# Patient Record
Sex: MALE | Race: BLACK OR AFRICAN AMERICAN | Employment: OTHER | URBAN - NONMETROPOLITAN AREA
[De-identification: names, ages, dates, MRNs, and addresses within clinical notes are randomized per-mention and may not be internally consistent; named-entity substitution may affect disease eponyms.]

---

## 2020-11-23 ENCOUNTER — HOSPITAL ENCOUNTER (EMERGENCY)
Age: 65
Discharge: HOME | End: 2020-11-23
Attending: FAMILY MEDICINE

## 2020-11-23 VITALS
DIASTOLIC BLOOD PRESSURE: 62 MMHG | HEART RATE: 113 BPM | BODY MASS INDEX: 14.54 KG/M2 | RESPIRATION RATE: 16 BRPM | WEIGHT: 90.5 LBS | HEIGHT: 66 IN | SYSTOLIC BLOOD PRESSURE: 122 MMHG | OXYGEN SATURATION: 99 % | TEMPERATURE: 98.6 F

## 2020-11-23 DIAGNOSIS — L89.626 PRESSURE INJURY OF DEEP TISSUE OF LEFT HEEL: ICD-10-CM

## 2020-11-23 DIAGNOSIS — L89.896 PRESSURE INJURY OF DEEP TISSUE OF DORSUM OF LEFT FOOT: Primary | ICD-10-CM

## 2020-11-23 PROCEDURE — 99283 EMERGENCY DEPT VISIT LOW MDM: CPT

## 2020-11-23 RX ORDER — OXYCODONE AND ACETAMINOPHEN 5; 325 MG/1; MG/1
1 TABLET ORAL
Qty: 20 TAB | Refills: 0 | Status: SHIPPED | OUTPATIENT
Start: 2020-11-23 | End: 2020-11-28

## 2020-11-23 RX ORDER — CARVEDILOL 6.25 MG/1
6.25 TABLET ORAL 2 TIMES DAILY WITH MEALS
COMMUNITY
End: 2020-12-04 | Stop reason: SDUPTHER

## 2020-11-23 RX ORDER — LISINOPRIL 5 MG/1
5 TABLET ORAL DAILY
COMMUNITY
End: 2020-12-04 | Stop reason: SDUPTHER

## 2020-11-23 RX ORDER — CLOPIDOGREL BISULFATE 75 MG/1
75 TABLET ORAL
COMMUNITY
End: 2020-12-04 | Stop reason: SDUPTHER

## 2020-11-23 RX ORDER — HYDRALAZINE HYDROCHLORIDE 25 MG/1
25 TABLET, FILM COATED ORAL 3 TIMES DAILY
COMMUNITY
End: 2020-12-04 | Stop reason: SDUPTHER

## 2020-11-23 RX ORDER — PHENOL/SODIUM PHENOLATE
20 AEROSOL, SPRAY (ML) MUCOUS MEMBRANE DAILY
COMMUNITY
End: 2021-02-02

## 2020-11-23 RX ORDER — MIRTAZAPINE 15 MG/1
7.5 TABLET, FILM COATED ORAL
COMMUNITY
End: 2021-01-21

## 2020-11-23 RX ORDER — ATORVASTATIN CALCIUM 40 MG/1
40 TABLET, FILM COATED ORAL DAILY
COMMUNITY
End: 2020-12-04 | Stop reason: SDUPTHER

## 2020-11-23 NOTE — ED TRIAGE NOTES
Pt was discharged from [address] [place] [address] (for rehab after a heart attack) in [place] on Saturday, when pt got home family noticed that he had a DTI/wound on his left foot/heel and left lower leg. Pt was sent home with no pain medicine and his PCP is on vacation.   Pt also has staples in the right stump

## 2020-11-23 NOTE — ED PROVIDER NOTES
EMERGENCY DEPARTMENT HISTORY AND PHYSICAL EXAM      Date: 11/23/2020  Patient Name: Marcy Villa    History of Presenting Illness     Chief Complaint   Patient presents with    Foot Pain    Staple Removal       History Provided By: Patient    HPI: Marcy Villa, 77 y.o. male with past medical history of A-fib, HTN, MI, PVD presents to the ED with complaints of pain to the left foot. Pt lives alone in [place]. He had an MI with triple bypass two months ago. 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 Pt has had very little mobility since being d/c from rehab two days ago. Pt reported having pain in the left lower leg, most prominently in the left foot, to family, who noticed significant skin breakdown in the left shin and dorsum of the left foot. He denies any other sx or acute changes. There are no other complaints, changes, or physical findings at this time. PCP: Wilian Lovett MD    Current Outpatient Medications   Medication Sig Dispense Refill    carvediloL (COREG) 6.25 mg tablet Take 6.25 mg by mouth two (2) times daily (with meals).  hydrALAZINE (APRESOLINE) 25 mg tablet Take 25 mg by mouth three (3) times daily.  lisinopriL (PRINIVIL, ZESTRIL) 5 mg tablet Take 5 mg by mouth daily.  atorvastatin (LIPITOR) 40 mg tablet Take 40 mg by mouth daily.  mirtazapine (REMERON) 15 mg tablet Take 15 mg by mouth nightly.  Omeprazole delayed release (PRILOSEC D/R) 20 mg tablet Take 20 mg by mouth daily.  clopidogreL (Plavix) 75 mg tab Take 75 mg by mouth.          Past History     Past Medical History:  Past Medical History:   Diagnosis Date    Atrial fibrillation ([address]     Hypertension     MI (myocardial infarction) ([address]     [facility]        Past Surgical History:  Past Surgical History:   Procedure Laterality Date    HX ABOVE KNEE AMPUTATION Right     HX HEART CATHETERIZATION Family History:  History reviewed. No pertinent family history. Social History:  Social History     Tobacco Use    Smoking status: Former Smoker    Smokeless tobacco: Never Used   Substance Use Topics    Alcohol use: Not Currently    Drug use: Never       Allergies:  No Known Allergies      Review of Systems   Review of Systems   Constitutional: Negative for chills, fatigue and fever. HENT: Negative for congestion, rhinorrhea and sore throat. Eyes: Negative for pain, redness and visual disturbance. Respiratory: Negative for cough, shortness of breath and wheezing. Cardiovascular: Negative for chest pain and palpitations. Gastrointestinal: Negative for abdominal pain, diarrhea, nausea and vomiting. Musculoskeletal: Negative for arthralgias and myalgias. Positive for pain of the left foot. Skin: Positive for wound (left shin, left foot). Negative for color change and rash. Neurological: Negative for dizziness, weakness, light-headedness and headaches. Psychiatric/Behavioral: Negative for confusion. Physical Exam   Physical Exam  Vitals signs and nursing note reviewed. Constitutional:       General: He is awake. He is not in acute distress. Appearance: Normal appearance. He is well-developed and normal weight. He is not toxic-appearing or diaphoretic. Interventions: Face mask in place. Comments: Thin, elderly. HENT:      Head: Normocephalic and atraumatic. Eyes:      Extraocular Movements: Extraocular movements intact. Pupils: Pupils are equal, round, and reactive to light. Neck:      Musculoskeletal: Normal range of motion and neck supple. Cardiovascular:      Rate and Rhythm: Normal rate and regular rhythm. Pulses:           Posterior tibial pulses are detected w/ Doppler on the left side. Heart sounds: Normal heart sounds. Comments: Faint pulses detected with doppler in the left ankle.   Pulmonary:      Effort: Pulmonary effort is normal.      Breath sounds: Normal breath sounds. Abdominal:      General: Abdomen is flat. Palpations: Abdomen is soft. Tenderness: There is no abdominal tenderness. Feet:      Right foot:      Amputation: Right leg is amputated above knee. Left foot:      Skin integrity: Skin breakdown present. Skin:     General: Skin is warm and dry. Comments: Pressure sore to the left shin, that is approximately 4 x 2 cm(s) in size, with large amount of exudate and pink edges. No evidence of infection. Large pressure sore to the dorsum of the left foot, that is approximately 10 x 4 cm(s) in size, with large amount of exudate and pink edges. Tendons are visible. No evidence of infection. Large painless, necrotic area to the left heel. Neurological:      Mental Status: He is alert and oriented to person, place, and time. GCS: GCS eye subscore is 4. GCS verbal subscore is 5. GCS motor subscore is 6. Psychiatric:         Mood and Affect: Mood and affect normal.         Behavior: Behavior normal. Behavior is cooperative. Thought Content: Thought content normal.         Diagnostic Study Results     Labs -   No results found for this or any previous visit (from the past 12 hour(s)). Radiologic Studies -   No orders to display     CT Results  (Last 48 hours)    None        CXR Results  (Last 48 hours)    None          Medical Decision Making and ED Course   I am the first provider for this patient. I reviewed the vital signs, available nursing notes, past medical history, past surgical history, family history and social history. Vital Signs-Reviewed the patient's vital signs. Patient Vitals for the past 12 hrs:   Temp Pulse Resp BP SpO2   11/23/20 1649 -- -- -- -- 100 %   11/23/20 1616 98.6 °F (37 °C) (!) 110 18 118/63 100 %       Records Reviewed: Nursing Notes and Old Medical Records      ED Course:   Initial assessment performed.  The patients presenting problems have been discussed, and they are in agreement with the care plan formulated and outlined with them. I have encouraged them to ask questions as they arise throughout their visit. Disposition       Discharged    DISCHARGE PLAN:  1. Current Discharge Medication List      CONTINUE these medications which have NOT CHANGED    Details   carvediloL (COREG) 6.25 mg tablet Take 6.25 mg by mouth two (2) times daily (with meals). hydrALAZINE (APRESOLINE) 25 mg tablet Take 25 mg by mouth three (3) times daily. lisinopriL (PRINIVIL, ZESTRIL) 5 mg tablet Take 5 mg by mouth daily. atorvastatin (LIPITOR) 40 mg tablet Take 40 mg by mouth daily. mirtazapine (REMERON) 15 mg tablet Take 15 mg by mouth nightly. Omeprazole delayed release (PRILOSEC D/R) 20 mg tablet Take 20 mg by mouth daily. clopidogreL (Plavix) 75 mg tab Take 75 mg by mouth. 2.   Follow-up Information     Follow up With Specialties Details Why Contact Info    Nancy Valdez DO General Surgery, General Surgery In 1 week For wound re-check [id number] [address] [place]  [phone]          3. Return to ED if worse     Diagnosis     Clinical Impression:   1. Pressure injury of deep tissue of dorsum of left foot    2. Pressure injury of deep tissue of left heel        Attestations:    By signing my name below, I, Fabio Castanon, attest that this documentation has been prepared under the direction and in presence of Dr. Bradley Smart on 11/23/20. Electronically signed: Fbaio Castanon, 11/23/20, 4:57 PM      Please note that this dictation was completed with [employer], the [employer] voice recognition software. Quite often unanticipated grammatical, syntax, homophones, and other interpretive errors are inadvertently transcribed by the computer software. Please disregard these errors. Please excuse any errors that have escaped final proofreading. Thank you.

## 2020-11-30 ENCOUNTER — OFFICE VISIT (OUTPATIENT)
Dept: SURGERY | Age: 65
End: 2020-11-30
Payer: MEDICARE

## 2020-11-30 VITALS — TEMPERATURE: 98.4 F

## 2020-11-30 DIAGNOSIS — L97.521 FOOT ULCER, LEFT, LIMITED TO BREAKDOWN OF SKIN (HCC): Primary | ICD-10-CM

## 2020-11-30 PROCEDURE — 99203 OFFICE O/P NEW LOW 30 MIN: CPT | Performed by: SURGERY

## 2020-11-30 RX ORDER — LANOLIN ALCOHOL/MO/W.PET/CERES
CREAM (GRAM) TOPICAL
COMMUNITY
End: 2020-12-04 | Stop reason: SDUPTHER

## 2020-11-30 NOTE — PROGRESS NOTES
Rolo Mccarty presents today for   Chief Complaint   Patient presents with   [facility] ED Follow-up     Wound on Left Foot       Is someone accompanying this pt? Daughter    Is the patient using any DME equipment during [address]? Wheelchair    Depression Screening:  3 most recent PHQ Screens 11/23/2020   Little interest or pleasure in doing things Not at all   Feeling down, depressed, irritable, or hopeless Not at all   Total Score PHQ 2 0       Learning Assessment:  No flowsheet data found. Abuse Screening:  No flowsheet data found. Fall Risk  Fall Risk Assessment, last 12 mths 11/30/2020   Able to walk? No       Coordination of Care:  1. Have you been to the ER, urgent care clinic since your last visit? Hospitalized since your last visit? ED Follow Up    2. Have you seen or consulted any other health care providers outside of the [address] [place] [address] since your last visit? Include any pap smears or colon screening.  NA

## 2020-11-30 NOTE — LETTER
NOTIFICATION RETURN TO WORK / SCHOOL 
 
11/30/2020 1:12 PM 
 
 
 
 
To Whom It May Concern: 
 
Shahid Chung is daughter to George Xiong who  is currently under the care of [place]. She will return to work/school on: 12/1/2020 If there are questions or concerns please have the patient contact our office. Sincerely, Jt Hobson, DO
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
## 2020-12-04 ENCOUNTER — APPOINTMENT (OUTPATIENT)
Dept: GENERAL RADIOLOGY | Age: 65
DRG: 314 | End: 2020-12-04
Attending: FAMILY MEDICINE
Payer: MEDICARE

## 2020-12-04 ENCOUNTER — APPOINTMENT (OUTPATIENT)
Dept: GENERAL RADIOLOGY | Age: 65
DRG: 314 | End: 2020-12-04
Attending: PHYSICIAN ASSISTANT
Payer: MEDICARE

## 2020-12-04 ENCOUNTER — OFFICE VISIT (OUTPATIENT)
Dept: INTERNAL MEDICINE CLINIC | Age: 65
End: 2020-12-04
Payer: OTHER GOVERNMENT

## 2020-12-04 ENCOUNTER — HOSPITAL ENCOUNTER (INPATIENT)
Age: 65
LOS: 2 days | Discharge: HOME HEALTH | DRG: 314 | End: 2020-12-08
Attending: FAMILY MEDICINE | Admitting: INTERNAL MEDICINE
Payer: MEDICARE

## 2020-12-04 DIAGNOSIS — I95.9 HYPOTENSION, UNSPECIFIED HYPOTENSION TYPE: ICD-10-CM

## 2020-12-04 DIAGNOSIS — J30.89 SEASONAL ALLERGIC RHINITIS DUE TO FUNGAL SPORES: ICD-10-CM

## 2020-12-04 DIAGNOSIS — T07.XXXA WOUNDS, MULTIPLE: ICD-10-CM

## 2020-12-04 DIAGNOSIS — I10 BENIGN ESSENTIAL HTN: ICD-10-CM

## 2020-12-04 DIAGNOSIS — L08.9 WOUND INFECTION: Primary | ICD-10-CM

## 2020-12-04 DIAGNOSIS — Z13.1 DIABETES MELLITUS SCREENING: ICD-10-CM

## 2020-12-04 DIAGNOSIS — E78.2 MIXED HYPERLIPIDEMIA: ICD-10-CM

## 2020-12-04 DIAGNOSIS — D50.9 IRON DEFICIENCY ANEMIA, UNSPECIFIED IRON DEFICIENCY ANEMIA TYPE: ICD-10-CM

## 2020-12-04 DIAGNOSIS — T14.8XXA WOUND INFECTION: Primary | ICD-10-CM

## 2020-12-04 DIAGNOSIS — I25.10 CORONARY ARTERY DISEASE INVOLVING NATIVE HEART WITHOUT ANGINA PECTORIS, UNSPECIFIED VESSEL OR LESION TYPE: Primary | ICD-10-CM

## 2020-12-04 PROBLEM — S91.309A WOUND, OPEN, FOOT: Status: ACTIVE | Noted: 2020-12-04

## 2020-12-04 PROBLEM — A41.9 SEPSIS (HCC): Status: ACTIVE | Noted: 2020-12-04

## 2020-12-04 PROBLEM — Z95.1 HX OF CABG: Status: ACTIVE | Noted: 2020-12-04

## 2020-12-04 PROBLEM — R00.0 TACHYCARDIA: Status: ACTIVE | Noted: 2020-12-04

## 2020-12-04 PROBLEM — E87.20 LACTIC ACID INCREASED: Status: ACTIVE | Noted: 2020-12-04

## 2020-12-04 PROBLEM — F32.9 DEPRESSION, MAJOR: Status: ACTIVE | Noted: 2020-12-04

## 2020-12-04 PROBLEM — I48.20 CHRONIC ATRIAL FIBRILLATION (HCC): Status: ACTIVE | Noted: 2020-12-04

## 2020-12-04 LAB
ALBUMIN SERPL-MCNC: 2.9 G/DL (ref 3.5–4.7)
ALBUMIN/GLOB SERPL: 0.5 {RATIO}
ALP SERPL-CCNC: 99 U/L (ref 38–126)
ALT SERPL-CCNC: 9 U/L (ref 3–72)
ANION GAP SERPL CALC-SCNC: 9 MMOL/L
APPEARANCE UR: CLEAR
AST SERPL W P-5'-P-CCNC: 14 U/L (ref 17–74)
BACTERIA URNS QL MICRO: NEGATIVE /HPF
BASOPHILS # BLD: 0 K/UL (ref 0–0.1)
BASOPHILS NFR BLD: 0 % (ref 0–2)
BILIRUB SERPL-MCNC: 0.5 MG/DL (ref 0.2–1)
BILIRUB UR QL: NEGATIVE
BUN SERPL-MCNC: 19 MG/DL (ref 9–21)
BUN/CREAT SERPL: 19
CA-I BLD-MCNC: 9.7 MG/DL (ref 8.5–10.5)
CHLORIDE SERPL-SCNC: 104 MMOL/L (ref 94–111)
CO2 SERPL-SCNC: 28 MMOL/L (ref 21–33)
COLOR UR: ABNORMAL
CREAT SERPL-MCNC: 1 MG/DL (ref 0.8–1.5)
EOSINOPHIL # BLD: 0.1 K/UL (ref 0–0.4)
EOSINOPHIL NFR BLD: 0 % (ref 0–5)
EPITH CASTS URNS QL MICRO: ABNORMAL /LPF (ref 0–20)
ERYTHROCYTE [DISTWIDTH] IN BLOOD BY AUTOMATED COUNT: 17.2 % (ref 11.6–14.5)
GLOBULIN SER CALC-MCNC: 5.8 G/DL
GLUCOSE SERPL-MCNC: 142 MG/DL (ref 70–110)
GLUCOSE UR STRIP.AUTO-MCNC: NEGATIVE MG/DL
GRAN CASTS URNS QL MICRO: ABNORMAL /LPF
HCT VFR BLD AUTO: 29 % (ref 36–48)
HGB BLD-MCNC: 8.6 G/DL (ref 13–16)
HGB UR QL STRIP: NEGATIVE
HYALINE CASTS URNS QL MICRO: ABNORMAL /LPF
IMM GRANULOCYTES # BLD AUTO: 0.1 K/UL
IMM GRANULOCYTES NFR BLD AUTO: 0 %
KETONES UR QL STRIP.AUTO: NEGATIVE MG/DL
LACTATE SERPL-SCNC: 2.2 MMOL/L (ref 0.5–2)
LACTATE SERPL-SCNC: 2.6 MMOL/L (ref 0.5–2)
LEUKOCYTE ESTERASE UR QL STRIP.AUTO: NEGATIVE
LYMPHOCYTES # BLD: 1.9 K/UL (ref 0.9–3.6)
LYMPHOCYTES NFR BLD: 16 % (ref 21–52)
MCH RBC QN AUTO: 28.4 PG (ref 24–34)
MCHC RBC AUTO-ENTMCNC: 29.7 G/DL (ref 31–37)
MCV RBC AUTO: 95.7 FL (ref 74–97)
MONOCYTES # BLD: 0.8 K/UL (ref 0.05–1.2)
MONOCYTES NFR BLD: 7 % (ref 3–10)
MUCOUS THREADS URNS QL MICRO: ABNORMAL /LPF
NEUTS SEG # BLD: 8.8 K/UL (ref 1.8–8)
NEUTS SEG NFR BLD: 77 % (ref 40–73)
NITRITE UR QL STRIP.AUTO: NEGATIVE
PH UR STRIP: 7 [PH] (ref 5–9)
PLATELET # BLD AUTO: 702 K/UL (ref 135–420)
PMV BLD AUTO: 8.9 FL (ref 9.2–11.8)
POTASSIUM SERPL-SCNC: 3.8 MMOL/L (ref 3.2–5.1)
PROT SERPL-MCNC: 8.7 G/DL (ref 6.1–8.4)
PROT UR STRIP-MCNC: 30 MG/DL
RBC # BLD AUTO: 3.03 M/UL (ref 4.7–5.5)
RBC #/AREA URNS HPF: ABNORMAL /HPF (ref 0–2)
SODIUM SERPL-SCNC: 141 MMOL/L (ref 135–145)
SP GR UR REFRACTOMETRY: 1.01 (ref 1–1.03)
TROPONIN I SERPL-MCNC: 0.02 NG/ML (ref 0.02–0.05)
UROBILINOGEN UR QL STRIP.AUTO: 0.2 EU/DL (ref 0.2–1)
WBC # BLD AUTO: 11.6 K/UL (ref 4.6–13.2)
WBC URNS QL MICRO: ABNORMAL /HPF (ref 0–4)

## 2020-12-04 PROCEDURE — 99218 HC RM OBSERVATION: CPT

## 2020-12-04 PROCEDURE — 74011000258 HC RX REV CODE- 258: Performed by: INTERNAL MEDICINE

## 2020-12-04 PROCEDURE — 96361 HYDRATE IV INFUSION ADD-ON: CPT

## 2020-12-04 PROCEDURE — 80053 COMPREHEN METABOLIC PANEL: CPT

## 2020-12-04 PROCEDURE — 74011250636 HC RX REV CODE- 250/636: Performed by: INTERNAL MEDICINE

## 2020-12-04 PROCEDURE — 99285 EMERGENCY DEPT VISIT HI MDM: CPT

## 2020-12-04 PROCEDURE — 81001 URINALYSIS AUTO W/SCOPE: CPT

## 2020-12-04 PROCEDURE — 99205 OFFICE O/P NEW HI 60 MIN: CPT | Performed by: INTERNAL MEDICINE

## 2020-12-04 PROCEDURE — 74011250636 HC RX REV CODE- 250/636: Performed by: PHYSICIAN ASSISTANT

## 2020-12-04 PROCEDURE — 74011000258 HC RX REV CODE- 258: Performed by: PHYSICIAN ASSISTANT

## 2020-12-04 PROCEDURE — 96365 THER/PROPH/DIAG IV INF INIT: CPT

## 2020-12-04 PROCEDURE — 85025 COMPLETE CBC W/AUTO DIFF WBC: CPT

## 2020-12-04 PROCEDURE — 84484 ASSAY OF TROPONIN QUANT: CPT

## 2020-12-04 PROCEDURE — 87040 BLOOD CULTURE FOR BACTERIA: CPT

## 2020-12-04 PROCEDURE — 83605 ASSAY OF LACTIC ACID: CPT

## 2020-12-04 PROCEDURE — 74011250636 HC RX REV CODE- 250/636: Performed by: FAMILY MEDICINE

## 2020-12-04 PROCEDURE — 71045 X-RAY EXAM CHEST 1 VIEW: CPT

## 2020-12-04 PROCEDURE — 96375 TX/PRO/DX INJ NEW DRUG ADDON: CPT

## 2020-12-04 PROCEDURE — 73620 X-RAY EXAM OF FOOT: CPT

## 2020-12-04 PROCEDURE — 93005 ELECTROCARDIOGRAM TRACING: CPT

## 2020-12-04 RX ORDER — FACIAL-BODY WIPES
10 EACH TOPICAL DAILY PRN
Status: DISCONTINUED | OUTPATIENT
Start: 2020-12-04 | End: 2020-12-08 | Stop reason: HOSPADM

## 2020-12-04 RX ORDER — CLOPIDOGREL BISULFATE 75 MG/1
75 TABLET ORAL DAILY
Status: DISCONTINUED | OUTPATIENT
Start: 2020-12-05 | End: 2020-12-08 | Stop reason: HOSPADM

## 2020-12-04 RX ORDER — LISINOPRIL 5 MG/1
5 TABLET ORAL DAILY
Qty: 90 TAB | Refills: 1 | Status: SHIPPED | OUTPATIENT
Start: 2020-12-04

## 2020-12-04 RX ORDER — CARVEDILOL 6.25 MG/1
6.25 TABLET ORAL 2 TIMES DAILY WITH MEALS
Qty: 180 TAB | Refills: 1 | Status: SHIPPED | OUTPATIENT
Start: 2020-12-04

## 2020-12-04 RX ORDER — ENOXAPARIN SODIUM 100 MG/ML
40 INJECTION SUBCUTANEOUS DAILY
Status: DISCONTINUED | OUTPATIENT
Start: 2020-12-05 | End: 2020-12-07

## 2020-12-04 RX ORDER — SODIUM CHLORIDE 9 MG/ML
75 INJECTION, SOLUTION INTRAVENOUS CONTINUOUS
Status: DISCONTINUED | OUTPATIENT
Start: 2020-12-04 | End: 2020-12-05

## 2020-12-04 RX ORDER — ASPIRIN 81 MG/1
81 TABLET ORAL DAILY
Status: DISCONTINUED | OUTPATIENT
Start: 2020-12-05 | End: 2020-12-08 | Stop reason: HOSPADM

## 2020-12-04 RX ORDER — ASPIRIN 81 MG/1
81 TABLET ORAL DAILY
Qty: 90 TAB | Refills: 1 | Status: SHIPPED | OUTPATIENT
Start: 2020-12-04 | End: 2021-02-02

## 2020-12-04 RX ORDER — HYDRALAZINE HYDROCHLORIDE 25 MG/1
25 TABLET, FILM COATED ORAL 3 TIMES DAILY
Qty: 270 TAB | Refills: 1 | Status: SHIPPED | OUTPATIENT
Start: 2020-12-04

## 2020-12-04 RX ORDER — HYDRALAZINE HYDROCHLORIDE 25 MG/1
25 TABLET, FILM COATED ORAL 3 TIMES DAILY
Status: DISCONTINUED | OUTPATIENT
Start: 2020-12-05 | End: 2020-12-04

## 2020-12-04 RX ORDER — SODIUM CHLORIDE 0.9 % (FLUSH) 0.9 %
5-40 SYRINGE (ML) INJECTION EVERY 8 HOURS
Status: DISCONTINUED | OUTPATIENT
Start: 2020-12-04 | End: 2020-12-06

## 2020-12-04 RX ORDER — LORATADINE 10 MG/1
10 TABLET ORAL
COMMUNITY
End: 2020-12-04 | Stop reason: SDUPTHER

## 2020-12-04 RX ORDER — SENNOSIDES 8.6 MG/1
1 TABLET ORAL DAILY
COMMUNITY

## 2020-12-04 RX ORDER — ASPIRIN 81 MG/1
81 TABLET ORAL DAILY
COMMUNITY
End: 2020-12-04 | Stop reason: SDUPTHER

## 2020-12-04 RX ORDER — CLOPIDOGREL BISULFATE 75 MG/1
75 TABLET ORAL DAILY
Qty: 90 TAB | Refills: 2 | Status: SHIPPED | OUTPATIENT
Start: 2020-12-04 | End: 2021-01-20

## 2020-12-04 RX ORDER — SODIUM CHLORIDE 0.9 % (FLUSH) 0.9 %
5-40 SYRINGE (ML) INJECTION AS NEEDED
Status: DISCONTINUED | OUTPATIENT
Start: 2020-12-04 | End: 2020-12-08 | Stop reason: HOSPADM

## 2020-12-04 RX ORDER — LANOLIN ALCOHOL/MO/W.PET/CERES
325 CREAM (GRAM) TOPICAL
Status: DISCONTINUED | OUTPATIENT
Start: 2020-12-05 | End: 2020-12-08 | Stop reason: HOSPADM

## 2020-12-04 RX ORDER — PROMETHAZINE HYDROCHLORIDE 25 MG/1
12.5 TABLET ORAL
Status: DISCONTINUED | OUTPATIENT
Start: 2020-12-04 | End: 2020-12-08 | Stop reason: HOSPADM

## 2020-12-04 RX ORDER — ATORVASTATIN CALCIUM 40 MG/1
40 TABLET, FILM COATED ORAL DAILY
Status: DISCONTINUED | OUTPATIENT
Start: 2020-12-05 | End: 2020-12-05

## 2020-12-04 RX ORDER — ATORVASTATIN CALCIUM 40 MG/1
40 TABLET, FILM COATED ORAL DAILY
Qty: 90 TAB | Refills: 1 | Status: SHIPPED | OUTPATIENT
Start: 2020-12-04

## 2020-12-04 RX ORDER — ACETAMINOPHEN 325 MG/1
650 TABLET ORAL
Status: DISCONTINUED | OUTPATIENT
Start: 2020-12-04 | End: 2020-12-08 | Stop reason: HOSPADM

## 2020-12-04 RX ORDER — MIRTAZAPINE 15 MG/1
7.5 TABLET, FILM COATED ORAL
Status: DISCONTINUED | OUTPATIENT
Start: 2020-12-05 | End: 2020-12-08 | Stop reason: HOSPADM

## 2020-12-04 RX ORDER — ONDANSETRON 2 MG/ML
4 INJECTION INTRAMUSCULAR; INTRAVENOUS
Status: DISCONTINUED | OUTPATIENT
Start: 2020-12-04 | End: 2020-12-08 | Stop reason: HOSPADM

## 2020-12-04 RX ORDER — LORATADINE 10 MG/1
10 TABLET ORAL DAILY
Status: DISCONTINUED | OUTPATIENT
Start: 2020-12-05 | End: 2020-12-05

## 2020-12-04 RX ORDER — CARVEDILOL 6.25 MG/1
6.25 TABLET ORAL 2 TIMES DAILY WITH MEALS
Status: DISCONTINUED | OUTPATIENT
Start: 2020-12-05 | End: 2020-12-05 | Stop reason: SDUPTHER

## 2020-12-04 RX ORDER — LANOLIN ALCOHOL/MO/W.PET/CERES
325 CREAM (GRAM) TOPICAL
Qty: 90 TAB | Refills: 1 | Status: SHIPPED | OUTPATIENT
Start: 2020-12-04

## 2020-12-04 RX ORDER — LORATADINE 10 MG/1
10 TABLET ORAL DAILY
Qty: 90 TAB | Refills: 3 | Status: SHIPPED | OUTPATIENT
Start: 2020-12-04

## 2020-12-04 RX ORDER — ACETAMINOPHEN 650 MG/1
650 SUPPOSITORY RECTAL
Status: DISCONTINUED | OUTPATIENT
Start: 2020-12-04 | End: 2020-12-08 | Stop reason: HOSPADM

## 2020-12-04 RX ADMIN — SODIUM CHLORIDE 1000 ML: 9 INJECTION, SOLUTION INTRAVENOUS at 19:05

## 2020-12-04 RX ADMIN — SODIUM CHLORIDE 75 ML/HR: 9 INJECTION, SOLUTION INTRAVENOUS at 23:28

## 2020-12-04 RX ADMIN — VANCOMYCIN HYDROCHLORIDE 1000 MG: 1 INJECTION, POWDER, FOR SOLUTION INTRAVENOUS at 21:59

## 2020-12-04 RX ADMIN — PIPERACILLIN AND TAZOBACTAM 3.38 G: 3; .375 INJECTION, POWDER, LYOPHILIZED, FOR SOLUTION INTRAVENOUS at 23:27

## 2020-12-04 RX ADMIN — CEFEPIME 2 G: 2 INJECTION, POWDER, FOR SOLUTION INTRAVENOUS at 21:22

## 2020-12-04 RX ADMIN — SODIUM CHLORIDE 1000 ML: 9 INJECTION, SOLUTION INTRAVENOUS at 17:36

## 2020-12-04 NOTE — ED PROVIDER NOTES
EMERGENCY DEPARTMENT HISTORY AND PHYSICAL EXAM      Date: 12/4/2020  Patient Name: Rasheeda Yang    History of Presenting Illness     Chief Complaint   Patient presents with    Hypotension       History Provided By: Patient's Daughter    HPI: Rasheeda Yang, 77 y.o. male with past medical history significant of A-fib, DM, HTN, HLD, MI, PVD presents to the ED with complaints of hypotension. Pt was noted to have an elevated heart rate (130) and low blood pressure (80/50) when leaving an appointment with his PCP this evening, and was advised to come here for evaluation. Pt reports having indigestion, which his daughter states has resulted in similar vitals before, but denies fever, cough, chest pain or any other sx. Pt was evaluated here several weeks ago for left foot pain secondary to ulcers, which he has seen Dr. Yonathan Velazco (general surgery) for. Pt will need a debridement of left anterior foot wound, which is to be schedule after a vascular study is completed. There are no other complaints, changes, or physical findings at this time. PCP: Nga Bowles MD    No current facility-administered medications on file prior to encounter. Current Outpatient Medications on File Prior to Encounter   Medication Sig Dispense Refill    senna (Senna) 8.6 mg tablet Take 1 Tab by mouth daily.  loratadine (CLARITIN) 10 mg tablet Take 1 Tab by mouth daily. 90 Tab 3    hydrALAZINE (APRESOLINE) 25 mg tablet Take 1 Tab by mouth three (3) times daily. 270 Tab 1    atorvastatin (LIPITOR) 40 mg tablet Take 1 Tab by mouth daily. 90 Tab 1    lisinopriL (PRINIVIL, ZESTRIL) 5 mg tablet Take 1 Tab by mouth daily. 90 Tab 1    carvediloL (COREG) 6.25 mg tablet Take 1 Tab by mouth two (2) times daily (with meals). 180 Tab 1    clopidogreL (Plavix) 75 mg tab Take 1 Tab by mouth daily. 90 Tab 2    aspirin delayed-release 81 mg tablet Take 1 Tab by mouth daily.  90 Tab 1    ferrous sulfate 325 mg (65 mg iron) tablet Take 1 Tab by mouth Daily (before breakfast). 90 Tab 1    mirtazapine (REMERON) 15 mg tablet Take 7.5 mg by mouth nightly.  Omeprazole delayed release (PRILOSEC D/R) 20 mg tablet Take 20 mg by mouth daily. Past History     Past Medical History:  Past Medical History:   Diagnosis Date    [facility]     Hypertension     [facility]     PVD [facility]peripheral vascular disease) [facility]        Past Surgical History:  Past Surgical History:   Procedure Laterality Date    HX ABOVE KNEE AMPUTATION Right     HX ABOVE KNEE AMPUTATION Right     HX HEART CATHETERIZATION         Family History:  Family History   Problem Relation Age of Onset    Cancer Mother         Ovarian, Cervical    Stroke Father     Cancer Brother        Social History:  Social History     Tobacco Use    Smoking status: Former Smoker    Smokeless tobacco: Never Used   Substance Use Topics    Alcohol use: Not Currently    Drug use: Never       Allergies:  No Known Allergies      Review of Systems   Review of Systems   Constitutional: Negative for chills, fatigue and fever. Respiratory: Negative for cough, shortness of breath and wheezing. Cardiovascular: Negative for chest pain and palpitations. Gastrointestinal: Negative for abdominal pain, diarrhea, nausea and vomiting. Musculoskeletal:        Negative for acute changes. Skin: Negative for color change and rash. Negative for acute changes. Neurological: Negative for dizziness, weakness, light-headedness and headaches. Physical Exam   Physical Exam  Vitals signs and nursing note reviewed. Constitutional:       General: He is awake. He is not in acute distress. Appearance: Normal appearance. He is well-developed and normal weight. He is not ill-appearing, toxic-appearing or diaphoretic. Interventions: Face mask in place. Comments: Thin, elderly. HENT:      Head: Normocephalic and atraumatic.    Eyes:      Extraocular Movements: Extraocular movements intact. Pupils: Pupils are equal, round, and reactive to light. Neck:      Musculoskeletal: Normal range of motion and neck supple. Cardiovascular:      Rate and Rhythm: Regular rhythm. Tachycardia present. Heart sounds: Normal heart sounds. Pulmonary:      Effort: Pulmonary effort is normal.      Breath sounds: Normal breath sounds. Abdominal:      General: Abdomen is flat. Palpations: Abdomen is soft. Tenderness: There is no abdominal tenderness. Feet:      Right foot:      Amputation: Right leg is amputated above knee. Left foot:      Skin integrity: Ulcer and skin breakdown present. Skin:     General: Skin is warm and dry. Comments: Small open wound along distal aspect of sternotomy scar. Moderate-sized pressure sore to the left shin, that is well-healing. Large pressure sore to the dorsum of the left foot with small amount of exudate. No evidence of infection. Large painless, necrotic area to the left heel. Neurological:      Mental Status: He is alert and oriented to person, place, and time. GCS: GCS eye subscore is 4. GCS verbal subscore is 5. GCS motor subscore is 6. Psychiatric:         Mood and Affect: Mood and affect normal.         Behavior: Behavior normal. Behavior is cooperative. Thought Content:  Thought content normal.         Diagnostic Study Results     Labs -     Recent Results (from the past 12 hour(s))   CBC WITH AUTOMATED DIFF    Collection Time: 12/04/20  5:30 PM   Result Value Ref Range    WBC 11.6 4.6 - 13.2 K/uL    RBC 3.03 (L) 4.70 - 5.50 M/uL    HGB 8.6 (L) 13.0 - 16.0 g/dL    HCT 29.0 (L) 36.0 - 48.0 %    MCV 95.7 74.0 - 97.0 FL    MCH 28.4 24.0 - 34.0 PG    MCHC 29.7 (L) 31.0 - 37.0 g/dL    RDW 17.2 (H) 11.6 - 14.5 %    PLATELET 909 (H) 274 - 420 K/uL    MPV 8.9 (L) 9.2 - 11.8 FL    NEUTROPHILS 77 (H) 40 - 73 %    LYMPHOCYTES 16 (L) 21 - 52 %    MONOCYTES 7 3 - 10 %    EOSINOPHILS 0 0 - 5 % BASOPHILS 0 0 - 2 %    IMMATURE GRANULOCYTES 0 %    ABS. NEUTROPHILS 8.8 (H) 1.8 - 8.0 K/UL    ABS. LYMPHOCYTES 1.9 0.9 - 3.6 K/UL    ABS. MONOCYTES 0.8 0.05 - 1.2 K/UL    ABS. EOSINOPHILS 0.1 0.0 - 0.4 K/UL    ABS. BASOPHILS 0.0 0.0 - 0.1 K/UL    ABS. IMM. GRANS. 0.1 K/UL   METABOLIC PANEL, COMPREHENSIVE    Collection Time: 12/04/20  5:30 PM   Result Value Ref Range    Sodium 141 135 - 145 mmol/L    Potassium 3.8 3.2 - 5.1 mmol/L    Chloride 104 94 - 111 mmol/L    CO2 28 21 - 33 mmol/L    Anion gap 9 mmol/L    Glucose 142 (H) 70 - 110 mg/dL    BUN 19 9 - 21 mg/dL    Creatinine 1.00 0.8 - 1.50 mg/dL    BUN/Creatinine ratio 19      GFR est AA >60 ml/min/1.73m2    GFR est non-AA >60 ml/min/1.73m2    Calcium 9.7 8.5 - 10.5 mg/dL    Bilirubin, total 0.5 0.2 - 1.0 mg/dL    AST (SGOT) 14 (L) 17 - 74 U/L    ALT (SGPT) 9 3 - 72 U/L    Alk.  phosphatase 99 38 - 126 U/L    Protein, total 8.7 (H) 6.1 - 8.4 g/dL    Albumin 2.9 (L) 3.5 - 4.7 g/dL    Globulin 5.8 g/dL    A-G Ratio 0.5     TROPONIN I    Collection Time: 12/04/20  5:30 PM   Result Value Ref Range    Troponin-I, Qt. 0.02 0.02 - 0.05 ng/mL   LACTIC ACID    Collection Time: 12/04/20  5:30 PM   Result Value Ref Range    Lactic acid 2.6 (HH) 0.5 - 2.0 mmol/L   EKG, 12 LEAD, INITIAL    Collection Time: 12/04/20  5:37 PM   Result Value Ref Range    Ventricular Rate 118 BPM    Atrial Rate 118 BPM    P-R Interval 122 ms    QRS Duration 87 ms    Q-T Interval 337 ms    QTC Calculation (Bezet) 473 ms    Calculated P Axis 84 degrees    Calculated R Axis 85 degrees    Calculated T Axis -89 degrees    Diagnosis       Sinus tachycardia  Probable left atrial enlargement  Borderline right axis deviation  Probable left ventricular hypertrophy  Abnormal T, consider ischemia, diffuse leads     LACTIC ACID    Collection Time: 12/04/20  7:00 PM   Result Value Ref Range    Lactic acid 2.2 (HH) 0.5 - 2.0 mmol/L   URINALYSIS W/ RFLX MICROSCOPIC    Collection Time: 12/04/20  8:27 PM Result Value Ref Range    Color Yellow/Straw      Appearance Clear Clear      Specific gravity 1.015 1.003 - 1.035      pH (UA) 7.0 5.0 - 9.0      Protein 30 (A) Negative mg/dL    Glucose Negative Negative mg/dL    Ketone Negative Negative mg/dL    Bilirubin Negative Negative      Blood Negative Negative      Urobilinogen 0.2 0.2 - 1.0 EU/dL    Nitrites Negative Negative      Leukocyte Esterase Negative Negative     URINE MICROSCOPIC    Collection Time: 12/04/20  8:27 PM   Result Value Ref Range    WBC 0-4 0 - 4 /hpf    RBC 0-5 0 - 2 /hpf    Epithelial cells Few 0 - 20 /lpf    Bacteria Negative (A) None /hpf    Mucus 3+ /lpf    Hyaline cast 2-5 /lpf    Granular cast 2-5 /lpf       Radiologic Studies -   XR CHEST PORT    (Results Pending)   XR FOOT LT AP/LAT    (Results Pending)     CT Results  (Last 48 hours)    None        CXR Results  (Last 48 hours)    None            Medical Decision Making   I am the first provider for this patient. I reviewed the vital signs, available nursing notes, past medical history, past surgical history, family history and social history. Vital Signs-Reviewed the patient's vital signs. Patient Vitals for the past 12 hrs:   Temp Pulse Resp BP SpO2   12/04/20 2257 99.4 °F (37.4 °C) (!) 106 24 (!) 161/86 100 %   12/04/20 2256 -- (!) 104 16 (!) 150/81 99 %   12/04/20 2010 -- -- 16 (!) 145/72 100 %   12/04/20 1830 -- 91 17 137/70 99 %   12/04/20 1745 -- (!) 122 17 (!) 145/80 98 %   12/04/20 1718 98 °F (36.7 °C) (!) 119 16 (!) 90/58 100 %     EKG interpretation (Preliminary): Performed at 5:37 PM; Read at 5:37 PM.  Rhythm: normal sinus rhythm and sinus tachycardia; Rate (approx.) 118; Axis: right axis deviation; MI interval: normal; QRS interval: normal ; ST/T wave: non-specific changes; Other findings: left atrial enlargement, left ventricular hypertrophy; AFib. On plavix. .    Records Reviewed: Nursing Notes and Old Medical Records    ED Course:   Initial assessment performed.  The patients presenting problems have been discussed, and they are in agreement with the care plan formulated and outlined with them. I have encouraged them to ask questions as they arise throughout their visit. Provider Notes (Medical Decision Making):   8:26 PM  Signed out by Dr Missy Castellanos; 76 yo bm sent from Dr Shine Resides office for hypotension and tachycardia. Notes state bp: 83/58; P: 130. Pt is asymptomatic; has feeding tube in place; alert ox 3; right aka; wounds right leg. PMHx: CABGx3 11/'20 [place] with iatrogenic sepsis of RLE requiring right AKA; left shin and left heel PVD ulcers followed by Dr Maria Eugenia Palmer; HLD; [facility] elevated lactate; will admit for observation. Procedures:  7:50 PM. HIMAP ultrasound performed. Good cardiac contractility. No pericardial effusion. No enlargement or plethora of the IVC. No fluid in Morison's pouch. No evidence of an aortic aneurism. No pulmonary findings. Consultations:     Consultations:     Disposition     Disposition: Admit        PLAN:  1. Current Discharge Medication List        2. Follow-up Information    None       Return to ED if worse     Diagnosis     Clinical Impression:   1. Wound infection    2. Hypotension, unspecified hypotension type        By signing my name below, Genna Gonzalez, attsuzette that this documentation has been prepared under the direction and in presence of Dr. Molly Dutton on 12/05/20.  Electronically signed: Eileen Gloria, 12/05/20, 5:23 PM

## 2020-12-04 NOTE — ED TRIAGE NOTES
Pt's caregiver reports they were at the MD office and leaving when they were instructed to go the ED regarding the pt having an elevated HR and low BP. Pt denies feeling bad, states he has gas but nothing else.

## 2020-12-04 NOTE — PROGRESS NOTES
ADDENDUM  -Vital signs were obtained and showed blood pressure 83/58 with a pulse of 130. Oxygen saturation that was 100% and the patient had no symptoms. I am very concerned though given that this man's blood pressure is so low and he is so tachycardic and I have now just ordered all of these antihypertensive meds. He did take most of his meds this morning but he is actually due for doses tonight. Again this is my first time seeing this gentleman and he has a very complicated history which I have no access to because I do not have his medical records. At this point when to refer him to emergency room for emergency labs and evaluation  Total time spent was greater than 60 minutes more than 50% spent in coordination of care and counselling. 25 minutes was spent discussing case with family. 10 minutes was spent in assessing patient and 30 minutes 1. Coronary artery disease involving native heart without angina pectoris, unspecified vessel or lesion type  Will emergently restart Plavix and aspirin which she has run out today. I am in the process of getting his old records from cardiovascular. Unfortunately he is new to the area  - clopidogreL (Plavix) 75 mg tab; Take 1 Tab by mouth daily. Dispense: 90 Tab; Refill: 2  - aspirin delayed-release 81 mg tablet; Take 1 Tab by mouth daily. Dispense: 90 Tab; Refill: 1    2. Mixed hyperlipidemia  Baseline lipid panel unknown continue statin which I have refilled form and will check a lipid profile  - atorvastatin (LIPITOR) 40 mg tablet; Take 1 Tab by mouth daily. Dispense: 90 Tab; Refill: 1  - LIPID PANEL; Future    3. Benign essential HTN  I am calling in emergency orders for his ACE inhibitor his Coreg and his hydralazine for blood pressure control and checking his CMP  - hydrALAZINE (APRESOLINE) 25 mg tablet; Take 1 Tab by mouth three (3) times daily. Dispense: 270 Tab; Refill: 1  - lisinopriL (PRINIVIL, ZESTRIL) 5 mg tablet; Take 1 Tab by mouth daily.   Dispense: 90 Tab; Refill: 1  - carvediloL (COREG) 6.25 mg tablet; Take 1 Tab by mouth two (2) times daily (with meals). Dispense: 180 Tab; Refill: 1  - METABOLIC PANEL, COMPREHENSIVE; Future    4. Seasonal allergic rhinitis due to fungal spores  Continue Claritin  - loratadine (CLARITIN) 10 mg tablet; Take 1 Tab by mouth daily. Dispense: 90 Tab; Refill: 3    5. Iron deficiency anemia, unspecified iron deficiency anemia type  Given the amount of fatigue is been having him definitely renewing his iron sulfate which he has not been taking. I am checking an iron profile and a CBC  - ferrous sulfate 325 mg (65 mg iron) tablet; Take 1 Tab by mouth Daily (before breakfast). Dispense: 90 Tab; Refill: 1  - IRON PROFILE  - CBC WITH AUTOMATED DIFF; Future    6. Diabetes mellitus screening  Given his lower extremity wounds I am checking a hemoglobin A1c to see what his blood sugars have been  - HEMOGLOBIN A1C WITH EAG; Future    7. Wounds, multiple  These are currently being followed by surgery      Chief Complaint   Patient presents with    Establish Care        HPI   This is a very unfortunate 64-year-old gentleman who reportedly have not had a massive heart attack in an outside facility which required an emergency coronary artery bypass. Has acquired a consequence of that bypass he lost his right leg and now has chronic ulcers on his left leg currently being followed by surgery. He also has what appears to be a residual sternotomy wound that is draining a small amount of serous fluid. He reports that he is quite fatigued as well. He denies any current chest pain palpitations or shortness of breath but is unable to walk much because he is missing a leg and is wheelchair-bound. The patient is about to run has run out of all of his medications as of today. I do not have any of his outpatient records. Per the patient's daughter who is at the bedside he has been eating well but she has been having to force some shakes on him. He has not had any nausea or vomiting and he has been moving his bowels. It is difficult to get any further review of systems from this patient because of his underlying mental state. Current Outpatient Medications on File Prior to Visit   Medication Sig Dispense Refill    senna (Senna) 8.6 mg tablet Take 1 Tab by mouth daily.  mirtazapine (REMERON) 15 mg tablet Take 7.5 mg by mouth nightly.  Omeprazole delayed release (PRILOSEC D/R) 20 mg tablet Take 20 mg by mouth daily. No current facility-administered medications on file prior to visit. ROS  - GEN: + weight loss, no fevers or chills  - HEENT: no vision changes, no tinnitus, no sore throat  - CV: no cp, palpitations or edema  - RESP: no sob, cough  - ABD: no n/v/d, no blood in stool  - : no dysuria or changes in freq. - SKIN: chronic LLE wound, sternotomy wound noted  - Neuro: no resting tremors, parasthesia in extremities, no headaches  - MS: + generalized weakness  - Psych: negative for depression or anxiety       Physical Exam  Constitutional:       Appearance:cachectic. under weight. NAD and pleasant hard of hearing  HENT:      Head: Normocephalic. Nose: Nose normal.      Mouth/Throat:      Mouth: Mucous membranes are moist. Throat not inflammed no teeth  Eyes:      Extraocular Movements: Extraocular movements intact. Conjunctiva/sclera: Conjunctivae normal. Sclera anicteric     Pupils: Pupils are equal, round, and reactive to light. Cardiovascular:      Rate and Rhythm: tachycardic 1/6 sm lsb     Pulses: Normal pulses. Pulmonary:      Effort: No respiratory distress. Breath sounds: CTAB and No stridor. No rhonchi. Abdominal:      General: There is no distension. NT, ND  Neurological:      Mental Status: patient is alert and oriented times 3.  No resting tremor, normal gait     Cranial Nerves: cranial nerves grossly intact  Muskuloskeletal     Full ROM in extremities     Normal gait  Skin    Stage 4 open wound LLE. Sternotomy wound noted draining serous drainage.  No warmth or redness       Deferred  Psychiatry     Calm, normal affect, interacting normally

## 2020-12-04 NOTE — PROGRESS NOTES
Establish care. Sees Dr Gilles Prieto for left foot wound. Left foot wound, wants nicotiene patches. Carlos Love presents today for   Chief Complaint   Patient presents with    Establish Care       Depression Screening:  3 most recent PHQ Screens 12/4/2020   Little interest or pleasure in doing things Not at all   Feeling down, depressed, irritable, or hopeless Not at all   Total Score PHQ 2 0       Learning Assessment:  No flowsheet data found. Fall Risk  Fall Risk Assessment, last 12 mths 12/4/2020   Able to walk? Yes   Fall in past 12 months? No       ADL  ADL Assessment 12/4/2020   Feeding yourself No Help Needed   Getting from bed to chair Help Needed   Getting dressed Help Needed   Bathing or showering Help Needed   Walk across the room (includes cane/walker) Help Needed   Using the telphone No Help Needed   Taking your medications No Help Needed   Preparing meals Help Needed   Managing money (expenses/bills) Help Needed   Moderately strenuous housework (laundry) Help Needed   Shopping for personal items (toiletries/medicines) Help Needed   Shopping for groceries Help Needed   Driving Help Needed   Climbing a flight of stairs Help Needed   Getting to places beyond walking distances Help Needed       Health Maintenance reviewed and discussed and ordered per Provider. Health Maintenance Due   Topic Date Due    Hepatitis C Screening  1955    Lipid Screen  08/24/1963    DTaP/Tdap/Td series (1 - Tdap) 08/24/1974    Shingrix Vaccine Age 50> (1 of 2) 08/24/2003    Colorectal Cancer Screening Combo  08/24/2003    GLAUCOMA SCREENING Q2Y  08/24/2018    Pneumococcal 65+ years (1 of 1 - PPSV23) 08/24/2018    Flu Vaccine (1) 09/01/2020    Medicare Yearly Exam  11/23/2020   . Coordination of Care:  1. Have you been to the ER, urgent care clinic since your last visit? Hospitalized since your last visit? yes    2.  Have you seen or consulted any other health care providers outside of the  [facility] [address] since your last visit? Include any pap smears or colon screening.  no

## 2020-12-05 LAB — LACTATE SERPL-SCNC: 0.8 MMOL/L (ref 0.5–2)

## 2020-12-05 PROCEDURE — 36415 COLL VENOUS BLD VENIPUNCTURE: CPT

## 2020-12-05 PROCEDURE — 99218 HC RM OBSERVATION: CPT

## 2020-12-05 PROCEDURE — 74011000258 HC RX REV CODE- 258: Performed by: PHYSICIAN ASSISTANT

## 2020-12-05 PROCEDURE — 74011250637 HC RX REV CODE- 250/637: Performed by: INTERNAL MEDICINE

## 2020-12-05 PROCEDURE — 87205 SMEAR GRAM STAIN: CPT

## 2020-12-05 PROCEDURE — 87186 SC STD MICRODIL/AGAR DIL: CPT

## 2020-12-05 PROCEDURE — 74011000250 HC RX REV CODE- 250: Performed by: PHYSICIAN ASSISTANT

## 2020-12-05 PROCEDURE — 83605 ASSAY OF LACTIC ACID: CPT

## 2020-12-05 PROCEDURE — 87077 CULTURE AEROBIC IDENTIFY: CPT

## 2020-12-05 PROCEDURE — 74011250636 HC RX REV CODE- 250/636: Performed by: PHYSICIAN ASSISTANT

## 2020-12-05 PROCEDURE — 74011250637 HC RX REV CODE- 250/637: Performed by: PHYSICIAN ASSISTANT

## 2020-12-05 PROCEDURE — 96376 TX/PRO/DX INJ SAME DRUG ADON: CPT

## 2020-12-05 RX ORDER — CARVEDILOL 6.25 MG/1
6.25 TABLET ORAL 2 TIMES DAILY WITH MEALS
Status: DISCONTINUED | OUTPATIENT
Start: 2020-12-05 | End: 2020-12-05

## 2020-12-05 RX ORDER — SODIUM CHLORIDE 450 MG/100ML
75 INJECTION, SOLUTION INTRAVENOUS CONTINUOUS
Status: DISCONTINUED | OUTPATIENT
Start: 2020-12-05 | End: 2020-12-08 | Stop reason: HOSPADM

## 2020-12-05 RX ORDER — ATORVASTATIN CALCIUM 40 MG/1
40 TABLET, FILM COATED ORAL
Status: DISCONTINUED | OUTPATIENT
Start: 2020-12-05 | End: 2020-12-08 | Stop reason: HOSPADM

## 2020-12-05 RX ORDER — BISACODYL 5 MG
10 TABLET, DELAYED RELEASE (ENTERIC COATED) ORAL
Status: COMPLETED | OUTPATIENT
Start: 2020-12-05 | End: 2020-12-05

## 2020-12-05 RX ORDER — DOCUSATE SODIUM 100 MG/1
100 CAPSULE, LIQUID FILLED ORAL 2 TIMES DAILY
Status: DISCONTINUED | OUTPATIENT
Start: 2020-12-05 | End: 2020-12-08 | Stop reason: HOSPADM

## 2020-12-05 RX ORDER — OXYCODONE AND ACETAMINOPHEN 5; 325 MG/1; MG/1
1 TABLET ORAL
Status: DISCONTINUED | OUTPATIENT
Start: 2020-12-05 | End: 2020-12-08 | Stop reason: HOSPADM

## 2020-12-05 RX ORDER — CETIRIZINE HCL 10 MG
10 TABLET ORAL DAILY
Status: DISCONTINUED | OUTPATIENT
Start: 2020-12-05 | End: 2020-12-08 | Stop reason: HOSPADM

## 2020-12-05 RX ORDER — METOPROLOL TARTRATE 5 MG/5ML
2.5 INJECTION INTRAVENOUS ONCE
Status: COMPLETED | OUTPATIENT
Start: 2020-12-05 | End: 2020-12-05

## 2020-12-05 RX ORDER — CARVEDILOL 6.25 MG/1
6.25 TABLET ORAL 2 TIMES DAILY WITH MEALS
Status: DISCONTINUED | OUTPATIENT
Start: 2020-12-05 | End: 2020-12-08 | Stop reason: HOSPADM

## 2020-12-05 RX ADMIN — MIRTAZAPINE 7.5 MG: 15 TABLET, FILM COATED ORAL at 00:47

## 2020-12-05 RX ADMIN — SODIUM CHLORIDE 75 ML/HR: 4.5 INJECTION, SOLUTION INTRAVENOUS at 18:01

## 2020-12-05 RX ADMIN — ACETAMINOPHEN 650 MG: 325 TABLET ORAL at 00:46

## 2020-12-05 RX ADMIN — ASPIRIN 81 MG: 81 TABLET, COATED ORAL at 09:23

## 2020-12-05 RX ADMIN — METOPROLOL TARTRATE 2.5 MG: 5 INJECTION INTRAVENOUS at 00:55

## 2020-12-05 RX ADMIN — Medication 10 ML: at 07:59

## 2020-12-05 RX ADMIN — CLOPIDOGREL BISULFATE 75 MG: 75 TABLET ORAL at 09:23

## 2020-12-05 RX ADMIN — CARVEDILOL 6.25 MG: 6.25 TABLET, FILM COATED ORAL at 16:37

## 2020-12-05 RX ADMIN — PIPERACILLIN AND TAZOBACTAM 3.38 G: 3; .375 INJECTION, POWDER, LYOPHILIZED, FOR SOLUTION INTRAVENOUS at 14:25

## 2020-12-05 RX ADMIN — Medication 10 ML: at 22:00

## 2020-12-05 RX ADMIN — BISACODYL 10 MG: 5 TABLET, COATED ORAL at 17:09

## 2020-12-05 RX ADMIN — CARVEDILOL 6.25 MG: 6.25 TABLET, FILM COATED ORAL at 07:59

## 2020-12-05 RX ADMIN — DOCUSATE SODIUM 100 MG: 100 CAPSULE, LIQUID FILLED ORAL at 17:08

## 2020-12-05 RX ADMIN — SODIUM CHLORIDE 75 ML/HR: 4.5 INJECTION, SOLUTION INTRAVENOUS at 01:04

## 2020-12-05 RX ADMIN — PIPERACILLIN AND TAZOBACTAM 3.38 G: 3; .375 INJECTION, POWDER, LYOPHILIZED, FOR SOLUTION INTRAVENOUS at 21:56

## 2020-12-05 RX ADMIN — ATORVASTATIN CALCIUM 40 MG: 40 TABLET, FILM COATED ORAL at 00:46

## 2020-12-05 RX ADMIN — OXYCODONE AND ACETAMINOPHEN 1 TABLET: 5; 325 TABLET ORAL at 04:33

## 2020-12-05 RX ADMIN — MIRTAZAPINE 7.5 MG: 15 TABLET, FILM COATED ORAL at 21:57

## 2020-12-05 RX ADMIN — ENOXAPARIN SODIUM 40 MG: 40 INJECTION SUBCUTANEOUS at 09:24

## 2020-12-05 RX ADMIN — FERROUS SULFATE TAB 325 MG (65 MG ELEMENTAL FE) 325 MG: 325 (65 FE) TAB at 07:59

## 2020-12-05 RX ADMIN — ATORVASTATIN CALCIUM 40 MG: 40 TABLET, FILM COATED ORAL at 21:57

## 2020-12-05 RX ADMIN — CETIRIZINE HYDROCHLORIDE 10 MG: 10 TABLET, FILM COATED ORAL at 09:23

## 2020-12-05 RX ADMIN — PIPERACILLIN AND TAZOBACTAM 3.38 G: 3; .375 INJECTION, POWDER, LYOPHILIZED, FOR SOLUTION INTRAVENOUS at 07:07

## 2020-12-05 RX ADMIN — Medication 10 ML: at 14:09

## 2020-12-05 NOTE — PROGRESS NOTES
Hospitalist Progress Note             Date of Service:  2020  NAME:  Rolo Mccarty  :  1955  MRN:  [id number]    Assessment & Plan:     Hypotension/Lactic acidosis  - both imprved with ivf, suspect dehydrationi more likely than sepsis, but not sure  - cont ivf and iv abx today  - plan on dc in am with po abx and home wound care    HLD  - cont statin    CAD, PVD  - cont bb, plavix, asa        Code status: full  DVT prophylaxis: cont lovenox      Hospital Problems  Date Reviewed: 2020          Codes Class Noted POA    Sepsis ([facility] [address] ICD-10-CM: A41.9  ICD-9-CM: 038.9, 995.91  2020 Unknown    Overview Signed 2020  9:35 PM by Toney Zhou PA-C     Unknown etiology questionable wound infection however wound does not look much infected. We will do blood culture and wound culture. Will place patient on Zosyn for now             Lactic acid increased ICD-10-CM: E87.2  ICD-9-CM: 276.2  2020 Unknown    Overview Signed 2020  9:37 PM by Toney Zhou PA-C     From sepsis. Hydration and continue to monitor lactic acid and follow blood culture             * (Principal) Hypotension ICD-10-CM: I95.9  ICD-9-CM: 458.9  2020 Unknown    Overview Signed 2020  9:38 PM by Toney Zhou PA-C     Likely from sepsis. After IV fluid blood pressure is now up to 035 systolic. Continue with moderate hydration             Wound, open, foot ICD-10-CM: S91.309A  ICD-9-CM: 892.0  2020 Unknown    Overview Addendum 2020 10:01 PM by Toney Zhou PA-C     Left foot wound does not seem to be infected however will do wound culture and continue with dressing change.   Get x-ray of left foot             Hx of CABG ICD-10-CM: Z95.1  ICD-9-CM: V45.81  2020 Unknown    Overview Signed 2020  9:39 PM by Toney Zhou PA-C     Continue with medication and follow cardiology             CAD (coronary artery disease) ICD-10-CM: I25.10  ICD-9-CM: 414.00  12/4/2020 Unknown    Overview Addendum 12/4/2020 10:02 PM by Mann Aragon PA-C     With history of MI so we will continue Plavix and aspirin             Hyperlipidemia, mixed ICD-10-CM: E78.2  ICD-9-CM: 272.2  12/4/2020 Unknown    Overview Signed 12/4/2020  9:40 PM by Mann Aragon PA-C     Continue with statin             Depression, major ICD-10-CM: F32.9  ICD-9-CM: 296.20  12/4/2020 Unknown    Overview Signed 12/4/2020  9:40 PM by Mann Aragon PA-C     Continue medication             HTN (hypertension), benign ICD-10-CM: I10  ICD-9-CM: 401.1  12/4/2020 Unknown    Overview Signed 12/4/2020  9:41 PM by Mann Aragon PA-C     Hold BP meds as BP was low             Tachycardia ICD-10-CM: R00.0  ICD-9-CM: 785.0  12/4/2020 Unknown    Overview Signed 12/4/2020  9:43 PM by Mann Aragon PA-C     Likely from sepsis and dehydration. Will monitor heart rate on telemetry and hydration             Chronic atrial fibrillation [facility]) ICD-10-CM: I48.20  ICD-9-CM: 427.31  12/4/2020 Unknown    Overview Signed 12/4/2020  9:44 PM by Mann Aragon PA-C     Now in sinus rhythm and none carvedilol                     Review of Systems:   A comprehensive review of systems was negative except for that written in the HPI. Vital Signs:    Last 24hrs VS reviewed since prior progress note. Most recent are:  Visit Vitals  BP (!) 147/101   Pulse 98   Temp 97.2 °F (36.2 °C)   Resp 15   Ht 5' 7\" (1.702 m)   Wt 40.8 kg (90 lb)   SpO2 100%   BMI 14.10 kg/m²         Intake/Output Summary (Last 24 hours) at 12/5/2020 1105  Last data filed at 12/5/2020 0443  Gross per 24 hour   Intake 1000 ml   Output 350 ml   Net 650 ml        Physical Examination:             General:          Alert, cooperative, no distress, appears stated age.      HEENT:           Atraumatic, anicteric sclerae, pink conjunctivae No oral ulcers, mucosa moist, throat clear, dentition fair  Neck:               Supple, symmetrical  Lungs:             Clear to auscultation bilaterally. No Wheezing or Rhonchi. No rales. Chest wall:      No tenderness  No Accessory muscle use. Heart:              Regular  rhythm,  No  murmur   No edema  Abdomen:        Soft, non-tender. Not distended. Bowel sounds normal  Extremities:     No cyanosis. No clubbing,                            Skin turgor normal, Capillary refill normal  Skin:                Not pale. Not Jaundiced  No rashes   Psych:             Not anxious or agitated. Neurologic:      Alert, moves all extremities, answers questions appropriately and responds to commands   L leg amputaion  R foot mulitple wounds, and ankle, none grossly infected       Data Review:    Review and/or order of clinical lab test  Review and/or order of tests in the radiology section of CPT  Review and/or order of tests in the medicine section of CPT      Labs:     Recent Labs     12/04/20  1730   WBC 11.6   HGB 8.6*   HCT 29.0*   *     Recent Labs     12/04/20  1730      K 3.8      CO2 28   BUN 19   CREA 1.00   *   CA 9.7     Recent Labs     12/04/20  1730   ALT 9   AP 99   TBILI 0.5   TP 8.7*   ALB 2.9*   GLOB 5.8     No results for input(s): INR, PTP, APTT, INREXT in the last 72 hours. No results for input(s): FE, TIBC, PSAT, FERR in the last 72 hours. No results found for: FOL, RBCF   No results for input(s): PH, PCO2, PO2 in the last 72 hours.   Recent Labs     12/04/20  1730   TROIQ 0.02     No results found for: CHOL, CHOLX, CHLST, CHOLV, HDL, HDLP, LDL, LDLC, DLDLP, TGLX, TRIGL, TRIGP, CHHD, CHHDX  No results found for: María Rounelida  Lab Results   Component Value Date/Time    Color Yellow/Straw 12/04/2020 08:27 PM    Appearance Clear 12/04/2020 08:27 PM    Specific gravity 1.015 12/04/2020 08:27 PM    pH (UA) 7.0 12/04/2020 08:27 PM    Protein 30 (A) 12/04/2020 08:27 PM Glucose Negative 12/04/2020 08:27 PM    Ketone Negative 12/04/2020 08:27 PM    Bilirubin Negative 12/04/2020 08:27 PM    Urobilinogen 0.2 12/04/2020 08:27 PM    Nitrites Negative 12/04/2020 08:27 PM    Leukocyte Esterase Negative 12/04/2020 08:27 PM    Epithelial cells Few 12/04/2020 08:27 PM    Bacteria Negative (A) 12/04/2020 08:27 PM    WBC 0-4 12/04/2020 08:27 PM    RBC 0-5 12/04/2020 08:27 PM         Medications Reviewed:     Current Facility-Administered Medications   Medication Dose Route Frequency    atorvastatin (LIPITOR) tablet 40 mg  40 mg Oral QHS    0.45% sodium chloride infusion  75 mL/hr IntraVENous CONTINUOUS    carvediloL (COREG) tablet 6.25 mg  6.25 mg Oral BID WITH MEALS    oxyCODONE-acetaminophen (PERCOCET) 5-325 mg per tablet 1 Tab  1 Tab Oral Q6H PRN    cetirizine (ZYRTEC) tablet 10 mg  10 mg Oral DAILY    sodium chloride (NS) flush 5-40 mL  5-40 mL IntraVENous Q8H    sodium chloride (NS) flush 5-40 mL  5-40 mL IntraVENous PRN    acetaminophen (TYLENOL) tablet 650 mg  650 mg Oral Q6H PRN    Or    acetaminophen (TYLENOL) suppository 650 mg  650 mg Rectal Q6H PRN    bisacodyL (DULCOLAX) suppository 10 mg  10 mg Rectal DAILY PRN    promethazine (PHENERGAN) tablet 12.5 mg  12.5 mg Oral Q6H PRN    Or    ondansetron (ZOFRAN) injection 4 mg  4 mg IntraVENous Q6H PRN    enoxaparin (LOVENOX) injection 40 mg  40 mg SubCUTAneous DAILY    piperacillin-tazobactam (ZOSYN) 3.375 g in 0.9% sodium chloride (MBP/ADV) 100 mL MBP  3.375 g IntraVENous Q8H    aspirin delayed-release tablet 81 mg  81 mg Oral DAILY    clopidogreL (PLAVIX) tablet 75 mg  75 mg Oral DAILY    ferrous sulfate tablet 325 mg  325 mg Oral ACB    mirtazapine (REMERON) tablet 7.5 mg  7.5 mg Oral QHS     ______________________________________________________________________  EXPECTED LENGTH OF STAY: - - -  ACTUAL LENGTH OF STAY:          0                 Ellard Schirmer, MD

## 2020-12-05 NOTE — PROGRESS NOTES
5cm X 2cm scabbed over wound with no drainage noted, periwound site is not red or warm to the touch.

## 2020-12-05 NOTE — PROGRESS NOTES
Provider notified because patient's Blood pressure was elevated. Provider requested a manual Blood pressure be taken. Which was 166/94 in the left arm. Provider entering orders.

## 2020-12-05 NOTE — PROGRESS NOTES
Provider ordered Lopressor 2.5 mg to be administered to patient for elevated blood pressure. Administered to patient via IV.  Patient tolerated well

## 2020-12-05 NOTE — H&P
History & Physical    Primary Care Provider: Daisy Delgadillo MD  Source of Information: Patient    History of Presenting Illness:   Simon Carreno is a 77 y.o. male with past medical history of chronic A. fib no anticoagulation hypertension CAD status post CABG MI PVD on Plavix aspirin recently was following-up with his  primary care physician today when his blood pressure was 90 systolic and patient was feeling weak . patient was sent to the ER for further evaluation of low blood pressure and weakness . upon arrival in the ER his blood pressure was 90 and his lactic acid was elevated at 2.2 his heart rate was in the 118 .because of his increased lactic acid and hypotension and increased tachycardia so he met criteria for sepsis . she was given IV fluid in the ER and started on Vanco and cefepime and referred to us for admission . he denies any chest pain or shortness of breath no fever . upon evaluation locate the patient left foot wound which does not seem to be infected however we will send a wound culture . blood culture was sent in the ER so we will continue antibiotic at this patient and observe for now . Past Medical History:   Diagnosis Date    Atrial fibrillation ([facility] [address]     Hypertension     MI (myocardial infarction) ([facility] [address]     PVD (peripheral vascular disease) ([facility] [address]       Past Surgical History:   Procedure Laterality Date    HX ABOVE KNEE AMPUTATION Right     HX ABOVE KNEE AMPUTATION Right     HX HEART CATHETERIZATION       Prior to Admission medications    Medication Sig Start Date End Date Taking? Authorizing Provider   senna (Senna) 8.6 mg tablet Take 1 Tab by mouth daily. Provider, Historical   loratadine (CLARITIN) 10 mg tablet Take 1 Tab by mouth daily. 12/4/20   Daisy Delgadillo MD   hydrALAZINE (APRESOLINE) 25 mg tablet Take 1 Tab by mouth three (3) times daily.  12/4/20   Daisy Delgadillo MD   atorvastatin (LIPITOR) 40 mg tablet Take 1 Tab by mouth daily. 12/4/20   Maritza Erickson MD   lisinopriL (PRINIVIL, ZESTRIL) 5 mg tablet Take 1 Tab by mouth daily. 12/4/20   Maritza Rand., MD   carvediloL (COREG) 6.25 mg tablet Take 1 Tab by mouth two (2) times daily (with meals). 12/4/20   Maritza Erickson MD   clopidogreL (Plavix) 75 mg tab Take 1 Tab by mouth daily. 12/4/20   Maritza Rand., MD   aspirin delayed-release 81 mg tablet Take 1 Tab by mouth daily. 12/4/20   Maritza Erickson MD   ferrous sulfate 325 mg (65 mg iron) tablet Take 1 Tab by mouth Daily (before breakfast). 12/4/20   Maritza Erickson MD   mirtazapine (REMERON) 15 mg tablet Take 7.5 mg by mouth nightly. Other, MD Maria Victoria   Omeprazole delayed release (PRILOSEC D/R) 20 mg tablet Take 20 mg by mouth daily. Other, MD Maria Victoria     No Known Allergies   Family History   Problem Relation Age of Onset    Cancer Mother         Ovarian, Cervical    Stroke Father     Cancer Brother         SOCIAL HISTORY:  Patient resides:  He quit smoking long time ago no alcohol no drugs and lives at home with family      Review of Systems:   positive for weakness positive for foot wound but denies chest pain shortness of breath nausea vomiting fever chills. All the other review of systems are normal    Objective:     Physical Exam:     Visit Vitals  BP (!) 145/72 (BP Patient Position: At rest)   Pulse 91   Temp 98 °F (36.7 °C)   Resp 16   Ht 5' 7\" (1.702 m)   Wt 40.8 kg (90 lb)   SpO2 100%   BMI 14.10 kg/m²      O2 Device: Room air    General:  Alert, awake cooperative, no distress, appears stated age. Head:  Normocephalic, without obvious abnormality, atraumatic. Eyes:  Conjunctivae/corneas clear. PERRL, EOMs intact. Nose: Nares normal. Septum midline.  Mucosa normal.   Throat: Lips, mucosa, and tongue normal. Teeth and gums normal.   Neck: Supple, symmetrical, trachea midline, no adenopathy, thyroid: no enlargement/tenderness/nodules, no carotid bruit and no JVD.   Back:   Symmetric, no curvature. ROM normal. No CVA tenderness. Lungs:   Clear to auscultation bilaterally. Chest wall:  No tenderness or deformity. Heart:  Regular rate and rhythm, S1, S2 normal, no murmur, click, rub or gallop. Abdomen:   Soft, non-tender. Bowel sounds normal. No masses,  No organomegaly. Extremities: Extremities normal, atraumatic, no cyanosis or edema. Right above-knee amputation   Pulses: 2+ and symmetric all extremities. Normal capillary refill   Skin:  Open wound dorsum of the foot and another one on the heel area right foot but no redness seem to be healing. no rashes or lesions   Neurologic: CNII-XII intact. No motor or sensory deficits. EKG: Reviewed sinus tachycardia 118      Data Review:     Recent Days:  Recent Labs     12/04/20  1730   WBC 11.6   HGB 8.6*   HCT 29.0*   *     Recent Labs     12/04/20  1730      K 3.8      CO2 28   *   BUN 19   CREA 1.00   CA 9.7   ALB 2.9*   TBILI 0.5   ALT 9     No results for input(s): PH, PCO2, PO2, HCO3, FIO2 in the last 72 hours. 24 Hour Results:  Recent Results (from the past 24 hour(s))   CBC WITH AUTOMATED DIFF    Collection Time: 12/04/20  5:30 PM   Result Value Ref Range    WBC 11.6 4.6 - 13.2 K/uL    RBC 3.03 (L) 4.70 - 5.50 M/uL    HGB 8.6 (L) 13.0 - 16.0 g/dL    HCT 29.0 (L) 36.0 - 48.0 %    MCV 95.7 74.0 - 97.0 FL    MCH 28.4 24.0 - 34.0 PG    MCHC 29.7 (L) 31.0 - 37.0 g/dL    RDW 17.2 (H) 11.6 - 14.5 %    PLATELET 924 (H) 953 - 420 K/uL    MPV 8.9 (L) 9.2 - 11.8 FL    NEUTROPHILS 77 (H) 40 - 73 %    LYMPHOCYTES 16 (L) 21 - 52 %    MONOCYTES 7 3 - 10 %    EOSINOPHILS 0 0 - 5 %    BASOPHILS 0 0 - 2 %    IMMATURE GRANULOCYTES 0 %    ABS. NEUTROPHILS 8.8 (H) 1.8 - 8.0 K/UL    ABS. LYMPHOCYTES 1.9 0.9 - 3.6 K/UL    ABS. MONOCYTES 0.8 0.05 - 1.2 K/UL    ABS. EOSINOPHILS 0.1 0.0 - 0.4 K/UL    ABS. BASOPHILS 0.0 0.0 - 0.1 K/UL    ABS. IMM.  GRANS. 0.1 K/UL   METABOLIC PANEL, COMPREHENSIVE Collection Time: 12/04/20  5:30 PM   Result Value Ref Range    Sodium 141 135 - 145 mmol/L    Potassium 3.8 3.2 - 5.1 mmol/L    Chloride 104 94 - 111 mmol/L    CO2 28 21 - 33 mmol/L    Anion gap 9 mmol/L    Glucose 142 (H) 70 - 110 mg/dL    BUN 19 9 - 21 mg/dL    Creatinine 1.00 0.8 - 1.50 mg/dL    BUN/Creatinine ratio 19      GFR est AA >60 ml/min/1.73m2    GFR est non-AA >60 ml/min/1.73m2    Calcium 9.7 8.5 - 10.5 mg/dL    Bilirubin, total 0.5 0.2 - 1.0 mg/dL    AST (SGOT) 14 (L) 17 - 74 U/L    ALT (SGPT) 9 3 - 72 U/L    Alk.  phosphatase 99 38 - 126 U/L    Protein, total 8.7 (H) 6.1 - 8.4 g/dL    Albumin 2.9 (L) 3.5 - 4.7 g/dL    Globulin 5.8 g/dL    A-G Ratio 0.5     TROPONIN I    Collection Time: 12/04/20  5:30 PM   Result Value Ref Range    Troponin-I, Qt. 0.02 0.02 - 0.05 ng/mL   LACTIC ACID    Collection Time: 12/04/20  5:30 PM   Result Value Ref Range    Lactic acid 2.6 (HH) 0.5 - 2.0 mmol/L   EKG, 12 LEAD, INITIAL    Collection Time: 12/04/20  5:37 PM   Result Value Ref Range    Ventricular Rate 118 BPM    Atrial Rate 118 BPM    P-R Interval 122 ms    QRS Duration 87 ms    Q-T Interval 337 ms    QTC Calculation (Bezet) 473 ms    Calculated P Axis 84 degrees    Calculated R Axis 85 degrees    Calculated T Axis -89 degrees    Diagnosis       Sinus tachycardia  Probable left atrial enlargement  Borderline right axis deviation  Probable left ventricular hypertrophy  Abnormal T, consider ischemia, diffuse leads     LACTIC ACID    Collection Time: 12/04/20  7:00 PM   Result Value Ref Range    Lactic acid 2.2 (HH) 0.5 - 2.0 mmol/L   URINALYSIS W/ RFLX MICROSCOPIC    Collection Time: 12/04/20  8:27 PM   Result Value Ref Range    Color Yellow/Straw      Appearance Clear Clear      Specific gravity 1.015 1.003 - 1.035      pH (UA) 7.0 5.0 - 9.0      Protein 30 (A) Negative mg/dL    Glucose Negative Negative mg/dL    Ketone Negative Negative mg/dL    Bilirubin Negative Negative      Blood Negative Negative Urobilinogen 0.2 0.2 - 1.0 EU/dL    Nitrites Negative Negative      Leukocyte Esterase Negative Negative     URINE MICROSCOPIC    Collection Time: 12/04/20  8:27 PM   Result Value Ref Range    WBC 0-4 0 - 4 /hpf    RBC 0-5 0 - 2 /hpf    Epithelial cells Few 0 - 20 /lpf    Bacteria Negative (A) None /hpf    Mucus 3+ /lpf    Hyaline cast 2-5 /lpf    Granular cast 2-5 /lpf         Imaging: CXR imaging review negative for infiltrate and official result to follow    Assessment  and Plan                 Principal Problem:    Hypotension (12/4/2020)      Overview: Likely from sepsis. After IV fluid blood pressure is now up to 953       systolic. Continue with moderate hydration    Active Problems:    Lactic acid increased (12/4/2020)      Overview: From sepsis. Hydration and continue to monitor lactic acid and follow       blood culture      Sepsis ([facility] [address] (12/4/2020)      Overview: Unknown etiology questionable wound infection however wound does not look       much infected. We will do blood culture and wound culture. Will place       patient on Zosyn for now      Tachycardia (12/4/2020)      Overview: Likely from sepsis and dehydration. Will monitor heart rate on telemetry       and hydration      Wound, open, foot (12/4/2020)      Overview: Left foot wound does not seem to be infected however will do wound culture       and continue with dressing change. Get x-ray of left foot      Hx of CABG (12/4/2020)      Overview: Continue with medication and follow cardiology      CAD (coronary artery disease) (12/4/2020)      Overview:  With history of MI so we will continue Plavix and aspirin      Hyperlipidemia, mixed (12/4/2020)      Overview: Continue with statin      Depression, major (12/4/2020)      Overview: Continue medication      HTN (hypertension), benign (12/4/2020)      Overview: Hold BP meds as BP was low      Chronic atrial fibrillation ([facility] [address] (12/4/2020)      Overview: Now in sinus rhythm and none carvedilol    Lovenox subcu for DVT prophylaxis. Clinically stable at present. Follow wound culture and blood culture and lactic acid. Discussed with ER physician. Medication review and reorder.   Time spent on this admission is about over 45 minutes             Signed By: Colton Sofia PA-C     December 4, 2020

## 2020-12-05 NOTE — PROGRESS NOTES
Received patient on floor via stretcher. Accompanied by daughter who has permission to stay by provider. Admission assessment completed.

## 2020-12-05 NOTE — ED NOTES
Bedside and Verbal shift change report given to John Sandra RN by Saige Nassar RN. Report included the following information SBAR, ED Summary, MAR and Recent Results.

## 2020-12-05 NOTE — PROGRESS NOTES
Rechecked patient's Bp and it has came gown to 146/84.  Pstient left resting with call bell in reach and bed in lowerest position

## 2020-12-05 NOTE — PROGRESS NOTES
Started patients Zosyn. Patient tolerated well. Left patient resting in bed. Light dim. Bed in lowest position. Call bell in reach.

## 2020-12-05 NOTE — ROUTINE PROCESS
TRANSFER - OUT REPORT:    Verbal report given to Radha (name) on Darell Jarvis  being transferred to [address] [place] [id number](unit) for routine progression of care       Report consisted of patients Situation, Background, Assessment and   Recommendations(SBAR). Information from the following report(s) SBAR, ED Summary and MAR was reviewed with the receiving nurse. Lines:   Peripheral IV 12/04/20 Right Antecubital (Active)   Site Assessment Clean, dry, & intact 12/04/20 1731   Phlebitis Assessment 0 12/04/20 1731   Infiltration Assessment 0 12/04/20 1731   Dressing Status Clean, dry, & intact 12/04/20 1731   Dressing Type Transparent 12/04/20 1731   Hub Color/Line Status Pink 12/04/20 1731   Alcohol Cap Used No 12/04/20 1731        Opportunity for questions and clarification was provided.       Patient transported with:   Monitor  Registered Nurse

## 2020-12-05 NOTE — PROGRESS NOTES
Patient administered pain medication for pain in left foot ulcer. Change brief. Patient left resting with bed in lowest position.  Call bell in reach

## 2020-12-05 NOTE — PROGRESS NOTES
Provider notified about patient blood pressure being elevated and provider requested manual blood pressure be taken which was 166/94 and a HR of 106. Provider entering additional orders.

## 2020-12-05 NOTE — PROGRESS NOTES
Bedside and Verbal shift change report given to Niesha Sommers (oncoming nurse) by Pacheco Brown Rn (offgoing nurse). Report included the following information SBAR, Kardex, Intake/Output and Dual Neuro Assessment.

## 2020-12-06 PROBLEM — I50.9 CHF (CONGESTIVE HEART FAILURE) (HCC): Status: ACTIVE | Noted: 2020-12-06

## 2020-12-06 PROBLEM — J44.9 COPD (CHRONIC OBSTRUCTIVE PULMONARY DISEASE) (HCC): Status: ACTIVE | Noted: 2020-12-06

## 2020-12-06 PROBLEM — N18.9 CKD (CHRONIC KIDNEY DISEASE): Status: ACTIVE | Noted: 2020-12-06

## 2020-12-06 LAB
ALBUMIN SERPL-MCNC: 2.3 G/DL (ref 3.5–4.7)
ALBUMIN/GLOB SERPL: 0.5 {RATIO}
ALP SERPL-CCNC: 76 U/L (ref 38–126)
ALT SERPL-CCNC: 9 U/L (ref 3–72)
ANION GAP SERPL CALC-SCNC: 6 MMOL/L
AST SERPL W P-5'-P-CCNC: 14 U/L (ref 17–74)
BASOPHILS # BLD: 0 K/UL (ref 0–0.1)
BASOPHILS NFR BLD: 0 % (ref 0–2)
BILIRUB SERPL-MCNC: 0.4 MG/DL (ref 0.2–1)
BUN SERPL-MCNC: 10 MG/DL (ref 9–21)
BUN/CREAT SERPL: 20
CA-I BLD-MCNC: 8.4 MG/DL (ref 8.5–10.5)
CHLORIDE SERPL-SCNC: 106 MMOL/L (ref 94–111)
CO2 SERPL-SCNC: 25 MMOL/L (ref 21–33)
CREAT SERPL-MCNC: 0.5 MG/DL (ref 0.8–1.5)
EOSINOPHIL # BLD: 0.1 K/UL (ref 0–0.4)
EOSINOPHIL NFR BLD: 1 % (ref 0–5)
ERYTHROCYTE [DISTWIDTH] IN BLOOD BY AUTOMATED COUNT: 16.8 % (ref 11.6–14.5)
GLOBULIN SER CALC-MCNC: 4.4 G/DL
GLUCOSE SERPL-MCNC: 114 MG/DL (ref 70–110)
HCT VFR BLD AUTO: 21.8 % (ref 36–48)
HGB BLD-MCNC: 6.2 G/DL (ref 13–16)
IMM GRANULOCYTES # BLD AUTO: 0 K/UL
IMM GRANULOCYTES NFR BLD AUTO: 0 %
LYMPHOCYTES # BLD: 2.6 K/UL (ref 0.9–3.6)
LYMPHOCYTES NFR BLD: 22 % (ref 21–52)
MCH RBC QN AUTO: 27.1 PG (ref 24–34)
MCHC RBC AUTO-ENTMCNC: 28.4 G/DL (ref 31–37)
MCV RBC AUTO: 95.2 FL (ref 74–97)
MONOCYTES # BLD: 1.1 K/UL (ref 0.05–1.2)
MONOCYTES NFR BLD: 9 % (ref 3–10)
NEUTS SEG # BLD: 8 K/UL (ref 1.8–8)
NEUTS SEG NFR BLD: 68 % (ref 40–73)
PLATELET # BLD AUTO: 527 K/UL (ref 135–420)
PMV BLD AUTO: 8.8 FL (ref 9.2–11.8)
POTASSIUM SERPL-SCNC: 3.6 MMOL/L (ref 3.2–5.1)
PROT SERPL-MCNC: 6.7 G/DL (ref 6.1–8.4)
RBC # BLD AUTO: 2.29 M/UL (ref 4.7–5.5)
RBC MORPH BLD: ABNORMAL
RBC MORPH BLD: ABNORMAL
SODIUM SERPL-SCNC: 137 MMOL/L (ref 135–145)
WBC # BLD AUTO: 11.8 K/UL (ref 4.6–13.2)

## 2020-12-06 PROCEDURE — 74011250636 HC RX REV CODE- 250/636: Performed by: PHYSICIAN ASSISTANT

## 2020-12-06 PROCEDURE — 99218 HC RM OBSERVATION: CPT

## 2020-12-06 PROCEDURE — 85025 COMPLETE CBC W/AUTO DIFF WBC: CPT

## 2020-12-06 PROCEDURE — 74011000258 HC RX REV CODE- 258: Performed by: PHYSICIAN ASSISTANT

## 2020-12-06 PROCEDURE — 80053 COMPREHEN METABOLIC PANEL: CPT

## 2020-12-06 PROCEDURE — 36415 COLL VENOUS BLD VENIPUNCTURE: CPT

## 2020-12-06 PROCEDURE — 74011250637 HC RX REV CODE- 250/637: Performed by: PHYSICIAN ASSISTANT

## 2020-12-06 PROCEDURE — 74011250637 HC RX REV CODE- 250/637: Performed by: INTERNAL MEDICINE

## 2020-12-06 PROCEDURE — 96376 TX/PRO/DX INJ SAME DRUG ADON: CPT

## 2020-12-06 PROCEDURE — 65270000029 HC RM PRIVATE

## 2020-12-06 RX ORDER — SODIUM CHLORIDE 0.9 % (FLUSH) 0.9 %
5-40 SYRINGE (ML) INJECTION AS NEEDED
Status: DISCONTINUED | OUTPATIENT
Start: 2020-12-06 | End: 2020-12-08 | Stop reason: HOSPADM

## 2020-12-06 RX ADMIN — PIPERACILLIN AND TAZOBACTAM 3.38 G: 3; .375 INJECTION, POWDER, LYOPHILIZED, FOR SOLUTION INTRAVENOUS at 14:09

## 2020-12-06 RX ADMIN — CARVEDILOL 6.25 MG: 6.25 TABLET, FILM COATED ORAL at 16:53

## 2020-12-06 RX ADMIN — CLOPIDOGREL BISULFATE 75 MG: 75 TABLET ORAL at 08:34

## 2020-12-06 RX ADMIN — PIPERACILLIN AND TAZOBACTAM 3.38 G: 3; .375 INJECTION, POWDER, LYOPHILIZED, FOR SOLUTION INTRAVENOUS at 23:07

## 2020-12-06 RX ADMIN — ASPIRIN 81 MG: 81 TABLET, COATED ORAL at 08:34

## 2020-12-06 RX ADMIN — ENOXAPARIN SODIUM 40 MG: 40 INJECTION SUBCUTANEOUS at 08:34

## 2020-12-06 RX ADMIN — MIRTAZAPINE 7.5 MG: 15 TABLET, FILM COATED ORAL at 23:07

## 2020-12-06 RX ADMIN — CARVEDILOL 6.25 MG: 6.25 TABLET, FILM COATED ORAL at 08:34

## 2020-12-06 RX ADMIN — CETIRIZINE HYDROCHLORIDE 10 MG: 10 TABLET, FILM COATED ORAL at 08:34

## 2020-12-06 RX ADMIN — FERROUS SULFATE TAB 325 MG (65 MG ELEMENTAL FE) 325 MG: 325 (65 FE) TAB at 08:48

## 2020-12-06 RX ADMIN — ATORVASTATIN CALCIUM 40 MG: 40 TABLET, FILM COATED ORAL at 23:07

## 2020-12-06 RX ADMIN — PIPERACILLIN AND TAZOBACTAM 3.38 G: 3; .375 INJECTION, POWDER, LYOPHILIZED, FOR SOLUTION INTRAVENOUS at 05:16

## 2020-12-06 NOTE — PROGRESS NOTES
Progress Note  Date:12/6/2020       Room:[facility]  Patient Name:Miguelito Palomino     YOB: 1955     Age:65 y.o. Subjective    Yaneth Gutiérrez is a 77 y.o. male with past medical history of chronic A. fib no anticoagulation hypertension CAD status post CABG MI PVD on Plavix aspirin recently was following-up with his  primary care physician today when his blood pressure was 90 systolic and patient was feeling weak . He continues to deny chest pain or shortness of breath. Objective           Vitals Last 24 Hours:  Patient Vitals for the past 24 hrs:   Temp Pulse Resp BP SpO2   12/06/20 0804 98.7 °F (37.1 °C) (!) 109 20 (!) 157/92 --   12/06/20 0000 99.1 °F (37.3 °C) (!) 105 20 (!) 163/91 100 %   12/05/20 1955 97.6 °F (36.4 °C) 99 18 (!) 141/81 100 %   12/05/20 1633 97.7 °F (36.5 °C) 96 16 137/80 99 %   12/05/20 1600 -- 96 -- -- --        I/O (24Hr): Intake/Output Summary (Last 24 hours) at 12/6/2020 1302  Last data filed at 12/6/2020 0522  Gross per 24 hour   Intake 2000 ml   Output 1500 ml   Net 500 ml       Physical Exam:  General Appearance:  Comfortable, well-appearing. No acute distress. HEENT: NCAT, PERRL, No deformities or discharge, Neck supple with trachea midline. Respiratory: Normal respiratory rate. Breath sounds clear to auscultation. Heart: S1 normal and S2 normal.  No tachycardia  Abdomen: Soft and flat. Bowel sounds are normal. No rebound or guarding. No organomegaly. Extremities: Normal range of motion. No acute deformities. No peripheral edema. Pulses: Distal pulses are intact. Neurological: Alert and oriented to person, place and time. Grossly intact. Skin:  Warm and dry. No acute lesions.     Medications           Current Facility-Administered Medications   Medication Dose Route Frequency    sodium chloride (NS) flush 5-40 mL  5-40 mL IntraVENous PRN    atorvastatin (LIPITOR) tablet 40 mg  40 mg Oral QHS    0.45% sodium chloride infusion  75 mL/hr IntraVENous CONTINUOUS    carvediloL (COREG) tablet 6.25 mg  6.25 mg Oral BID WITH MEALS    oxyCODONE-acetaminophen (PERCOCET) 5-325 mg per tablet 1 Tab  1 Tab Oral Q6H PRN    cetirizine (ZYRTEC) tablet 10 mg  10 mg Oral DAILY    docusate sodium (COLACE) capsule 100 mg  100 mg Oral BID    sodium chloride (NS) flush 5-40 mL  5-40 mL IntraVENous PRN    acetaminophen (TYLENOL) tablet 650 mg  650 mg Oral Q6H PRN    Or    acetaminophen (TYLENOL) suppository 650 mg  650 mg Rectal Q6H PRN    bisacodyL (DULCOLAX) suppository 10 mg  10 mg Rectal DAILY PRN    promethazine (PHENERGAN) tablet 12.5 mg  12.5 mg Oral Q6H PRN    Or    ondansetron (ZOFRAN) injection 4 mg  4 mg IntraVENous Q6H PRN    enoxaparin (LOVENOX) injection 40 mg  40 mg SubCUTAneous DAILY    piperacillin-tazobactam (ZOSYN) 3.375 g in 0.9% sodium chloride (MBP/ADV) 100 mL MBP  3.375 g IntraVENous Q8H    aspirin delayed-release tablet 81 mg  81 mg Oral DAILY    clopidogreL (PLAVIX) tablet 75 mg  75 mg Oral DAILY    ferrous sulfate tablet 325 mg  325 mg Oral ACB    mirtazapine (REMERON) tablet 7.5 mg  7.5 mg Oral QHS         Allergies         Patient has no known allergies. Labs/Imaging/Diagnostics      Labs:  No results found for this or any previous visit (from the past 24 hour(s)). Trended key labs include:  Recent Labs     12/04/20  1730   WBC 11.6   HGB 8.6*   HCT 29.0*   *     Recent Labs     12/04/20  1730      K 3.8      CO2 28   *   BUN 19   CREA 1.00   CA 9.7   ALB 2.9*   TBILI 0.5   ALT 9       Imaging Last 24 Hours:  No results found.     Assessment//Plan           Patient Active Problem List    Diagnosis Date Noted    Sepsis ([facility] [address] 12/04/2020    Lactic acid increased 12/04/2020    Hypotension 12/04/2020    Wound, open, foot 12/04/2020    Hx of CABG 12/04/2020    CAD (coronary artery disease) 12/04/2020    Hyperlipidemia, mixed 12/04/2020    Depression, major 12/04/2020    HTN (hypertension), benign 12/04/2020    Tachycardia 12/04/2020    Chronic atrial fibrillation ([facility] 12/04/2020        Hypotension  -Blood pressures have improved however patient does remain somewhat tachycardic  -Not shortness of breath  -Generalized weakness  -CBC not available for rounds in the morning will order now    Sepsis [facility])  -Patient continues on Zosyn  -Blood cultures show no growth at 24 hours  -Gram stain shows rare WBCs otherwise no organisms found final results pending  -No leukocytosis or fever    Wound, open, foot  -Continues on Zosyn  -Gram stain rare WBCs otherwise no microorganisms found  -Final result pending    CAD (coronary artery disease)  -Continue on home medication  -Stable    Hyperlipidemia, mixed  -Continue on home medication  -Stable    Depression, major  -Continue on home medication  -Stable    HTN (hypertension), benign  -Remains mildly elevated  -We will continue with home medications  -We will continue to monitor with medication adjustment as clinically indicated    Chronic atrial fibrillation (HCC)  -Mild tachycardia  -A. fib rate controlled  -On Plavix, aspirin, carvedilol  -Continue on telemetry    Anemia  -CBC shows low hemoglobin and hematocrit at 6.1 and 21.2, respectively  -We will obtain an iron panel  -We will initiate possible transfusion as clinically indicated  -We will type and screen now          Code status: Full   Prophylaxis: SCD and Lovenox    Clinical time 50 minutes with >50% of visit spent in counseling and coordination of care      Electronically signed by Gurpreet Arora, PhD, PA-C on 12/6/2020 at 1:02 PM

## 2020-12-06 NOTE — PROGRESS NOTES
Spoke with daughter on phone at this time. Daughter stating that if pt is discharged tomorrow she will be unable to pick him up until after 4:30pm but that she can be reached at anytime by cell phone.

## 2020-12-06 NOTE — PROGRESS NOTES
Talked with pt at bedside. Pt stated that he lived with \" a friend that he thinks he is kin too. \"  He is a  but does not receive services at a [facility]. He states that he has a wheelchair and sits in it most of the day as he \" can't walk. \"  Pt changed to adm status from OBS today.

## 2020-12-06 NOTE — PROGRESS NOTES
Bedside and Verbal shift change report given to Juanita Pearce Rn (oncoming nurse) by Ro Marte (offgoing nurse). Report included the following information SBAR, Kardex, Intake/Output, Recent Results and Dual Neuro Assessment.

## 2020-12-06 NOTE — PROGRESS NOTES
Comprehensive Nutrition Assessment    Type and Reason for Visit: Initial    Nutrition Recommendations/Plan: Diabetic/Cardiac diet 2G Na restriction start Glucerna BID    Nutrition Assessment:  76 yo male PMH: A-fib, DM, HTN, HLD, MI, PVD   Pt with left foot ulcer requiring debridement and will have done after vascular study. Pt is very underweight both by BMI of 14.1 and visibly. Pt reports loss of 15 lbs while in NH because he did not like the food and refused to eat. Pt also with albumin of 2.9. Pt currently eating 50% of meals and agrees to take glucerna BID     No results found for this or any previous visit (from the past 24 hour(s)). Malnutrition Assessment:  Malnutrition Status:  Severe malnutrition(BMI 14.1, very thin albumin 2.9 pt with left foot ulcer as well reports did not like food at NH so did not eat resulting in 15 lbs wt loss)    Context:  Acute illness     Findings of the 6 clinical characteristics of malnutrition:   Energy Intake:  7 - 50% or less of est energy requirements for 5 or more days  Weight Loss:  7.0 - Greater than 5% over 1 month     Body Fat Loss:  7 - Moderate body fat loss,     Muscle Mass Loss:  7 - Moderate muscle mass loss, Clavicles (pectoralis & deltoids), Temples (temporalis)  Fluid Accumulation:  No significant fluid accumulation,     Strength:  Not performed         Estimated Daily Nutrient Needs:  Energy (kcal): 8599-5892 kcal/day; Weight Used for Energy Requirements: Admission(41 kg)  Protein (g): 41-50 g/day; Weight Used for Protein Requirements: Admission(1-1.2 g/kg)  Fluid (ml/day): 0920-4919 mL/day; Method Used for Fluid Requirements: 1 ml/kcal      Nutrition Related Findings:  Pt is underweight with BMI of 14.1 pt is eating 50% of meals, currenlty with left foot ulcer requiring debridment per surgery and will be scheduled after vascular study.  albumin 2.9      Wounds:    Diabetic ulcer(Left anterior foot requires debridment)       Current Nutrition Therapies:  DIET CARDIAC Regular    Anthropometric Measures:  · Height:  5' 7\" (170.2 cm)  · Current Body Wt:  40.8 kg (90 lb)   · Admission Body Wt:  90 lb    · Usual Body Wt:        · Ideal Body Wt:  148 lbs:  60.8 %   · Adjusted Body Weight:   ; Weight Adjustment for: No adjustment   · Adjusted BMI:       · BMI Category:  Underweight (BMI less than 18.5)       Nutrition Diagnosis:   · Inadequate oral intake, Increased nutrient needs, Underweight, Altered nutrition-related lab values related to acute injury/trauma, inadequate protein-energy intake, increased demand for energy/nutrients as evidenced by intake 26-50%, wounds, BMI, weight loss greater than or equal to 10% in 6 months      Nutrition Interventions:   Food and/or Nutrient Delivery: Modify current diet, Start oral nutrition supplement  Nutrition Education and Counseling: Education initiated  Coordination of Nutrition Care: Continue to monitor while inpatient    Goals:  Pt to eat > 75% of meals and supplements, BMI goal with wt gain 18.5-24.9, BM q 1-3 days, albumin > 3.5, promote wound healing       Nutrition Monitoring and Evaluation:   Behavioral-Environmental Outcomes: Readiness for change  Food/Nutrient Intake Outcomes: Food and nutrient intake, Supplement intake  Physical Signs/Symptoms Outcomes: Biochemical data, Meal time behavior, Nutrition focused physical findings, Skin, Weight     F/U: 12/9/2020    Discharge Planning:    Continue oral nutrition supplement, Continue current diet     Electronically signed by Paulette Naylor on 12/6/2020 at 10:41 AM    Contact: RENEE [phone]

## 2020-12-06 NOTE — PROGRESS NOTES
Administered po meds. Patient tolerated them well. Patient express no other needs. Left patient resting in bed with bed in lowest position and call bell in reach.

## 2020-12-06 NOTE — PROGRESS NOTES
Received care of pt. Pt resting in bed with eyes closed. Vitals stable. Pt changed of incontinent stool. Bed in lowest position, wheels locked, call bell within reach.

## 2020-12-06 NOTE — PROGRESS NOTES
Patient assessment completed. Patient resting in bed watching tv. Bed in lowest position and call bell in reach.

## 2020-12-06 NOTE — PROGRESS NOTES
Problem: Falls - Risk of  Goal: *Absence of Falls  Description: Document Barbie Vásquez Fall Risk and appropriate interventions in the flowsheet. Outcome: Progressing Towards Goal  Note: Fall Risk Interventions:  Mobility Interventions: Utilize walker, cane, or other assistive device, PT Consult for mobility concerns    Mentation Interventions: Reorient patient, More frequent rounding    Medication Interventions: Evaluate medications/consider consulting pharmacy    Elimination Interventions: Call light in reach, Patient to call for help with toileting needs, Toileting schedule/hourly rounds, Urinal in reach              Problem: Patient Education: Go to Patient Education Activity  Goal: Patient/Family Education  Outcome: Progressing Towards Goal     Problem: Pressure Injury - Risk of  Goal: *Prevention of pressure injury  Description: Document Jhonny Scale and appropriate interventions in the flowsheet.   Outcome: Progressing Towards Goal  Note: Pressure Injury Interventions:       Moisture Interventions: Minimize layers, Moisture barrier    Activity Interventions: Increase time out of bed, PT/OT evaluation    Mobility Interventions: HOB 30 degrees or less    Nutrition Interventions: Document food/fluid/supplement intake    Friction and Shear Interventions: Apply protective barrier, creams and emollients, Lift team/patient mobility team, Lift sheet                Problem: Patient Education: Go to Patient Education Activity  Goal: Patient/Family Education  Outcome: Progressing Towards Goal

## 2020-12-07 PROBLEM — D64.9 ANEMIA: Status: ACTIVE | Noted: 2020-12-07

## 2020-12-07 LAB
ANION GAP SERPL CALC-SCNC: 8 MMOL/L
ATRIAL RATE: 118 BPM
BASOPHILS # BLD: 0 K/UL
BASOPHILS NFR BLD: 0 %
BUN SERPL-MCNC: 13 MG/DL (ref 9–21)
BUN/CREAT SERPL: 19
CA-I BLD-MCNC: 8.5 MG/DL (ref 8.5–10.5)
CALCULATED P AXIS, ECG09: 84 DEGREES
CALCULATED R AXIS, ECG10: 85 DEGREES
CALCULATED T AXIS, ECG11: -89 DEGREES
CHLORIDE SERPL-SCNC: 106 MMOL/L (ref 94–111)
CO2 SERPL-SCNC: 24 MMOL/L (ref 21–33)
CREAT SERPL-MCNC: 0.7 MG/DL (ref 0.8–1.5)
DIAGNOSIS, 93000: NORMAL
DIFFERENTIAL METHOD BLD: ABNORMAL
EOSINOPHIL # BLD: 0.1 K/UL
EOSINOPHIL NFR BLD: 1 %
ERYTHROCYTE [DISTWIDTH] IN BLOOD BY AUTOMATED COUNT: 16.9 % (ref 11.6–14.5)
GLUCOSE BLD STRIP.AUTO-MCNC: 121 MG/DL (ref 70–110)
GLUCOSE SERPL-MCNC: 104 MG/DL (ref 70–110)
HCT VFR BLD AUTO: 21.2 % (ref 36–48)
HGB BLD-MCNC: 6.1 G/DL (ref 13–16)
IMM GRANULOCYTES # BLD AUTO: 0 K/UL
IMM GRANULOCYTES NFR BLD AUTO: 0 %
IRON SATN MFR SERPL: 18 % (ref 20–50)
IRON SERPL-MCNC: 42 UG/DL (ref 35–150)
LYMPHOCYTES # BLD: 2.6 K/UL
LYMPHOCYTES NFR BLD: 23 %
MCH RBC QN AUTO: 27.7 PG (ref 24–34)
MCHC RBC AUTO-ENTMCNC: 28.8 G/DL (ref 31–37)
MCV RBC AUTO: 96.4 FL (ref 74–97)
MONOCYTES # BLD: 1.5 K/UL
MONOCYTES NFR BLD: 13 %
NEUTS BAND NFR BLD MANUAL: 1 %
NEUTS SEG # BLD: 7.2 K/UL
NEUTS SEG NFR BLD: 62 %
NRBC # BLD: 0.11 K/UL
NRBC BLD MANUAL-RTO: 1 PER 100 WBC
P-R INTERVAL, ECG05: 122 MS
PERFORMED BY, TECHID: ABNORMAL
PLATELET # BLD AUTO: 526 K/UL (ref 135–420)
PLATELET COMMENTS,PCOM: ABNORMAL
PMV BLD AUTO: 8.3 FL (ref 9.2–11.8)
POTASSIUM SERPL-SCNC: 3.4 MMOL/L (ref 3.2–5.1)
Q-T INTERVAL, ECG07: 337 MS
QRS DURATION, ECG06: 87 MS
QTC CALCULATION (BEZET), ECG08: 473 MS
RBC # BLD AUTO: 2.2 M/UL (ref 4.7–5.5)
RBC MORPH BLD: ABNORMAL
SODIUM SERPL-SCNC: 138 MMOL/L (ref 135–145)
TIBC SERPL-MCNC: 231 UG/DL (ref 250–450)
VENTRICULAR RATE, ECG03: 118 BPM
WBC # BLD AUTO: 11.5 K/UL (ref 4.6–13.2)

## 2020-12-07 PROCEDURE — 86900 BLOOD TYPING SEROLOGIC ABO: CPT

## 2020-12-07 PROCEDURE — 85025 COMPLETE CBC W/AUTO DIFF WBC: CPT

## 2020-12-07 PROCEDURE — 74011250637 HC RX REV CODE- 250/637: Performed by: PHYSICIAN ASSISTANT

## 2020-12-07 PROCEDURE — 65270000029 HC RM PRIVATE

## 2020-12-07 PROCEDURE — 74011000258 HC RX REV CODE- 258: Performed by: PHYSICIAN ASSISTANT

## 2020-12-07 PROCEDURE — 82272 OCCULT BLD FECES 1-3 TESTS: CPT

## 2020-12-07 PROCEDURE — 82962 GLUCOSE BLOOD TEST: CPT

## 2020-12-07 PROCEDURE — 83540 ASSAY OF IRON: CPT

## 2020-12-07 PROCEDURE — 36415 COLL VENOUS BLD VENIPUNCTURE: CPT

## 2020-12-07 PROCEDURE — 74011250637 HC RX REV CODE- 250/637: Performed by: INTERNAL MEDICINE

## 2020-12-07 PROCEDURE — 80048 BASIC METABOLIC PNL TOTAL CA: CPT

## 2020-12-07 PROCEDURE — 74011250636 HC RX REV CODE- 250/636: Performed by: PHYSICIAN ASSISTANT

## 2020-12-07 RX ORDER — SODIUM CHLORIDE 9 MG/ML
250 INJECTION, SOLUTION INTRAVENOUS AS NEEDED
Status: DISCONTINUED | OUTPATIENT
Start: 2020-12-07 | End: 2020-12-08 | Stop reason: HOSPADM

## 2020-12-07 RX ADMIN — ACETAMINOPHEN 650 MG: 325 TABLET ORAL at 01:42

## 2020-12-07 RX ADMIN — DOCUSATE SODIUM 100 MG: 100 CAPSULE, LIQUID FILLED ORAL at 17:06

## 2020-12-07 RX ADMIN — ENOXAPARIN SODIUM 40 MG: 40 INJECTION SUBCUTANEOUS at 09:18

## 2020-12-07 RX ADMIN — SODIUM CHLORIDE 75 ML/HR: 4.5 INJECTION, SOLUTION INTRAVENOUS at 23:58

## 2020-12-07 RX ADMIN — PIPERACILLIN AND TAZOBACTAM 3.38 G: 3; .375 INJECTION, POWDER, LYOPHILIZED, FOR SOLUTION INTRAVENOUS at 05:38

## 2020-12-07 RX ADMIN — CARVEDILOL 6.25 MG: 6.25 TABLET, FILM COATED ORAL at 16:39

## 2020-12-07 RX ADMIN — MIRTAZAPINE 7.5 MG: 15 TABLET, FILM COATED ORAL at 21:34

## 2020-12-07 RX ADMIN — ACETAMINOPHEN 650 MG: 325 TABLET ORAL at 21:37

## 2020-12-07 RX ADMIN — PIPERACILLIN AND TAZOBACTAM 3.38 G: 3; .375 INJECTION, POWDER, LYOPHILIZED, FOR SOLUTION INTRAVENOUS at 14:51

## 2020-12-07 RX ADMIN — CARVEDILOL 6.25 MG: 6.25 TABLET, FILM COATED ORAL at 08:04

## 2020-12-07 RX ADMIN — ATORVASTATIN CALCIUM 40 MG: 40 TABLET, FILM COATED ORAL at 21:34

## 2020-12-07 RX ADMIN — CLOPIDOGREL BISULFATE 75 MG: 75 TABLET ORAL at 09:19

## 2020-12-07 RX ADMIN — SODIUM CHLORIDE 75 ML/HR: 4.5 INJECTION, SOLUTION INTRAVENOUS at 09:18

## 2020-12-07 RX ADMIN — PIPERACILLIN AND TAZOBACTAM 3.38 G: 3; .375 INJECTION, POWDER, LYOPHILIZED, FOR SOLUTION INTRAVENOUS at 21:39

## 2020-12-07 RX ADMIN — DOCUSATE SODIUM 100 MG: 100 CAPSULE, LIQUID FILLED ORAL at 09:19

## 2020-12-07 RX ADMIN — FERROUS SULFATE TAB 325 MG (65 MG ELEMENTAL FE) 325 MG: 325 (65 FE) TAB at 08:04

## 2020-12-07 RX ADMIN — CETIRIZINE HYDROCHLORIDE 10 MG: 10 TABLET, FILM COATED ORAL at 09:19

## 2020-12-07 RX ADMIN — ASPIRIN 81 MG: 81 TABLET, COATED ORAL at 09:19

## 2020-12-07 NOTE — PROGRESS NOTES
Administered Po meds and started Zosyn. Patient tolerated well. Left patient watching tv. Bed in lowest position and call bell in reach.

## 2020-12-07 NOTE — ASSESSMENT & PLAN NOTE
-Patient continues on Zosyn  -Blood cultures show no growth at 24 hours  -Gram stain shows rare WBCs otherwise no organisms found final results pending  -No leukocytosis or fever

## 2020-12-07 NOTE — PROGRESS NOTES
P.T. screen performed, spoke with nursing, they state he normally uses a w/c at home. No current P.T. needs noted at this time.   Shauna Hendricks, PT, DPT

## 2020-12-07 NOTE — PROGRESS NOTES
Problem: Falls - Risk of  Goal: *Absence of Falls  Description: Document Renee Dover Fall Risk and appropriate interventions in the flowsheet. Outcome: Progressing Towards Goal  Note: Fall Risk Interventions:  Mobility Interventions: Utilize walker, cane, or other assistive device    Mentation Interventions: Door open when patient unattended, Room close to nurse's station, More frequent rounding    Medication Interventions: Bed/chair exit alarm    Elimination Interventions: Call light in reach, Urinal in reach, Patient to call for help with toileting needs              Problem: Patient Education: Go to Patient Education Activity  Goal: Patient/Family Education  Outcome: Progressing Towards Goal     Problem: Pressure Injury - Risk of  Goal: *Prevention of pressure injury  Description: Document Jhonny Scale and appropriate interventions in the flowsheet.   Outcome: Progressing Towards Goal  Note: Pressure Injury Interventions:       Moisture Interventions: Absorbent underpads    Activity Interventions: Increase time out of bed    Mobility Interventions: HOB 30 degrees or less    Nutrition Interventions: Document food/fluid/supplement intake    Friction and Shear Interventions: Apply protective barrier, creams and emollients, Lift team/patient mobility team, Lift sheet                Problem: Patient Education: Go to Patient Education Activity  Goal: Patient/Family Education  Outcome: Progressing Towards Goal     Problem: Nutrition Deficit  Goal: *Optimize nutritional status  Outcome: Progressing Towards Goal

## 2020-12-07 NOTE — ASSESSMENT & PLAN NOTE
-Blood pressures have improved however patient does remain somewhat tachycardic  -Not shortness of breath  -Generalized weakness  -CBC not available for rounds in the morning will order now

## 2020-12-07 NOTE — PROGRESS NOTES
Physician Progress Note      PATIENT:               Soha Tavares  CSN #:                  [id number]  :                       1955  ADMIT DATE:       2020 5:13 PM  [address] [place] DATE:  RESPONDING  PROVIDER #:        Alphonsa Hermes MD Terre Severance MD          QUERY TEXT:    Dear hospitalist,    Patient admitted with sepsis and open wound to foot. Noted to also have diabetic ulcers per nursing; however, ED note states pressure ulcers. If possible, please document in progress notes and discharge summary the type of ulcers and the site of the ulcers: The medical record reflects the following:    Risk Factors: diabetes, PVD    Clinical Indicators:  - per nursing: diabetic ulcer to L heel, diabetic ulcer to L foot, pretibial scab  - per ED note: Moderate-sized pressure sore to the left shin, that is well-healing ; Large pressure sore to the dorsum of the left foot with small amount of exudate. No evidence of infection ; Large painless, necrotic area to the left heel. Treatment: receiving wound care per nursing    Please call me for any questions,  Thank you,  Krunal Nguyen RN, [facility]  [phone]  Options provided:  -- Decubitus Pressure Ulcer to (site/location) present on admission, please specify sites. -- Diabetic Ulcer to (site/location) present on admission, please specify sites. -- Other - I will add my own diagnosis  -- Disagree - Not applicable / Not valid  -- Disagree - Clinically unable to determine / Unknown  -- Refer to Clinical Documentation Reviewer    PROVIDER RESPONSE TEXT:    This patient has a decubitus pressure ulcer to (site/location) that was present on admission Left Heel, Left pretibial area    Query created by: Nakita Carver on 2020 1:55 PM      QUERY TEXT:    Dear hospitalist,    Pt admitted with sepsis. Noted documentation of severe malnutrition on dietitian note dated . Please clarify if you concur.   If possible, please document in progress notes and discharge summary:    The medical record reflects the following:    Risk Factors: 77years of age, DM    Clinical Indicators: per dietitian note:  * Malnutrition Assessment:  Malnutrition Status:  Severe malnutrition(BMI 14.1, very thin albumin 2.9 pt with left foot ulcer as well reports did not like food at NH so did not eat resulting in 15 lbs wt loss)  Context:  Acute illness  Findings of the 6 clinical characteristics of malnutrition:  Energy Intake:  7 - 50% or less of est energy requirements for 5 or more days  Weight Loss:  7.0 - Greater than 5% over 1 month  Body Fat Loss:  7 - Moderate body fat loss,  Muscle Mass Loss:  7 - Moderate muscle mass loss, Clavicles (pectoralis & deltoids), Temples (temporalis)  Fluid Accumulation:  No significant fluid accumulation,   Strength:  Not performed  * Nutrition Diagnosis: Inadequate oral intake, Increased nutrient needs, Underweight, Altered nutrition-related lab values related to acute injury/trauma, inadequate protein-energy intake, increased demand for energy/nutrients as evidenced by intake 26-50%, wounds, BMI, weight loss greater than or equal to 10% in 6 months    Treatment: dietitian following:  Nutrition Recommendations/Plan: Diabetic/Cardiac diet 2G Na restriction start Glucerna BID    Please call me for any questions,  Thank you,  Tree Lewis RN, [facility]  [phone]  Options provided:  -- Severe malnutrition confirmed present on admission  -- Severe malnutrition ruled out (please include a corresponding diagnosis for patient?s clinical picture and treatment), please include a corresponding diagnosis for patient?s clinical picture and treatment. -- Other - I will add my own diagnosis  -- Disagree - Not applicable / Not valid  -- Disagree - Clinically unable to determine / Unknown  -- Refer to Clinical Documentation Reviewer    PROVIDER RESPONSE TEXT:    The diagnosis of severe malnutrition is confirmed as present on admission.     Query created by: Travis Head on 12/7/2020 2:01 PM      Electronically signed by:  Shirley Medrano MD [employer] MD 12/7/2020 4:32 PM

## 2020-12-07 NOTE — PROGRESS NOTES
JULITA Ibarra aware of 6.1 Hgb level, states he would like to wait and see what the iron panel shows before proceeding with orders.

## 2020-12-07 NOTE — ASSESSMENT & PLAN NOTE
-Remains mildly elevated  -We will continue with home medications  -We will continue to monitor with medication adjustment as clinically indicated

## 2020-12-07 NOTE — ASSESSMENT & PLAN NOTE
-CBC shows low hemoglobin and hematocrit at 6.1 and 21.2, respectively  -We will obtain an iron panel  -We will initiate possible transfusion as clinically indicated  -We will type and screen now

## 2020-12-07 NOTE — PROGRESS NOTES
Went to restart patient Iv. Patient complained of headache. Administered Tylenol. Left patient going back to sleep.   Bed in lowest position and call bell in reach

## 2020-12-07 NOTE — PROGRESS NOTES
Bedside and Verbal shift change report given to Francie Ghosh Rn (oncoming nurse) Lincoln Meyer (offgoing nurse). Report included the following information SBAR, Kardex, Intake/Output, Recent Results and Dual Neuro Assessment.

## 2020-12-07 NOTE — PROGRESS NOTES
Hgb noted to be 6.2 as of 1300 on 12/6, on 12/4 hgb was 8.6, JULITA Ibarra notified, repeat labs ordered.

## 2020-12-07 NOTE — PROGRESS NOTES
Patient assessment completed. Warmed up patients dinner. Left patient eating and watching tv. Bed in lowest position and call bell in reach.

## 2020-12-07 NOTE — PROGRESS NOTES
conducted an initial consultation and Spiritual Assessment for Mahendra Amezcua, who is a 77 y.o.,male. Patients Primary Language is: [place]. According to the patients EMR Spiritism Affiliation is: Geovani Angel. The reason the Patient came to the hospital is:   Patient Active Problem List    Diagnosis Date Noted    Anemia 12/07/2020    COPD (chronic obstructive pulmonary disease) ([facility] [address] 12/06/2020    CHF (congestive heart failure) ([facility] [address] 12/06/2020    CKD (chronic kidney disease) 12/06/2020    Sepsis ([facility] [address] 12/04/2020    Lactic acid increased 12/04/2020    Hypotension 12/04/2020    Wound, open, foot 12/04/2020    Hx of CABG 12/04/2020    CAD (coronary artery disease) 12/04/2020    Hyperlipidemia, mixed 12/04/2020    Depression, major 12/04/2020    HTN (hypertension), benign 12/04/2020    Tachycardia 12/04/2020    Chronic atrial fibrillation ([facility] 12/04/2020        The  provided the following Interventions:  Initiated a relationship of care and support. Explored issues of vannessa, spirituality and/or Baptism needs while hospitalized. Listened empathically. Provided chaplaincy education. Provided information about Spiritual Care Services. Offered prayer and assurance of continued prayers on patient's behalf. Chart reviewed. The following outcomes were achieved:  Patient shared some information about their medical narrative and spiritual journey/beliefs. Patient processed feeling about current hospitalization. Patient expressed gratitude for the 's visit. Assessment:  Patient did not indicate any spiritual or Baptism issues which require Spiritual Care Services interventions at this time. Patient does not have any Baptism/cultural needs that will affect patients preferences in health care. Plan:  Chaplains will continue to follow and will provide pastoral care on an as needed or requested basis.    recommends bedside caregivers page  on duty if patient shows signs of acute spiritual or emotional distress.     [address] [facility]   Staff [address] [facility]   [phone]

## 2020-12-07 NOTE — PROGRESS NOTES
Progress Note  Date:12/7/2020       Room:[facility]  Patient Name:Miguelito Palomino     YOB: 1955     Age:65 y.o. Jocelyne Nair is a 77 y.o. male with past medical history of chronic A. fib no anticoagulation hypertension CAD status post CABG MI PVD on Plavix aspirin recently was following-up with his  primary care physician today when his blood pressure was 90 systolic and patient was feeling weak . He continues to deny chest pain or shortness of breath. With further discussion he does endorse appeared of time of increased fatigue and symptoms of anemia/low hemoglobin hematocrit. Radha plan of care to initiate transfusion of 2 units of PRBCs and recheck of his hemoglobin hematocrit. Objective           Vitals Last 24 Hours:  Patient Vitals for the past 24 hrs:   Temp Pulse Resp BP SpO2   12/07/20 0400 (!) 96.1 °F (35.6 °C) (!) 131 19 (!) 170/92 100 %   12/07/20 0019 (!) 96.6 °F (35.9 °C) (!) 111 17 (!) 165/90 100 %   12/06/20 2000 98.2 °F (36.8 °C) (!) 116 20 (!) 149/83 100 %   12/06/20 0804 98.7 °F (37.1 °C) (!) 109 20 (!) 157/92 --        I/O (24Hr): Intake/Output Summary (Last 24 hours) at 12/7/2020 0558  Last data filed at 12/6/2020 2307  Gross per 24 hour   Intake 4176.75 ml   Output 300 ml   Net 3876.75 ml       Physical Exam:  General Appearance:  Comfortable, well-appearing. No acute distress. HEENT: NCAT, PERRL, No deformities or discharge, Neck supple with trachea midline. Respiratory: Normal respiratory rate. Breath sounds clear to auscultation. Heart: S1 normal and S2 normal.  No tachycardia  Abdomen: Soft and flat. Bowel sounds are normal. No rebound or guarding. No organomegaly. Extremities: Normal range of motion. No acute deformities. No peripheral edema. Pulses: Distal pulses are intact. Neurological: Alert and oriented to person, place and time. Grossly intact. Skin:  Warm and dry. No acute lesions.     Medications           Current Facility-Administered Medications   Medication Dose Route Frequency    sodium chloride (NS) flush 5-40 mL  5-40 mL IntraVENous PRN    atorvastatin (LIPITOR) tablet 40 mg  40 mg Oral QHS    0.45% sodium chloride infusion  75 mL/hr IntraVENous CONTINUOUS    carvediloL (COREG) tablet 6.25 mg  6.25 mg Oral BID WITH MEALS    oxyCODONE-acetaminophen (PERCOCET) 5-325 mg per tablet 1 Tab  1 Tab Oral Q6H PRN    cetirizine (ZYRTEC) tablet 10 mg  10 mg Oral DAILY    docusate sodium (COLACE) capsule 100 mg  100 mg Oral BID    sodium chloride (NS) flush 5-40 mL  5-40 mL IntraVENous PRN    acetaminophen (TYLENOL) tablet 650 mg  650 mg Oral Q6H PRN    Or    acetaminophen (TYLENOL) suppository 650 mg  650 mg Rectal Q6H PRN    bisacodyL (DULCOLAX) suppository 10 mg  10 mg Rectal DAILY PRN    promethazine (PHENERGAN) tablet 12.5 mg  12.5 mg Oral Q6H PRN    Or    ondansetron (ZOFRAN) injection 4 mg  4 mg IntraVENous Q6H PRN    enoxaparin (LOVENOX) injection 40 mg  40 mg SubCUTAneous DAILY    piperacillin-tazobactam (ZOSYN) 3.375 g in 0.9% sodium chloride (MBP/ADV) 100 mL MBP  3.375 g IntraVENous Q8H    aspirin delayed-release tablet 81 mg  81 mg Oral DAILY    clopidogreL (PLAVIX) tablet 75 mg  75 mg Oral DAILY    ferrous sulfate tablet 325 mg  325 mg Oral ACB    mirtazapine (REMERON) tablet 7.5 mg  7.5 mg Oral QHS         Allergies         Patient has no known allergies.        Labs/Imaging/Diagnostics      Labs:  Recent Results (from the past 24 hour(s))   CBC WITH AUTOMATED DIFF    Collection Time: 12/06/20  1:10 PM   Result Value Ref Range    WBC 11.8 4.6 - 13.2 K/uL    RBC 2.29 (L) 4.70 - 5.50 M/uL    HGB 6.2 (L) 13.0 - 16.0 g/dL    HCT 21.8 (L) 36.0 - 48.0 %    MCV 95.2 74.0 - 97.0 FL    MCH 27.1 24.0 - 34.0 PG    MCHC 28.4 (L) 31.0 - 37.0 g/dL    RDW 16.8 (H) 11.6 - 14.5 %    PLATELET 439 (H) 819 - 420 K/uL    MPV 8.8 (L) 9.2 - 11.8 FL    NEUTROPHILS 68 40 - 73 %    LYMPHOCYTES 22 21 - 52 %    MONOCYTES 9 3 - 10 %    EOSINOPHILS 1 0 - 5 %    BASOPHILS 0 0 - 2 %    IMMATURE GRANULOCYTES 0 %    ABS. NEUTROPHILS 8.0 1.8 - 8.0 K/UL    ABS. LYMPHOCYTES 2.6 0.9 - 3.6 K/UL    ABS. MONOCYTES 1.1 0.05 - 1.2 K/UL    ABS. EOSINOPHILS 0.1 0.0 - 0.4 K/UL    ABS. BASOPHILS 0.0 0.0 - 0.1 K/UL    ABS. IMM. GRANS. 0.0 K/UL    RBC COMMENTS Microcytosis  2+        RBC COMMENTS Hypochromia  1+       METABOLIC PANEL, COMPREHENSIVE    Collection Time: 12/06/20  1:10 PM   Result Value Ref Range    Sodium 137 135 - 145 mmol/L    Potassium 3.6 3.2 - 5.1 mmol/L    Chloride 106 94 - 111 mmol/L    CO2 25 21 - 33 mmol/L    Anion gap 6 mmol/L    Glucose 114 (H) 70 - 110 mg/dL    BUN 10 9 - 21 mg/dL    Creatinine 0.50 (L) 0.8 - 1.50 mg/dL    BUN/Creatinine ratio 20      GFR est AA >60 ml/min/1.73m2    GFR est non-AA >60 ml/min/1.73m2    Calcium 8.4 (L) 8.5 - 10.5 mg/dL    Bilirubin, total 0.4 0.2 - 1.0 mg/dL    AST (SGOT) 14 (L) 17 - 74 U/L    ALT (SGPT) 9 3 - 72 U/L    Alk.  phosphatase 76 38 - 126 U/L    Protein, total 6.7 6.1 - 8.4 g/dL    Albumin 2.3 (L) 3.5 - 4.7 g/dL    Globulin 4.4 g/dL    A-G Ratio 0.5     METABOLIC PANEL, BASIC    Collection Time: 12/07/20  1:10 AM   Result Value Ref Range    Sodium 138 135 - 145 mmol/L    Potassium 3.4 3.2 - 5.1 mmol/L    Chloride 106 94 - 111 mmol/L    CO2 24 21 - 33 mmol/L    Anion gap 8 mmol/L    Glucose 104 70 - 110 mg/dL    BUN 13 9 - 21 mg/dL    Creatinine 0.70 (L) 0.8 - 1.50 mg/dL    BUN/Creatinine ratio 19      GFR est AA >60 ml/min/1.73m2    GFR est non-AA >60 ml/min/1.73m2    Calcium 8.5 8.5 - 10.5 mg/dL   CBC WITH AUTOMATED DIFF    Collection Time: 12/07/20  1:10 AM   Result Value Ref Range    WBC 11.5 4.6 - 13.2 K/uL    RBC 2.20 (L) 4.70 - 5.50 M/uL    HGB 6.1 (L) 13.0 - 16.0 g/dL    HCT 21.2 (L) 36.0 - 48.0 %    MCV 96.4 74.0 - 97.0 FL    MCH 27.7 24.0 - 34.0 PG    MCHC 28.8 (L) 31.0 - 37.0 g/dL    RDW 16.9 (H) 11.6 - 14.5 %    PLATELET 669 (H) 952 - 420 K/uL    MPV 8.3 (L) 9.2 - 11.8 FL NEUTROPHILS 62 %    BAND NEUTROPHILS 1 %    LYMPHOCYTES 23 %    MONOCYTES 13 %    EOSINOPHILS 1 %    BASOPHILS 0 %    NRBC 1.0  WBC    IMMATURE GRANULOCYTES 0 %    ABS. NEUTROPHILS 7.2 K/UL    ABS. LYMPHOCYTES 2.6 K/UL    ABS. MONOCYTES 1.5 K/UL    ABS. EOSINOPHILS 0.1 K/UL    ABS. BASOPHILS 0.0 K/UL    ABSOLUTE NRBC 0.11 K/uL    ABS. IMM. GRANS. 0.0 K/UL    DF Manual      PLATELET COMMENTS PLATELETS APPEAR INCREASED     RBC COMMENTS Anisocytosis  1+        RBC COMMENTS Hypochromia  2+        RBC COMMENTS Stomatocytes  1+            Trended key labs include:  Recent Labs     12/07/20  0110 12/06/20  1310 12/04/20  1730   WBC 11.5 11.8 11.6   HGB 6.1* 6.2* 8.6*   HCT 21.2* 21.8* 29.0*   * 527* 702*     Recent Labs     12/07/20  0110 12/06/20  1310 12/04/20  1730    137 141   K 3.4 3.6 3.8    106 104   CO2 24 25 28    114* 142*   BUN 13 10 19   CREA 0.70* 0.50* 1.00   CA 8.5 8.4* 9.7   ALB  --  2.3* 2.9*   TBILI  --  0.4 0.5   ALT  --  9 9       Imaging Last 24 Hours:  No results found.     Assessment//Plan           Patient Active Problem List    Diagnosis Date Noted    Anemia 12/07/2020    COPD (chronic obstructive pulmonary disease) ([facility] 12/06/2020    CHF (congestive heart failure) ([facility] 12/06/2020    CKD (chronic kidney disease) 12/06/2020    Sepsis ([facility] 12/04/2020    Lactic acid increased 12/04/2020    Hypotension 12/04/2020    Wound, open, foot 12/04/2020    Hx of CABG 12/04/2020    CAD (coronary artery disease) 12/04/2020    Hyperlipidemia, mixed 12/04/2020    Depression, major 12/04/2020    HTN (hypertension), benign 12/04/2020    Tachycardia 12/04/2020    Chronic atrial fibrillation ([facility] 12/04/2020        Hypotension  -Blood pressure has been stable  -Not shortness of breath  -Generalized weakness likely secondary to anemia  -Hemoglobin 6.1 will transfuse 2 units    Sepsis (HCC)  -Discontinue Zosyn  -Blood cultures show no growth at 3 days  -No leukocytosis or fever    Wound, open, foot  -Continue with wound care bedside  -Gram stain rare WBCs otherwise no microorganisms found      CAD (coronary artery disease)  -Continue on home medication  -Stable    Hyperlipidemia, mixed  -Continue on home medication  -Stable    Depression, major  -Continue on home medication  -Stable    HTN (hypertension), benign  -Remains mildly elevated  -We will continue with home medications  -We will continue to monitor with medication adjustment as clinically indicated    Chronic atrial fibrillation (HCC)  -Mild tachycardia  -A. fib rate controlled  -On Plavix, aspirin, carvedilol  -Continue on telemetry    Anemia  -CBC shows low hemoglobin and hematocrit at 6.1 and 21.2, respectively  -Initiate transfusion of 2 units PRBCs        Code status: Full   Prophylaxis: SCD and Lovenox    Clinical time 35 minutes with >50% of visit spent in counseling and coordination of care      Electronically signed by Rigoberto Yu, PhD, PA-C on 12/7/2020 at 1:02 PM

## 2020-12-08 VITALS
WEIGHT: 93.5 LBS | HEIGHT: 67 IN | HEART RATE: 110 BPM | RESPIRATION RATE: 16 BRPM | BODY MASS INDEX: 14.67 KG/M2 | DIASTOLIC BLOOD PRESSURE: 92 MMHG | OXYGEN SATURATION: 100 % | TEMPERATURE: 98.3 F | SYSTOLIC BLOOD PRESSURE: 157 MMHG

## 2020-12-08 LAB
ANION GAP SERPL CALC-SCNC: 9 MMOL/L
BASOPHILS # BLD: 0 K/UL (ref 0–0.1)
BASOPHILS NFR BLD: 0 % (ref 0–2)
BUN SERPL-MCNC: 7 MG/DL (ref 9–21)
BUN/CREAT SERPL: 14
CA-I BLD-MCNC: 8.9 MG/DL (ref 8.5–10.5)
CHLORIDE SERPL-SCNC: 106 MMOL/L (ref 94–111)
CO2 SERPL-SCNC: 25 MMOL/L (ref 21–33)
COLLECT DATE STL: NORMAL
CREAT SERPL-MCNC: 0.5 MG/DL (ref 0.8–1.5)
EOSINOPHIL # BLD: 0.1 K/UL (ref 0–0.4)
EOSINOPHIL NFR BLD: 1 % (ref 0–5)
ERYTHROCYTE [DISTWIDTH] IN BLOOD BY AUTOMATED COUNT: 17.8 % (ref 11.6–14.5)
GLUCOSE SERPL-MCNC: 106 MG/DL (ref 70–110)
HCT VFR BLD AUTO: 27.8 % (ref 36–48)
HEMOCCULT SP1 STL QL: POSITIVE
HGB BLD-MCNC: 8.4 G/DL (ref 13–16)
IMM GRANULOCYTES # BLD AUTO: 0.1 K/UL
IMM GRANULOCYTES NFR BLD AUTO: 0 %
LYMPHOCYTES # BLD: 2.3 K/UL (ref 0.9–3.6)
LYMPHOCYTES NFR BLD: 19 % (ref 21–52)
MCH RBC QN AUTO: 27.8 PG (ref 24–34)
MCHC RBC AUTO-ENTMCNC: 30.2 G/DL (ref 31–37)
MCV RBC AUTO: 92.1 FL (ref 74–97)
MONOCYTES # BLD: 1.1 K/UL (ref 0.05–1.2)
MONOCYTES NFR BLD: 9 % (ref 3–10)
NEUTS SEG # BLD: 8.7 K/UL (ref 1.8–8)
NEUTS SEG NFR BLD: 71 % (ref 40–73)
PLATELET # BLD AUTO: 480 K/UL (ref 135–420)
PMV BLD AUTO: 8.8 FL (ref 9.2–11.8)
POTASSIUM SERPL-SCNC: 3.2 MMOL/L (ref 3.2–5.1)
RBC # BLD AUTO: 3.02 M/UL (ref 4.7–5.5)
SODIUM SERPL-SCNC: 140 MMOL/L (ref 135–145)
WBC # BLD AUTO: 12.2 K/UL (ref 4.6–13.2)

## 2020-12-08 PROCEDURE — 30233N1 TRANSFUSION OF NONAUTOLOGOUS RED BLOOD CELLS INTO PERIPHERAL VEIN, PERCUTANEOUS APPROACH: ICD-10-PCS | Performed by: INTERNAL MEDICINE

## 2020-12-08 PROCEDURE — 36430 TRANSFUSION BLD/BLD COMPNT: CPT

## 2020-12-08 PROCEDURE — 97161 PT EVAL LOW COMPLEX 20 MIN: CPT

## 2020-12-08 PROCEDURE — 74011250636 HC RX REV CODE- 250/636: Performed by: PHYSICIAN ASSISTANT

## 2020-12-08 PROCEDURE — 36415 COLL VENOUS BLD VENIPUNCTURE: CPT

## 2020-12-08 PROCEDURE — 74011250637 HC RX REV CODE- 250/637: Performed by: INTERNAL MEDICINE

## 2020-12-08 PROCEDURE — 74011000258 HC RX REV CODE- 258: Performed by: PHYSICIAN ASSISTANT

## 2020-12-08 PROCEDURE — 74011250637 HC RX REV CODE- 250/637: Performed by: PHYSICIAN ASSISTANT

## 2020-12-08 PROCEDURE — 80048 BASIC METABOLIC PNL TOTAL CA: CPT

## 2020-12-08 PROCEDURE — 85025 COMPLETE CBC W/AUTO DIFF WBC: CPT

## 2020-12-08 PROCEDURE — P9016 RBC LEUKOCYTES REDUCED: HCPCS

## 2020-12-08 RX ORDER — FUROSEMIDE 10 MG/ML
40 INJECTION INTRAMUSCULAR; INTRAVENOUS ONCE
Status: COMPLETED | OUTPATIENT
Start: 2020-12-08 | End: 2020-12-08

## 2020-12-08 RX ADMIN — PIPERACILLIN AND TAZOBACTAM 3.38 G: 3; .375 INJECTION, POWDER, LYOPHILIZED, FOR SOLUTION INTRAVENOUS at 06:05

## 2020-12-08 RX ADMIN — ASPIRIN 81 MG: 81 TABLET, COATED ORAL at 09:29

## 2020-12-08 RX ADMIN — CETIRIZINE HYDROCHLORIDE 10 MG: 10 TABLET, FILM COATED ORAL at 09:30

## 2020-12-08 RX ADMIN — CARVEDILOL 6.25 MG: 6.25 TABLET, FILM COATED ORAL at 09:29

## 2020-12-08 RX ADMIN — FERROUS SULFATE TAB 325 MG (65 MG ELEMENTAL FE) 325 MG: 325 (65 FE) TAB at 06:30

## 2020-12-08 RX ADMIN — CARVEDILOL 6.25 MG: 6.25 TABLET, FILM COATED ORAL at 16:54

## 2020-12-08 RX ADMIN — CLOPIDOGREL BISULFATE 75 MG: 75 TABLET ORAL at 09:29

## 2020-12-08 RX ADMIN — FUROSEMIDE 40 MG: 10 INJECTION, SOLUTION INTRAMUSCULAR; INTRAVENOUS at 06:04

## 2020-12-08 RX ADMIN — SODIUM CHLORIDE 250 ML: 9 INJECTION, SOLUTION INTRAVENOUS at 00:28

## 2020-12-08 NOTE — PROGRESS NOTES
Pt. Called and stated he had an episode of loose stool and needed help to urinate. Assisted with urinal, cleaned pt. Of small black loose stool. New brief and bed pad provided. VS taken and WNL. Pt. Denies any pain at this itme. IVF runnign without issue. Assessment completed.  CBWR

## 2020-12-08 NOTE — PROGRESS NOTES
Cris completed, pt. Resting in bed, denies any pain, discomfort, or needs at this time. Will continue to monitor.  CBWR

## 2020-12-08 NOTE — PROGRESS NOTES
Blood transfusion consent signed by pt. Pt. Had a medium loose black stool. Assisted with lizy care and clean brief.

## 2020-12-08 NOTE — PROGRESS NOTES
Wound care completed to left foot per orders. Pt. Tolerated well. Resting quietly in bed with eyes closed. Will continue to monitor.

## 2020-12-08 NOTE — DISCHARGE SUMMARY
HOSPITALIST DISCHARGE NOTE  Rudi Brooks MD, [facility]  [address]       PATIENT ID: Jenna Barahona  MRN: [id number]   YOB: 1955    DATE OF ADMISSION: 12/4/2020  5:13 PM    DATE OF DISCHARGE: 12/08/20    PRIMARY CARE PROVIDER: Guero Davis MD     ATTENDING PHYSICIAN: Rudi Brooks MD  DISCHARGING PROVIDER: Rudi Brooks MD        CONSULTATIONS: None    PROCEDURES/SURGERIES: * No surgery found *    ADMITTING DIAGNOSES & HOSPITAL COURSE:     77 y.o. male with past medical history of chronic A. fib no anticoagulation hypertension CAD status post CABG MI PVD on Plavix aspirin recently was following-up with his  primary care physician today when his blood pressure was 90 systolic and patient was feeling weak . patient was sent to the ER for further evaluation of low blood pressure and weakness . upon arrival in the ER his blood pressure was 90 and his lactic acid was elevated at 2.2 his heart rate was in the 118 .because of his increased lactic acid and hypotension and increased tachycardia so he met criteria for sepsis . she was given IV fluid in the ER and started on Vanco and cefepime and referred to us for admission . he denies any chest pain or shortness of breath no fever . upon evaluation locate the patient left foot wound which does not seem to be infected however we will send a wound culture . blood culture was sent in the ER so we will continue antibiotic at this patient and observe for now . DISCHARGE DIAGNOSES / PLAN:      Hypotension  Blood pressure has been stable  Not shortness of breath  Resolved Generalized weakness likely secondary to anemia  Hemoglobin 6.1 with transfused 1 unit of PRBC with hemoglobin up to 8.4 noted. Acute on chronic anemia  No previous baseline known however hemoglobin down to 6.1. Required only 1 unit of PRBC for hemoglobin to come up to 8.4.   Guaiac positive stool with patient currently on aspirin and Plavix. Continue outpatient iron supplementation. GI bleeding  Likely Chronic and slow. No report at home per patient or daughter regarding melena, hematochezia or BRBPR at home. Patient is just recently transplanted from [place] to live with his daughter. Patient will require further GI work-up and outpatient GI consultation for colonoscopy. Per telephone consultation with cardiology, will continue aspirin Plavix with urgent GI follow-up.     Sepsis - ruled out  Discontinued Zosyn day 2  Blood cultures show no growth at 3 days  No leukocytosis or fever     Wound, open, left foot  Continue with wound care bedside  Pseudomonas noted however patient without any fevers, significant leukocytosis or signs or sequelae of sepsis or SIRS. Follow-up with general surgery, Dr. Mable Kaufman. D/c home on Levaquin 500mg PO Daily 10 Days    CAD (coronary artery disease)  Status post CABG x3 vessels in September 2020 in [place]. Patient is continued on aspirin, Plavix. Slow GI bleed noted with anemia requiring PRBC transfusion. Patient requires outpatient cardiology follow-up with appointment with Kayl[facility] Pascal cardiology. Also requires urgent gastroenterology follow-up.     Right BKA  Patient is wheelchair-bound. Hyperlipidemia, mixed  Continue on home medication  Stable     Depression, major  Continue on home medication  Stable     HTN (hypertension)  -Remains mildly elevated  Restart home hydralazine, lisinopril, Coreg.     Chronic atrial fibrillation (HCC)  A. fib rate controlled  On Plavix, aspirin, carvedilol  Continue on telemetry    Spoke to daughter/NEYDAA extensively, who is a employee of the hospital.  Explained urgent need for gastroenterology follow-up for colonoscopy given guaiac positive stools and significant anemia which was resolved with just 1 unit of PRBC.   Patient did not have any chest pain or shortness of breath associated other than Mild weakness. Also needs establishing with a cardiologist.  Made appointment with Anders Arredondo for cardiology. PENDING TEST RESULTS:   At the time of discharge the following test results are still pending: None    FOLLOW UP APPOINTMENTS:    Follow-up Information     Follow up With Specialties Details Why Levi Eason., MD Internal Medicine   [address] [address]  [phone]             DIET: Cardiac Diet    ACTIVITY: No lifting, Driving, or Strenuous exercise for 2-3 weeks    WOUND CARE: [address]:  Current Discharge Medication List      CONTINUE these medications which have NOT CHANGED    Details   senna (Senna) 8.6 mg tablet Take 1 Tab by mouth daily. loratadine (CLARITIN) 10 mg tablet Take 1 Tab by mouth daily. Qty: 90 Tab, Refills: 3    Associated Diagnoses: Seasonal allergic rhinitis due to fungal spores      hydrALAZINE (APRESOLINE) 25 mg tablet Take 1 Tab by mouth three (3) times daily. Qty: 270 Tab, Refills: 1    Associated Diagnoses: Benign essential HTN      atorvastatin (LIPITOR) 40 mg tablet Take 1 Tab by mouth daily. Qty: 90 Tab, Refills: 1    Associated Diagnoses: Mixed hyperlipidemia      lisinopriL (PRINIVIL, ZESTRIL) 5 mg tablet Take 1 Tab by mouth daily. Qty: 90 Tab, Refills: 1    Associated Diagnoses: Benign essential HTN      carvediloL (COREG) 6.25 mg tablet Take 1 Tab by mouth two (2) times daily (with meals). Qty: 180 Tab, Refills: 1    Associated Diagnoses: Benign essential HTN      clopidogreL (Plavix) 75 mg tab Take 1 Tab by mouth daily. Qty: 80 Tab, Refills: 2    Associated Diagnoses: Coronary artery disease involving native heart without angina pectoris, unspecified vessel or lesion type      aspirin delayed-release 81 mg tablet Take 1 Tab by mouth daily.   Qty: 80 Tab, Refills: 1    Associated Diagnoses: Coronary artery disease involving native heart without angina pectoris, unspecified vessel or lesion type      ferrous sulfate 325 mg (65 mg iron) tablet Take 1 Tab by mouth Daily (before breakfast). Qty: 90 Tab, Refills: 1    Associated Diagnoses: Iron deficiency anemia, unspecified iron deficiency anemia type      mirtazapine (REMERON) 15 mg tablet Take 7.5 mg by mouth nightly. Omeprazole delayed release (PRILOSEC D/R) 20 mg tablet Take 20 mg by mouth daily. Recent Days:  Recent Labs     12/08/20  0710 12/07/20  0110 12/06/20  1310   WBC 12.2 11.5 11.8   HGB 8.4* 6.1* 6.2*   HCT 27.8* 21.2* 21.8*   * 526* 527*     Recent Labs     12/08/20  0710 12/07/20  0110 12/06/20  1310    138 137   K 3.2 3.4 3.6    106 106   CO2 25 24 25    104 114*   BUN 7* 13 10   CREA 0.50* 0.70* 0.50*   CA 8.9 8.5 8.4*   ALB  --   --  2.3*   TBILI  --   --  0.4   ALT  --   --  9     No results for input(s): PH, PCO2, PO2, HCO3, FIO2 in the last 72 hours.     24 Hour Results:  Recent Results (from the past 24 hour(s))   GLUCOSE, POC    Collection Time: 12/07/20  4:18 PM   Result Value Ref Range    Glucose (POC) 121 (H) 70 - 110 mg/dL    Performed by Marily Briceno    OCCULT BLOOD, STOOL    Collection Time: 12/07/20 10:40 PM   Result Value Ref Range    Occult Blood,day 1 Positive      Day 1 date: 253,087     METABOLIC PANEL, BASIC    Collection Time: 12/08/20  7:10 AM   Result Value Ref Range    Sodium 140 135 - 145 mmol/L    Potassium 3.2 3.2 - 5.1 mmol/L    Chloride 106 94 - 111 mmol/L    CO2 25 21 - 33 mmol/L    Anion gap 9 mmol/L    Glucose 106 70 - 110 mg/dL    BUN 7 (L) 9 - 21 mg/dL    Creatinine 0.50 (L) 0.8 - 1.50 mg/dL    BUN/Creatinine ratio 14      GFR est AA >60 ml/min/1.73m2    GFR est non-AA >60 ml/min/1.73m2    Calcium 8.9 8.5 - 10.5 mg/dL   CBC WITH AUTOMATED DIFF    Collection Time: 12/08/20  7:10 AM   Result Value Ref Range    WBC 12.2 4.6 - 13.2 K/uL    RBC 3.02 (L) 4.70 - 5.50 M/uL    HGB 8.4 (L) 13.0 - 16.0 g/dL    HCT 27.8 (L) 36.0 - 48.0 %    MCV 92.1 74.0 - 97.0 FL    MCH 27.8 24.0 - 34.0 PG    MCHC 30.2 (L) 31.0 - 37.0 g/dL    RDW 17.8 (H) 11.6 - 14.5 %    PLATELET 498 (H) 122 - 420 K/uL    MPV 8.8 (L) 9.2 - 11.8 FL    NEUTROPHILS 71 40 - 73 %    LYMPHOCYTES 19 (L) 21 - 52 %    MONOCYTES 9 3 - 10 %    EOSINOPHILS 1 0 - 5 %    BASOPHILS 0 0 - 2 %    IMMATURE GRANULOCYTES 0 %    ABS. NEUTROPHILS 8.7 (H) 1.8 - 8.0 K/UL    ABS. LYMPHOCYTES 2.3 0.9 - 3.6 K/UL    ABS. MONOCYTES 1.1 0.05 - 1.2 K/UL    ABS. EOSINOPHILS 0.1 0.0 - 0.4 K/UL    ABS. BASOPHILS 0.0 0.0 - 0.1 K/UL    ABS. IMM. GRANS. 0.1 K/UL       NOTIFY YOUR PHYSICIAN FOR ANY OF THE FOLLOWING:   Fever over 101 degrees for 24 hours. Chest pain, shortness of breath, fever, chills, nausea, vomiting, diarrhea, change in mentation, falling, weakness, bleeding. Severe pain or pain not relieved by medications. Or, any other signs or symptoms that you may have questions about. DISPOSITION:  x Home With:   OT  PT x HH  RN       Long term SNF/Inpatient Rehab    Independent/assisted living    Hospice    Other:       PATIENT CONDITION AT DISCHARGE:     Functional status    Poor     Deconditioned    x Independent      Cognition   x  Lucid     Forgetful     Dementia      Catheters/lines (plus indication)    Doherty     PICC     PEG    x None      Code status   x  Full code     DNR      PHYSICAL EXAMINATION AT DISCHARGE:  General:          Alert, cooperative, no distress, appears stated age. Neck:               Supple, symmetrical  Lungs:             Clear to auscultation bilaterally. No Wheezing or Rhonchi. No rales. Chest wall:      No tenderness  No Accessory muscle use. Heart:              Regular  rhythm,  No  murmur   No edema  Abdomen:        Soft, non-tender. Not distended. Bowel sounds normal  Extremities:     No cyanosis. No clubbing,                            Skin turgor normal, Capillary refill normal.  Left foot with wound that is currently bandaged. Skin:                Not pale.   Not Jaundiced  No rashes Psych:             Not anxious or agitated. Neurologic:      Alert, moves all extremities, answers questions appropriately and responds to commands       CHRONIC MEDICAL DIAGNOSES:  Problem List as of 12/8/2020 Date Reviewed: 12/4/2020          Codes Class Noted - Resolved    Anemia ICD-10-CM: D64.9  ICD-9-CM: 285.9  12/7/2020 - Present        COPD (chronic obstructive pulmonary disease) ([facility] ICD-10-CM: J44.9  ICD-9-CM: 215  12/6/2020 - Present        CHF (congestive heart failure) ([facility] ICD-10-CM: I50.9  ICD-9-CM: 428.0  12/6/2020 - Present        CKD (chronic kidney disease) ICD-10-CM: N18.9  ICD-9-CM: 585.9  12/6/2020 - Present        Sepsis ([facility] ICD-10-CM: A41.9  ICD-9-CM: 038.9, 995.91  12/4/2020 - Present    Overview Signed 12/4/2020  9:35 PM by Jasvir Willis PA-C     Unknown etiology questionable wound infection however wound does not look much infected. We will do blood culture and wound culture. Will place patient on Zosyn for now             Lactic acid increased ICD-10-CM: E87.2  ICD-9-CM: 276.2  12/4/2020 - Present    Overview Signed 12/4/2020  9:37 PM by Jasvir Willis PA-C     From sepsis. Hydration and continue to monitor lactic acid and follow blood culture             * (Principal) Hypotension ICD-10-CM: I95.9  ICD-9-CM: 458.9  12/4/2020 - Present    Overview Signed 12/4/2020  9:38 PM by Jasvir Willis PA-C     Likely from sepsis. After IV fluid blood pressure is now up to 373 systolic. Continue with moderate hydration             Wound, open, foot ICD-10-CM: S91.309A  ICD-9-CM: 892.0  12/4/2020 - Present    Overview Addendum 12/4/2020 10:01 PM by Jasvir Willis PA-C     Left foot wound does not seem to be infected however will do wound culture and continue with dressing change.   Get x-ray of left foot             Hx of CABG ICD-10-CM: Z95.1  ICD-9-CM: V45.81  12/4/2020 - Present    Overview Signed 12/4/2020  9:39 PM by Jasvir Willis PA-C     Continue with medication and follow cardiology             CAD (coronary artery disease) ICD-10-CM: I25.10  ICD-9-CM: 414.00  12/4/2020 - Present    Overview Addendum 12/4/2020 10:02 PM by Evelia Gipson PA-C     With history of MI so we will continue Plavix and aspirin             Hyperlipidemia, mixed ICD-10-CM: E78.2  ICD-9-CM: 272.2  12/4/2020 - Present    Overview Signed 12/4/2020  9:40 PM by Evelia Gipson PA-C     Continue with statin             Depression, major ICD-10-CM: F32.9  ICD-9-CM: 296.20  12/4/2020 - Present    Overview Signed 12/4/2020  9:40 PM by Evelia Gipson PA-C     Continue medication             HTN (hypertension), benign ICD-10-CM: I10  ICD-9-CM: 401.1  12/4/2020 - Present    Overview Signed 12/4/2020  9:41 PM by Evelia Gipson PA-C     Hold BP meds as BP was low             Tachycardia ICD-10-CM: R00.0  ICD-9-CM: 785.0  12/4/2020 - Present    Overview Signed 12/4/2020  9:43 PM by Evelia Gipson PA-C     Likely from sepsis and dehydration.   Will monitor heart rate on telemetry and hydration             Chronic atrial fibrillation [facility]) ICD-10-CM: I48.20  ICD-9-CM: 427.31  12/4/2020 - Present    Overview Signed 12/4/2020  9:44 PM by Evelia Gipson PA-C     Now in sinus rhythm and none carvedilol                   Greater than 35 minutes were spent with the patient on counseling and coordination of care    Signed:   Ani Vallejo MD  12/8/2020  2:33 PM

## 2020-12-08 NOTE — PROGRESS NOTES
Patient discharged home with daughter. Discharge instructions reviewed with daughter. Iv and tele removed.

## 2020-12-08 NOTE — PROGRESS NOTES
Pt. Resting quietly in bed, states he feels fine just feeling \"weak\". VS WNL. Will continue to monitor.

## 2020-12-08 NOTE — PROGRESS NOTES
HS medication given, pt. Tolerated well. PRN tylenol given for pt. C/o headache 3/10. Pt. Aware he will be receiving blood, pt. Is A&O x4 and will be able to sign his own consent. Zosyn hung per orders. Fresh ice water given. No further needs voiced at this time.  CBWR

## 2020-12-08 NOTE — PROGRESS NOTES
Assisted pt. With urinal and cleaned of small watery black stool. NOW dose lasix and scheduled dose of zosyn and PO iron given per orders, pt. Tolerated well. Assisted pt. With urinal again before leaving room. No further needs voiced at this itme.  CBWR

## 2020-12-08 NOTE — PROGRESS NOTES
Patient will be going back Home today with [facility]. His daughter New Cristina [phone] will be transporting him home when she gets off from work. No concerns voiced.

## 2020-12-08 NOTE — PROGRESS NOTES
Transfusion complete. Spoke with JULITA Bowling about pt.  Being hypertensive, hold off on 2nd unit PRBC for now, he stated he will enter order for 40 mg lasix IV

## 2020-12-09 LAB
ABO + RH BLD: NORMAL
BACTERIA SPEC CULT: NORMAL
BACTERIA SPEC CULT: NORMAL
BLD PROD TYP BPU: NORMAL
BLD PROD TYP BPU: NORMAL
BLOOD GROUP ANTIBODIES SERPL: NEGATIVE
BPU ID: NORMAL
BPU ID: NORMAL
CROSSMATCH RESULT,%XM: NORMAL
CROSSMATCH RESULT,%XM: NORMAL
GRAM STN SPEC: NORMAL
GRAM STN SPEC: NORMAL
SPECIAL REQUESTS,SREQ: NORMAL
SPECIMEN EXP DATE BLD: NORMAL
STATUS OF UNIT,%ST: NORMAL
STATUS OF UNIT,%ST: NORMAL
TRANSFUSION STATUS PATIENT QL: NORMAL
TRANSFUSION STATUS PATIENT QL: NORMAL
UNIT DIVISION, %UDIV: 0
UNIT DIVISION, %UDIV: 0

## 2020-12-09 NOTE — PROGRESS NOTES
Problem: Mobility Impaired (Adult and Pediatric)  Goal: *Acute Goals and Plan of Care (Insert Text)  Description: Physical Therapy Goals  Initiated 12/8/2020   Pt is mod (I) with all bed mobility and reports spending most of his time in a wheelchair. Due to his functional status he is not a good candidate for skilled therapy. PLOF: Pt is non-ambulatory and primary uses a wheelchair for mobility around the home. Outcome: Resolved/Met   PHYSICAL THERAPY EVALUATION AND DISCHARGE    Patient: Marcy Villa (36 y.o. male)  Date: 12/9/2020  Primary Diagnosis: Sepsis ([facility] [address] [A41.9]  CHF (congestive heart failure) ([facility]) [I50.9]  COPD (chronic obstructive pulmonary disease) ([facility]) [J44.9]  CKD (chronic kidney disease) [N18.9]  CHF (congestive heart failure) ([facility]) [I50.9]  COPD (chronic obstructive pulmonary disease) ([facility]) [J44.9]  CKD (chronic kidney disease) [N18.9]        Precautions: R Transfemoral Amputation      ASSESSMENT :  Pt is alert and follows simple commands. He performed all bed mobility with mod (I) and does not require support for static sitting on the EOB. He exhibited weakness in all extremities during the MMT and has slightly reduced bilateral shoulder AROM. Pt is non-ambulatory at this time and requires assistance for brief changing. Pt will not benefit from skilled therapy due to his functional independence with bed mobility and inability to ambulate. PLAN :  Recommendations and Planned Interventions:   No formal PT needs identified at this time. Discharge Recommendations: Home Health  Further Equipment Recommendations for Discharge: None     SUBJECTIVE:   Patient stated Vear Grand I can move in the bed.     OBJECTIVE DATA SUMMARY:     Past Medical History:   Diagnosis Date    Atrial fibrillation ([facility] [address]     Hypertension     MI (myocardial infarction) ([facility] [address] [facility]     PVD (peripheral vascular disease) ([facility] [address]      Past Surgical History:   Procedure Laterality Date    HX ABOVE KNEE AMPUTATION Right  HX ABOVE KNEE AMPUTATION Right     HX HEART CATHETERIZATION       Barriers to Learning/Limitations: None  Compensate with: N/A  Home Situation:   Home Situation  Home Environment: Trailer/mobile home  # Steps to Enter: 4  One/Two Story Residence: One story  Living Alone: No  Support Systems: Child(pascual)  Patient Expects to be Discharged to[de-identified] Private residence  Current DME Used/Available at Home: Wheelchair  Strength: Generally decreased, fucntional  RUE: 3+/5  LUE: 3+/5  RLE (residual limb): 3+/5  LLE: 4-/5  Tone & Sensation:    Intact   Range Of Motion: Generally decreased, functional   RUE: Reduced shoulder AROM  LUE: Reduced shoulder AROM  RLE (residual limb): Slightly reduced hip extension  LLE: WFL      Today's TX:   Pt performed supine to sit with mod (I) but did not attempt to stand. Pain:  No pain reported   Activity Tolerance:   Pt tolerated bed mobility w/o complaints. After treatment:   []         Patient left in no apparent distress sitting up in chair  [x]         Patient left in no apparent distress in bed  [x]         Call bell left within reach  []         Nursing notified  []         Caregiver present  []         Bed alarm activated  []         SCDs applied    COMMUNICATION/EDUCATION:   []         Role of Physical Therapy in the acute care setting. []         Fall prevention education was provided and the patient/caregiver indicated understanding. []         Patient/family have participated as able in goal setting and plan of care. [x]         Patient/family agree to work toward stated goals and plan of care. []         Patient understands intent and goals of therapy, but is neutral about his/her participation. []         Patient is unable to participate in goal setting/plan of care: ongoing with therapy staff.  []         Other:     Thank you for this referral.  Kendell Herrera, SPT  Chey Hooper, PT, DPT

## 2020-12-10 LAB
BACTERIA SPEC CULT: NORMAL
SPECIAL REQUESTS,SREQ: NORMAL

## 2020-12-10 NOTE — PROGRESS NOTES
Physician Progress Note      PATIENT:               Pedro Luis Naqvi  CSN #:                  [id number]  :                       1955  ADMIT DATE:       2020 5:13 PM  [address] [place] DATE:        2020 6:04 PM  RESPONDING  PROVIDER #:        Otis Garzon MD          QUERY TEXT:    Dear hospitalist,    Previous query was  noted to have answer of Left Heel, Left pretibial area decubitus ulcer. If possible, please document in progress notes and discharge summary the stage of the left heel decubitus ulcer: The medical record reflects the following:    Risk Factors: diabetes, PVD    Clinical Indicators:  - per nursing: diabetic ulcer to L heel with black eschar  - per ED note: Moderate-sized pressure sore to the left shin, that is well-healing ; Large pressure sore to the dorsum of the left foot with small amount of exudate. No evidence of infection ; Large painless, necrotic area to the left heel. Treatment: receiving wound care per nursing    Please call me for any questions,  Thank you,  Hayden Briones RN, [facility]  [phone]  Options provided:  -- Stage 2 Decubitus Pressure Ulcer of left heel  -- Stage 3 Decubitus Pressure Ulcer of left heel  -- Stage 4 Decubitus Pressure Ulcer of left heel  -- Deep tissue injury pressure ulcer of left heel  -- Unstageable Decubitus Pressure Ulcer left heel  -- Other - I will add my own diagnosis  -- Disagree - Not applicable / Not valid  -- Disagree - Clinically unable to determine / Unknown  -- Refer to Clinical Documentation Reviewer    PROVIDER RESPONSE TEXT:    This patient has a Stage 2 decubitus pressure ulcer of the left heel. Query created by: Cassia Cortes on 2020 8:33 AM      QUERY TEXT:    Dear hospitalist,    Previous query was  noted to have answer of Left Heel, Left pretibial area decubitus ulcer. Noted to have a anterior left foot diabetic ulcer per nursing.  If possible, please document in progress notes and discharge summary the stage of the left foot ulcer: The medical record reflects the following:    Risk Factors: diabetes, PVD    Clinical Indicators:  - per nursing: diabetic ulcer to L heel with black eschar ; diabetic ulcer to L foot open with muscle and some tendons showing ; pretibial scab  - per ED note: Moderate-sized pressure sore to the left shin, that is well-healing ; Large pressure sore to the dorsum of the left foot with small amount of exudate. No evidence of infection ; Large painless, necrotic area to the left heel. Treatment: receiving wound care per nursing    Please call me for any questions,  Thank you,  Darryl Meyer RN, [facility]  [phone]  Options provided:  -- Stage 4 Decubitus Pressure Ulcer of anterior left foot  -- Unstageable Decubitus Pressure Ulcer anterior left foot  -- Diabetic Ulcer to anterior left foot present on admission  -- Other - I will add my own diagnosis  -- Disagree - Not applicable / Not valid  -- Disagree - Clinically unable to determine / Unknown  -- Refer to Clinical Documentation Reviewer    PROVIDER RESPONSE TEXT:    This patient has a Unstageable Decubitus Pressure ulcer of the anterior left foot . Query created by: Silverio Stephen on 12/8/2020 8:39 AM      QUERY TEXT:    Dear hospitalist,    Previous query was noted to have answer of Left Heel, Left pretibial area decubitus ulcer. If possible, please document in progress notes and discharge summary the stage of the left pretibial decubitus ulcer: The medical record reflects the following:    Risk Factors: diabetes, PVD    Clinical Indicators:  - per nursing: diabetic ulcer to L pretibial scabbed over  - per ED note: Moderate-sized pressure sore to the left shin, that is well-healing ; Large pressure sore to the dorsum of the left foot with small amount of exudate. No evidence of infection ; Large painless, necrotic area to the left heel.     Treatment: receiving wound care per nursing    Please call me for any questions,  Thank you,  Darryl Meyer RN, [facility]  [phone]  Options provided:  -- Stage 1 Decubitus Pressure Ulcer of left pretibial  -- Stage 2 Decubitus Pressure Ulcer of left heel  -- Stage 3 Decubitus Pressure Ulcer of left heel  -- Stage 4 Decubitus Pressure Ulcer of left heel  -- Deep tissue injury pressure ulcer of left heel  -- Unstageable Decubitus Pressure Ulcer left heel  -- Other - I will add my own diagnosis  -- Disagree - Not applicable / Not valid  -- Disagree - Clinically unable to determine / Unknown  -- Refer to Clinical Documentation Reviewer    PROVIDER RESPONSE TEXT:    This patient has a Stage 2 decubitus pressure ulcer of the left heel.     Query created by: Ailyn Martinez on 12/8/2020 8:44 AM      Electronically signed by:  Monica Valdovinos MD [employer] MD 12/10/2020 4:40 PM

## 2021-01-20 ENCOUNTER — HOSPITAL ENCOUNTER (EMERGENCY)
Age: 66
Discharge: ANOTHER HOSPITAL | End: 2021-01-21
Attending: INTERNAL MEDICINE
Payer: OTHER GOVERNMENT

## 2021-01-20 ENCOUNTER — APPOINTMENT (OUTPATIENT)
Dept: GENERAL RADIOLOGY | Age: 66
End: 2021-01-20
Attending: INTERNAL MEDICINE
Payer: OTHER GOVERNMENT

## 2021-01-20 DIAGNOSIS — K92.1 MELANOTIC STOOLS: Primary | ICD-10-CM

## 2021-01-20 DIAGNOSIS — K62.5 RECTAL BLEEDING: ICD-10-CM

## 2021-01-20 DIAGNOSIS — D64.9 ANEMIA, UNSPECIFIED TYPE: ICD-10-CM

## 2021-01-20 LAB
ANION GAP SERPL CALC-SCNC: 7 MMOL/L
BASOPHILS # BLD: 0 K/UL (ref 0–0.1)
BASOPHILS NFR BLD: 0 % (ref 0–2)
BUN SERPL-MCNC: 10 MG/DL (ref 9–21)
BUN/CREAT SERPL: 20
CA-I BLD-MCNC: 9.1 MG/DL (ref 8.5–10.5)
CHLORIDE SERPL-SCNC: 104 MMOL/L (ref 94–111)
CO2 SERPL-SCNC: 28 MMOL/L (ref 21–33)
COLLECT DATE STL: NORMAL
CREAT SERPL-MCNC: 0.5 MG/DL (ref 0.8–1.5)
DIFFERENTIAL METHOD BLD: ABNORMAL
EOSINOPHIL # BLD: 0.2 K/UL (ref 0–0.4)
EOSINOPHIL NFR BLD: 2 % (ref 0–5)
ERYTHROCYTE [DISTWIDTH] IN BLOOD BY AUTOMATED COUNT: 20.2 % (ref 11.6–14.5)
GLUCOSE SERPL-MCNC: 94 MG/DL (ref 70–110)
HCT VFR BLD AUTO: 23.5 % (ref 36–48)
HEMOCCULT SP1 STL QL: POSITIVE
HGB BLD-MCNC: 6.7 G/DL (ref 13–16)
IMM GRANULOCYTES # BLD AUTO: 0 K/UL
IMM GRANULOCYTES NFR BLD AUTO: 0 %
INR PPP: 1.2 (ref 0.8–1.2)
LACTATE SERPL-SCNC: 1 MMOL/L (ref 0.5–2)
LYMPHOCYTES # BLD: 2 K/UL (ref 0.9–3.6)
LYMPHOCYTES NFR BLD: 22 % (ref 21–52)
MAGNESIUM SERPL-MCNC: 1.9 MG/DL (ref 1.7–2.8)
MCH RBC QN AUTO: 28.8 PG (ref 24–34)
MCHC RBC AUTO-ENTMCNC: 28.5 G/DL (ref 31–37)
MCV RBC AUTO: 100.9 FL (ref 74–97)
MONOCYTES # BLD: 1.2 K/UL (ref 0.05–1.2)
MONOCYTES NFR BLD: 13 % (ref 3–10)
NEUTS SEG # BLD: 5.7 K/UL (ref 1.8–8)
NEUTS SEG NFR BLD: 63 % (ref 40–73)
PLATELET # BLD AUTO: 612 K/UL (ref 135–420)
PMV BLD AUTO: 9.2 FL (ref 9.2–11.8)
POTASSIUM SERPL-SCNC: 3.9 MMOL/L (ref 3.2–5.1)
PROTHROMBIN TIME: 14.4 SEC (ref 11.5–15.2)
RBC # BLD AUTO: 2.33 M/UL (ref 4.7–5.5)
RBC MORPH BLD: ABNORMAL
SODIUM SERPL-SCNC: 139 MMOL/L (ref 135–145)
WBC # BLD AUTO: 9.1 K/UL (ref 4.6–13.2)

## 2021-01-20 PROCEDURE — C9113 INJ PANTOPRAZOLE SODIUM, VIA: HCPCS | Performed by: INTERNAL MEDICINE

## 2021-01-20 PROCEDURE — 85610 PROTHROMBIN TIME: CPT

## 2021-01-20 PROCEDURE — 86920 COMPATIBILITY TEST SPIN: CPT

## 2021-01-20 PROCEDURE — 80048 BASIC METABOLIC PNL TOTAL CA: CPT

## 2021-01-20 PROCEDURE — 36415 COLL VENOUS BLD VENIPUNCTURE: CPT

## 2021-01-20 PROCEDURE — 96374 THER/PROPH/DIAG INJ IV PUSH: CPT

## 2021-01-20 PROCEDURE — 83735 ASSAY OF MAGNESIUM: CPT

## 2021-01-20 PROCEDURE — 83605 ASSAY OF LACTIC ACID: CPT

## 2021-01-20 PROCEDURE — 86901 BLOOD TYPING SEROLOGIC RH(D): CPT

## 2021-01-20 PROCEDURE — 74011250636 HC RX REV CODE- 250/636: Performed by: INTERNAL MEDICINE

## 2021-01-20 PROCEDURE — 73630 X-RAY EXAM OF FOOT: CPT

## 2021-01-20 PROCEDURE — 82272 OCCULT BLD FECES 1-3 TESTS: CPT

## 2021-01-20 PROCEDURE — 74011250637 HC RX REV CODE- 250/637: Performed by: INTERNAL MEDICINE

## 2021-01-20 PROCEDURE — 85651 RBC SED RATE NONAUTOMATED: CPT

## 2021-01-20 PROCEDURE — 99285 EMERGENCY DEPT VISIT HI MDM: CPT

## 2021-01-20 PROCEDURE — 85025 COMPLETE CBC W/AUTO DIFF WBC: CPT

## 2021-01-20 RX ORDER — SODIUM CHLORIDE 9 MG/ML
250 INJECTION, SOLUTION INTRAVENOUS AS NEEDED
Status: DISCONTINUED | OUTPATIENT
Start: 2021-01-20 | End: 2021-01-21 | Stop reason: HOSPADM

## 2021-01-20 RX ORDER — POLYETHYLENE GLYCOL 3350 17 G/17G
17 POWDER, FOR SOLUTION ORAL
COMMUNITY

## 2021-01-20 RX ORDER — HYDROCODONE BITARTRATE AND ACETAMINOPHEN 5; 325 MG/1; MG/1
2 TABLET ORAL
Status: COMPLETED | OUTPATIENT
Start: 2021-01-20 | End: 2021-01-20

## 2021-01-20 RX ORDER — OXYCODONE AND ACETAMINOPHEN 5; 325 MG/1; MG/1
1 TABLET ORAL
Status: ON HOLD | COMMUNITY
End: 2021-02-02 | Stop reason: SDUPTHER

## 2021-01-20 RX ADMIN — PANTOPRAZOLE SODIUM 40 MG: 40 INJECTION, POWDER, FOR SOLUTION INTRAVENOUS at 21:57

## 2021-01-20 RX ADMIN — HYDROCODONE BITARTRATE AND ACETAMINOPHEN 2 TABLET: 5; 325 TABLET ORAL at 21:58

## 2021-01-21 ENCOUNTER — HOSPITAL ENCOUNTER (INPATIENT)
Age: 66
LOS: 12 days | Discharge: NURSING, REHAB OR LONG-TERM CARE | DRG: 474 | End: 2021-02-02
Attending: EMERGENCY MEDICINE | Admitting: INTERNAL MEDICINE
Payer: MEDICARE

## 2021-01-21 ENCOUNTER — ANESTHESIA EVENT (OUTPATIENT)
Dept: ENDOSCOPY | Age: 66
DRG: 474 | End: 2021-01-21
Payer: MEDICARE

## 2021-01-21 ENCOUNTER — APPOINTMENT (OUTPATIENT)
Dept: GENERAL RADIOLOGY | Age: 66
DRG: 474 | End: 2021-01-21
Attending: HOSPITALIST
Payer: MEDICARE

## 2021-01-21 ENCOUNTER — ANESTHESIA (OUTPATIENT)
Dept: ENDOSCOPY | Age: 66
DRG: 474 | End: 2021-01-21
Payer: MEDICARE

## 2021-01-21 ENCOUNTER — APPOINTMENT (OUTPATIENT)
Dept: GENERAL RADIOLOGY | Age: 66
DRG: 474 | End: 2021-01-21
Attending: INTERNAL MEDICINE
Payer: MEDICARE

## 2021-01-21 VITALS
HEART RATE: 110 BPM | TEMPERATURE: 98.9 F | HEIGHT: 67 IN | WEIGHT: 86.3 LBS | SYSTOLIC BLOOD PRESSURE: 171 MMHG | DIASTOLIC BLOOD PRESSURE: 97 MMHG | RESPIRATION RATE: 17 BRPM | BODY MASS INDEX: 13.54 KG/M2 | OXYGEN SATURATION: 100 %

## 2021-01-21 DIAGNOSIS — R19.5 HEME POSITIVE STOOL: ICD-10-CM

## 2021-01-21 DIAGNOSIS — Z89.612 S/P AKA (ABOVE KNEE AMPUTATION), LEFT (HCC): ICD-10-CM

## 2021-01-21 DIAGNOSIS — D50.8 OTHER IRON DEFICIENCY ANEMIA: Primary | ICD-10-CM

## 2021-01-21 PROBLEM — K92.2 ACUTE UPPER GI BLEED: Status: ACTIVE | Noted: 2021-01-21

## 2021-01-21 LAB
ALBUMIN SERPL-MCNC: 2.3 G/DL (ref 3.5–5)
ALBUMIN/GLOB SERPL: 0.5 {RATIO} (ref 1.1–2.2)
ALP SERPL-CCNC: 121 U/L (ref 45–117)
ALT SERPL-CCNC: 8 U/L (ref 12–78)
ANION GAP SERPL CALC-SCNC: 3 MMOL/L (ref 5–15)
AST SERPL-CCNC: 17 U/L (ref 15–37)
BASOPHILS # BLD: 0 K/UL (ref 0–0.1)
BASOPHILS NFR BLD: 0 % (ref 0–1)
BILIRUB SERPL-MCNC: 0.5 MG/DL (ref 0.2–1)
BUN SERPL-MCNC: 9 MG/DL (ref 6–20)
BUN/CREAT SERPL: 19 (ref 12–20)
CALCIUM SERPL-MCNC: 9.1 MG/DL (ref 8.5–10.1)
CHLORIDE SERPL-SCNC: 107 MMOL/L (ref 97–108)
CO2 SERPL-SCNC: 30 MMOL/L (ref 21–32)
COVID-19 RAPID TEST, COVR: NOT DETECTED
CREAT SERPL-MCNC: 0.48 MG/DL (ref 0.7–1.3)
CRP SERPL-MCNC: 3.42 MG/DL (ref 0–0.6)
DIFFERENTIAL METHOD BLD: ABNORMAL
EOSINOPHIL # BLD: 0.2 K/UL (ref 0–0.4)
EOSINOPHIL NFR BLD: 2 % (ref 0–7)
ERYTHROCYTE [DISTWIDTH] IN BLOOD BY AUTOMATED COUNT: 21.9 % (ref 11.5–14.5)
ERYTHROCYTE [SEDIMENTATION RATE] IN BLOOD: 100 MM/HR
FERRITIN SERPL-MCNC: 275 NG/ML (ref 26–388)
FOLATE SERPL-MCNC: 10.9 NG/ML (ref 5–21)
GLOBULIN SER CALC-MCNC: 4.9 G/DL (ref 2–4)
GLUCOSE SERPL-MCNC: 90 MG/DL (ref 65–100)
HCT VFR BLD AUTO: 27.1 % (ref 36.6–50.3)
HGB BLD-MCNC: 8.4 G/DL (ref 12.1–17)
IMM GRANULOCYTES # BLD AUTO: 0.1 K/UL (ref 0–0.04)
IMM GRANULOCYTES NFR BLD AUTO: 1 % (ref 0–0.5)
IRON SATN MFR SERPL: 19 % (ref 20–50)
IRON SERPL-MCNC: 42 UG/DL (ref 35–150)
LIPASE SERPL-CCNC: 87 U/L (ref 73–393)
LYMPHOCYTES # BLD: 1.5 K/UL (ref 0.8–3.5)
LYMPHOCYTES NFR BLD: 18 % (ref 12–49)
MAGNESIUM SERPL-MCNC: 1.8 MG/DL (ref 1.6–2.4)
MCH RBC QN AUTO: 28.3 PG (ref 26–34)
MCHC RBC AUTO-ENTMCNC: 31 G/DL (ref 30–36.5)
MCV RBC AUTO: 91.2 FL (ref 80–99)
MONOCYTES # BLD: 1.1 K/UL (ref 0–1)
MONOCYTES NFR BLD: 13 % (ref 5–13)
NEUTS SEG # BLD: 5.6 K/UL (ref 1.8–8)
NEUTS SEG NFR BLD: 66 % (ref 32–75)
NRBC # BLD: 0 K/UL (ref 0–0.01)
NRBC BLD-RTO: 0 PER 100 WBC
PHOSPHATE SERPL-MCNC: 2.8 MG/DL (ref 2.6–4.7)
PLATELET # BLD AUTO: 480 K/UL (ref 150–400)
PMV BLD AUTO: 9 FL (ref 8.9–12.9)
POTASSIUM SERPL-SCNC: 3.2 MMOL/L (ref 3.5–5.1)
PROT SERPL-MCNC: 7.2 G/DL (ref 6.4–8.2)
RBC # BLD AUTO: 2.97 M/UL (ref 4.1–5.7)
RBC MORPH BLD: ABNORMAL
SARS-COV-2, COV2: NORMAL
SODIUM SERPL-SCNC: 140 MMOL/L (ref 136–145)
SOURCE, COVRS: NORMAL
TIBC SERPL-MCNC: 220 UG/DL (ref 250–450)
TROPONIN I SERPL-MCNC: 0.11 NG/ML
TSH SERPL DL<=0.05 MIU/L-ACNC: 0.92 UIU/ML (ref 0.36–3.74)
VIT B12 SERPL-MCNC: 629 PG/ML (ref 193–986)
WBC # BLD AUTO: 8.5 K/UL (ref 4.1–11.1)

## 2021-01-21 PROCEDURE — 76040000019: Performed by: SPECIALIST

## 2021-01-21 PROCEDURE — 87635 SARS-COV-2 COVID-19 AMP PRB: CPT

## 2021-01-21 PROCEDURE — 82746 ASSAY OF FOLIC ACID SERUM: CPT

## 2021-01-21 PROCEDURE — 36415 COLL VENOUS BLD VENIPUNCTURE: CPT

## 2021-01-21 PROCEDURE — C9113 INJ PANTOPRAZOLE SODIUM, VIA: HCPCS | Performed by: INTERNAL MEDICINE

## 2021-01-21 PROCEDURE — 74011250636 HC RX REV CODE- 250/636: Performed by: INTERNAL MEDICINE

## 2021-01-21 PROCEDURE — 0DJ08ZZ INSPECTION OF UPPER INTESTINAL TRACT, VIA NATURAL OR ARTIFICIAL OPENING ENDOSCOPIC: ICD-10-PCS | Performed by: SPECIALIST

## 2021-01-21 PROCEDURE — 76060000031 HC ANESTHESIA FIRST 0.5 HR: Performed by: SPECIALIST

## 2021-01-21 PROCEDURE — 36430 TRANSFUSION BLD/BLD COMPNT: CPT

## 2021-01-21 PROCEDURE — 84100 ASSAY OF PHOSPHORUS: CPT

## 2021-01-21 PROCEDURE — P9016 RBC LEUKOCYTES REDUCED: HCPCS

## 2021-01-21 PROCEDURE — 71045 X-RAY EXAM CHEST 1 VIEW: CPT

## 2021-01-21 PROCEDURE — 80053 COMPREHEN METABOLIC PANEL: CPT

## 2021-01-21 PROCEDURE — 83540 ASSAY OF IRON: CPT

## 2021-01-21 PROCEDURE — 83735 ASSAY OF MAGNESIUM: CPT

## 2021-01-21 PROCEDURE — 82607 VITAMIN B-12: CPT

## 2021-01-21 PROCEDURE — 2709999900 HC NON-CHARGEABLE SUPPLY: Performed by: SPECIALIST

## 2021-01-21 PROCEDURE — 84484 ASSAY OF TROPONIN QUANT: CPT

## 2021-01-21 PROCEDURE — 74011000250 HC RX REV CODE- 250: Performed by: INTERNAL MEDICINE

## 2021-01-21 PROCEDURE — 99285 EMERGENCY DEPT VISIT HI MDM: CPT

## 2021-01-21 PROCEDURE — 74011000250 HC RX REV CODE- 250: Performed by: NURSE ANESTHETIST, CERTIFIED REGISTERED

## 2021-01-21 PROCEDURE — 83690 ASSAY OF LIPASE: CPT

## 2021-01-21 PROCEDURE — 82728 ASSAY OF FERRITIN: CPT

## 2021-01-21 PROCEDURE — 86140 C-REACTIVE PROTEIN: CPT

## 2021-01-21 PROCEDURE — 74011250636 HC RX REV CODE- 250/636: Performed by: NURSE ANESTHETIST, CERTIFIED REGISTERED

## 2021-01-21 PROCEDURE — 85025 COMPLETE CBC W/AUTO DIFF WBC: CPT

## 2021-01-21 PROCEDURE — 74011250637 HC RX REV CODE- 250/637: Performed by: INTERNAL MEDICINE

## 2021-01-21 PROCEDURE — 65660000000 HC RM CCU STEPDOWN

## 2021-01-21 PROCEDURE — 74011000258 HC RX REV CODE- 258: Performed by: INTERNAL MEDICINE

## 2021-01-21 PROCEDURE — 84443 ASSAY THYROID STIM HORMONE: CPT

## 2021-01-21 RX ORDER — CARVEDILOL 6.25 MG/1
6.25 TABLET ORAL 2 TIMES DAILY WITH MEALS
Status: DISCONTINUED | OUTPATIENT
Start: 2021-01-21 | End: 2021-02-02 | Stop reason: HOSPADM

## 2021-01-21 RX ORDER — LANOLIN ALCOHOL/MO/W.PET/CERES
325 CREAM (GRAM) TOPICAL
Status: DISCONTINUED | OUTPATIENT
Start: 2021-01-21 | End: 2021-02-02 | Stop reason: HOSPADM

## 2021-01-21 RX ORDER — LIDOCAINE HYDROCHLORIDE 20 MG/ML
INJECTION, SOLUTION EPIDURAL; INFILTRATION; INTRACAUDAL; PERINEURAL AS NEEDED
Status: DISCONTINUED | OUTPATIENT
Start: 2021-01-21 | End: 2021-01-21 | Stop reason: HOSPADM

## 2021-01-21 RX ORDER — ATORVASTATIN CALCIUM 40 MG/1
40 TABLET, FILM COATED ORAL DAILY
Status: DISCONTINUED | OUTPATIENT
Start: 2021-01-21 | End: 2021-02-02 | Stop reason: HOSPADM

## 2021-01-21 RX ORDER — MIRTAZAPINE 15 MG/1
7.5 TABLET, FILM COATED ORAL
Status: DISCONTINUED | OUTPATIENT
Start: 2021-01-21 | End: 2021-01-21

## 2021-01-21 RX ORDER — ACETAMINOPHEN 650 MG/1
650 SUPPOSITORY RECTAL
Status: DISCONTINUED | OUTPATIENT
Start: 2021-01-21 | End: 2021-02-02 | Stop reason: HOSPADM

## 2021-01-21 RX ORDER — ONDANSETRON 2 MG/ML
4 INJECTION INTRAMUSCULAR; INTRAVENOUS
Status: DISCONTINUED | OUTPATIENT
Start: 2021-01-21 | End: 2021-02-02 | Stop reason: HOSPADM

## 2021-01-21 RX ORDER — SODIUM CHLORIDE 0.9 % (FLUSH) 0.9 %
5-40 SYRINGE (ML) INJECTION AS NEEDED
Status: DISCONTINUED | OUTPATIENT
Start: 2021-01-21 | End: 2021-02-02 | Stop reason: HOSPADM

## 2021-01-21 RX ORDER — OXYCODONE AND ACETAMINOPHEN 5; 325 MG/1; MG/1
1 TABLET ORAL
Status: DISCONTINUED | OUTPATIENT
Start: 2021-01-21 | End: 2021-02-02 | Stop reason: HOSPADM

## 2021-01-21 RX ORDER — HYDRALAZINE HYDROCHLORIDE 25 MG/1
25 TABLET, FILM COATED ORAL 3 TIMES DAILY
Status: DISCONTINUED | OUTPATIENT
Start: 2021-01-21 | End: 2021-02-02 | Stop reason: HOSPADM

## 2021-01-21 RX ORDER — SODIUM CHLORIDE 0.9 % (FLUSH) 0.9 %
5-40 SYRINGE (ML) INJECTION EVERY 8 HOURS
Status: DISCONTINUED | OUTPATIENT
Start: 2021-01-21 | End: 2021-02-02 | Stop reason: HOSPADM

## 2021-01-21 RX ORDER — LISINOPRIL 10 MG/1
5 TABLET ORAL DAILY
Status: DISCONTINUED | OUTPATIENT
Start: 2021-01-21 | End: 2021-01-22

## 2021-01-21 RX ORDER — PROPOFOL 10 MG/ML
INJECTION, EMULSION INTRAVENOUS AS NEEDED
Status: DISCONTINUED | OUTPATIENT
Start: 2021-01-21 | End: 2021-01-21 | Stop reason: HOSPADM

## 2021-01-21 RX ORDER — POLYETHYLENE GLYCOL 3350 17 G/17G
17 POWDER, FOR SOLUTION ORAL DAILY
Status: DISCONTINUED | OUTPATIENT
Start: 2021-01-21 | End: 2021-02-02 | Stop reason: HOSPADM

## 2021-01-21 RX ORDER — SODIUM CHLORIDE 9 MG/ML
INJECTION, SOLUTION INTRAVENOUS
Status: DISCONTINUED | OUTPATIENT
Start: 2021-01-21 | End: 2021-01-21 | Stop reason: HOSPADM

## 2021-01-21 RX ORDER — ACETAMINOPHEN 325 MG/1
650 TABLET ORAL
Status: DISCONTINUED | OUTPATIENT
Start: 2021-01-21 | End: 2021-02-02 | Stop reason: HOSPADM

## 2021-01-21 RX ORDER — CLOPIDOGREL BISULFATE 75 MG/1
75 TABLET ORAL DAILY
COMMUNITY
End: 2021-02-02

## 2021-01-21 RX ORDER — PROMETHAZINE HYDROCHLORIDE 25 MG/1
12.5 TABLET ORAL
Status: DISCONTINUED | OUTPATIENT
Start: 2021-01-21 | End: 2021-02-02 | Stop reason: HOSPADM

## 2021-01-21 RX ORDER — THERA TABS 400 MCG
1 TAB ORAL DAILY
COMMUNITY
End: 2021-02-02

## 2021-01-21 RX ORDER — LORATADINE 10 MG/1
10 TABLET ORAL DAILY
Status: DISCONTINUED | OUTPATIENT
Start: 2021-01-21 | End: 2021-02-02 | Stop reason: HOSPADM

## 2021-01-21 RX ORDER — SENNOSIDES 8.6 MG/1
1 TABLET ORAL DAILY
Status: DISCONTINUED | OUTPATIENT
Start: 2021-01-21 | End: 2021-02-02 | Stop reason: HOSPADM

## 2021-01-21 RX ADMIN — SODIUM CHLORIDE: 900 INJECTION, SOLUTION INTRAVENOUS at 17:06

## 2021-01-21 RX ADMIN — HYDRALAZINE HYDROCHLORIDE 25 MG: 50 TABLET, FILM COATED ORAL at 09:24

## 2021-01-21 RX ADMIN — Medication 10 ML: at 21:14

## 2021-01-21 RX ADMIN — LISINOPRIL 5 MG: 10 TABLET ORAL at 19:41

## 2021-01-21 RX ADMIN — VANCOMYCIN HYDROCHLORIDE 500 MG: 500 INJECTION, POWDER, LYOPHILIZED, FOR SOLUTION INTRAVENOUS at 22:25

## 2021-01-21 RX ADMIN — FERROUS SULFATE TAB 325 MG (65 MG ELEMENTAL FE) 325 MG: 325 (65 FE) TAB at 09:24

## 2021-01-21 RX ADMIN — PROPOFOL 10 MG: 10 INJECTION, EMULSION INTRAVENOUS at 17:20

## 2021-01-21 RX ADMIN — Medication 10 ML: at 13:22

## 2021-01-21 RX ADMIN — Medication 10 ML: at 21:11

## 2021-01-21 RX ADMIN — POLYETHYLENE GLYCOL 3350 17 G: 17 POWDER, FOR SOLUTION ORAL at 09:29

## 2021-01-21 RX ADMIN — CEFEPIME 2 G: 2 INJECTION, POWDER, FOR SOLUTION INTRAVENOUS at 09:27

## 2021-01-21 RX ADMIN — DOXYCYCLINE 100 MG: 100 INJECTION, POWDER, LYOPHILIZED, FOR SOLUTION INTRAVENOUS at 21:09

## 2021-01-21 RX ADMIN — SODIUM CHLORIDE 40 MG: 9 INJECTION INTRAMUSCULAR; INTRAVENOUS; SUBCUTANEOUS at 09:26

## 2021-01-21 RX ADMIN — Medication 1 CAPSULE: at 09:24

## 2021-01-21 RX ADMIN — PROPOFOL 20 MG: 10 INJECTION, EMULSION INTRAVENOUS at 17:18

## 2021-01-21 RX ADMIN — VANCOMYCIN HYDROCHLORIDE 1000 MG: 1 INJECTION, POWDER, LYOPHILIZED, FOR SOLUTION INTRAVENOUS at 09:28

## 2021-01-21 RX ADMIN — HYDRALAZINE HYDROCHLORIDE 25 MG: 50 TABLET, FILM COATED ORAL at 19:41

## 2021-01-21 RX ADMIN — PROPOFOL 50 MG: 10 INJECTION, EMULSION INTRAVENOUS at 17:14

## 2021-01-21 RX ADMIN — LIDOCAINE HYDROCHLORIDE 40 MG: 20 INJECTION, SOLUTION EPIDURAL; INFILTRATION; INTRACAUDAL; PERINEURAL at 17:14

## 2021-01-21 RX ADMIN — Medication 8.6 MG: at 09:29

## 2021-01-21 RX ADMIN — PROPOFOL 20 MG: 10 INJECTION, EMULSION INTRAVENOUS at 17:16

## 2021-01-21 RX ADMIN — SODIUM CHLORIDE 40 MG: 9 INJECTION INTRAMUSCULAR; INTRAVENOUS; SUBCUTANEOUS at 21:09

## 2021-01-21 RX ADMIN — CARVEDILOL 6.25 MG: 6.25 TABLET, FILM COATED ORAL at 19:40

## 2021-01-21 NOTE — PROGRESS NOTES
INTERIM UPDATE - 0512 EST on 1/20/2021    Spoke with [facility] ER Physician regarding need for Transfer for Patient, as Karen Mom ostensibly lacks Gastroenterological coverage and Patient has Symptomatic Anemia with GI Bleed, likely Upper GIB given mildly melenic stool and Hgb 6.7 (previously Hemoglobin 8-9 two months ago). [facility] ER Physician states that Patient has a possibly infected chronic left foot ulcer that may need [facility]bridement [facility] stating that Nursing Staff would likely be able to manage 'slough' on this wound without the physical presence of a Wound Care service). [facility] ER Physician states that Patient would not be admitted for this problem at this time and is currently on long-term IV antibiotics for treatment of this issue. 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 Ultimately, Chart Review and discussion with other Hospitalist Staff regarding these findings and recent related medical issues cited above has eroded confidence that [facility] is the appropriate facility for this Patient---even though GI Bleed is the primary, active diagnosis. Plan:  [employer] and declined Transfer of this Patient in light of these findings and lack of ancillary services that would otherwise facilitate care for this Patient at [facility].

## 2021-01-21 NOTE — PROCEDURES
[address]  Joan Mendez, [address]                 NAME:  Luis Found   :   1955   MRN:   [id number]     Date/Time:  2021 5:51 PM    Esophagogastroduodenoscopy (EGD) Procedure Note    :  Jp Bolivar MD    Staff: Endoscopy Technician-1: Jill Ledesma  Endoscopy RN-1: Tamera Barrera RN     Referring Provider:  Erika Ragsdale MD    Anethesia/Sedation:  MAC anesthesia Propofol    Preoperative diagnosis: Anemia    Postoperative diagnosis: 1. Non Bleeding Duodenal AVMs    Procedure Details     After infom consent was obtained for the procedure, with all risks and benefits of procedure explained the patient was taken to the endoscopy suite and placed in the left lateral decubitus position. Following sequential administration of sedation as per above, the [id number] gastroscope was inserted into the mouth and advanced under direct vision to second portion of the duodenum. A careful inspection was made as the gastroscope was withdrawn, including a retroflexed view of the proximal stomach; findings and interventions are described below. Findings:  Esophagus:normal  Stomach:PEG tube seen in body, no other lesions seen  Duodenum/jejunum:Two non bleeding AVMs noted in descending duodenum. Therapies:  none    Specimens: none           EBL: None    Complications:   None; patient tolerated the procedure well. Impression:    See Postoperative diagnosis above    Recommendations:  -Acid suppression with a proton pump inhibitor. , -We will consider colon as inpatient/outpatient once he has been off Plavix for 5-7 days        Jp Bolivar MD

## 2021-01-21 NOTE — WOUND CARE
WOCN Note:     New consult for foot. Chart shows:  Admitted for low Hgb, GIB  History of PAD, right AKA, CABG, CVA, peg  WBC = 8.5  Admitted from nursing home and reports he has not been followed by a podiatrist.  Radha Wilkerson has been consulted on this admission - I called Dr. Ashley Bruno office and made them aware of consult. X-ray of left foot on 1/20/21: \"concerning for osteomyelitis\"    Assessment:   Appropriately conversational and tolerates wound care well. Seen in ER. Cleaned him of urinary incontinence and applied dry brief. On stretcher. Buttocks and sacral skin intact and without erythema. Heel offloaded with pillow. 1. POA, left dorsal foot, stage 4 pressure injury = 10.3 x 4.8 x 0.3 cm  Base is 60% granular red and 40% devitalized tan/yellow with palpable bone. Some black dots of black sponge noted that I could not easily remove  2. POA, left heel stage 4 pressure injury = 6 x 8 x 0.3 cm 75% granular red with 25% scattered yellow. Both ulcers without odor or exudate. Periwound intact & without erythema. Cleaned with wound cleanser, applied wet Opticel AG and dry cover dressing. Incisional scars down left leg consistent with vascular surgery - he could not give further information. Left lateral foot with 3 depressions of scar tissue that has a braun crust.     Left lower anterior leg with dry burgundy crust. No exudate or redness & left open to air. Wound Recommendations:    Podiatry to see. Left heel and foot: maintain current dressing until seen by podiatry up to 2 days or change as needed using Opticel AG and dry cover. PI Prevention:  Turn/reposition approximately every 2 hours  Offload heels with pillow at all times in bed. Sacral Foam dressing: lift to assess regularly; change as needed. Discontinue if incontinent. Discussed with RN, Perla Workman.     Transition of Care: Plan to follow weekly and as needed while admitted to hospital.      ANAI Singletary, RN, [facility]  Certified Wound, Ostomy, Continence Nurse  office [phone]  pager [phone] or call  to page

## 2021-01-21 NOTE — ED NOTES
Pavithra Thomas (daughter and medical POA) [phone] has been updated will call her when pt gets a room upstairs

## 2021-01-21 NOTE — ANESTHESIA PREPROCEDURE EVALUATION
Relevant Problems   No relevant active problems       Anesthetic History   No history of anesthetic complications            Review of Systems / Medical History  Patient summary reviewed, nursing notes reviewed and pertinent labs reviewed    Pulmonary  Within defined limits  COPD               Neuro/Psych   Within defined limits      Psychiatric history     Cardiovascular  Within defined limits  Hypertension      CHF  Dysrhythmias : atrial fibrillation  Past MI, CAD, PAD and CABG    Exercise tolerance: <4 METS     GI/Hepatic/Renal  Within defined limits       Renal disease: CRI       Endo/Other  Within defined limits      Anemia     Other Findings              Physical Exam    Airway  Mallampati: II  TM Distance: > 6 cm  Neck ROM: normal range of motion   Mouth opening: Normal     Cardiovascular  Regular rate and rhythm,  S1 and S2 normal,  no murmur, click, rub, or gallop             Dental  No notable dental hx       Pulmonary  Breath sounds clear to auscultation               Abdominal  GI exam deferred       Other Findings            Anesthetic Plan    ASA: 3  Anesthesia type: MAC          Induction: Intravenous  Anesthetic plan and risks discussed with: Patient

## 2021-01-21 NOTE — ED NOTES
Pt transported to endoscopy now; Spoke with pharmacy and she will call daughter to clarify home medications. Gave report to [facility] in endoscopy.

## 2021-01-21 NOTE — PROGRESS NOTES
Pharmacist Note - Vancomycin Dosing    Consult provided for this 77 y.o. male for indication of SSTI. Antibiotic regimen(s): vancomycin, cefepime, doxycycline  Patient on vancomycin PTA? NO     Recent Labs     21  0706 21  1832   WBC 8.5 9.1   CREA  --  0.50*   BUN  --  10     Frequency of BMP: daily x3  Height: 170 cm  Weight: 39.1 kg  Est CrCl: 56 ml/min; Temp (24hrs), Av.3 °F (36.8 °C), Min:97.6 °F (36.4 °C), Max:98.9 °F (37.2 °C)    Cultures:  none    Goal trough = 10 - 15 mcg/mL    Therapy will be initiated with a loading dose of 1000 mg IV x 1 to be followed by a maintenance dose of 500 mg IV every 12 hours. Pharmacy to follow patient daily and order levels / make dose adjustments as appropriate.

## 2021-01-21 NOTE — ED NOTES
Wound care, Magdiel Atkinson RN here to assess pt foot.  She confirms that she will contact podiatry after she completes her assessment

## 2021-01-21 NOTE — H&P
[address]  HISTORY AND PHYSICAL    Name:  Meghan Loya  MR#:  [id number]  :  1955  ACCOUNT #:  [de-identified]  ADMIT DATE:  2021      The patient was seen, evaluated and admitted by me on 2021. PRIMARY CARE PHYSICIAN:  Lala England MD    SOURCE OF INFORMATION:  The patient, review of ED and old medical records. CHIEF COMPLAINT:  Low hemoglobin. HISTORY OF PRESENT ILLNESS:  This is a 47-year-old man with past medical history significant for hypertension; dyslipidemia; peripheral artery disease, status post right above-knee amputation; coronary artery disease, status post CABG; chronic atrial fibrillation; status post CVA; and status post PEG tube placement, who was in his usual state of health until the day of his presentation at the emergency room when it was reported that the patient's hemoglobin was low at the nursing home where the patient presently resides. Hemoglobin was checked at the facility because of his chronic left foot ulcer. The patient was found to have hemoglobin of 6.2. He was sent to [facility] for further evaluation. According to report, the patient used to have wound VAC in the left foot. The wound VAC was removed because of excessive bleeding. When the patient arrived at the emergency room at [facility], the low hemoglobin was confirmed. Blood transfusion was started. The patient was transferred to [facility] for further evaluation. Not only was the low hemoglobin confirmed at the emergency room at [facility], the patient reported that he was passing dark stool and the stool occult blood was positive, and no GI service [facility].  Blood transfusion was started when the patient arrived at [facility] emergency room. The patient has completed 1 unit of packed red blood cells. He was referred to the hospitalist service for evaluation for admission.   He was recently admitted to [facility].  The patient was admitted and treated for sepsis coming from the left leg wound. Following the treatment, he was discharged to SNF where the patient presently resides. The patient is not a good historian but denies chest pain, fever, rigors, chills. PAST MEDICAL HISTORY:  1. Hypertension. 2.  Dyslipidemia. 3.  Peripheral artery disease, status post right above-knee amputation. 4.  Coronary artery disease, status post CABG. 5.  Chronic atrial fibrillation. 6.  Status post PEG tube placement. ALLERGIES:  NO KNOWN DRUG ALLERGIES. MEDICATIONS:  1. Percocet 5/325 one tablet every 4 hours as needed for pain. 2.  MiraLax 17 g daily. 3.  Senna 8.6 mg 1 tablet daily. 4.  Claritin 10 mg daily. 5.  Hydralazine 25 mg 1 tablet 3 times daily. 6.  Lipitor 40 mg daily. 7.  Lisinopril 5 mg daily. 8.  Coreg 6.25 mg twice daily. 9.  Aspirin 81 mg daily. 10.  Ferrous sulfate 325 mg 1 tablet daily. 11.  Remeron 15 mg half tablet daily at bedtime. 12.  Prilosec 20 mg daily. FAMILY HISTORY:  This was reviewed. His mother had ovarian cancer. His father had stroke. PAST SURGICAL HISTORY:  This is significant for right above-knee amputation. SOCIAL HISTORY:  The patient is a former smoker. No history of alcohol abuse. REVIEW OF SYSTEMS:  HEAD, EYES, EARS, NOSE, AND THROAT:  No headache, no dizziness, no blurring of vision, no photophobia. RESPIRATORY SYSTEM:  No cough, no shortness of breath, no hemoptysis. CARDIOVASCULAR SYSTEM:  No chest pain, no orthopnea, no palpitations. GASTROINTESTINAL SYSTEM:  No nausea or vomiting. No diarrhea, no constipation. GENITOURINARY SYSTEM:  No dysuria, no urgency, no frequency. All other systems are reviewed and they are negative. PHYSICAL EXAMINATION:  GENERAL APPEARANCE:  The patient appeared ill, in moderate distress.   VITAL SIGNS:  On arrival at the emergency room; temperature 97.6, pulse 107, respiratory rate 18, blood pressure 149/87, oxygen saturation 99%. HEENT:  Head:  Normocephalic, atraumatic. Eyes:  Normal eye movement. No redness, no drainage, no discharge. Ears:  Normal external ears with no evidence of drainage. Nose:  No deformity, no drainage. Mouth and Throat:  No visible oral lesion. Dry oral mucosa. NECK:  Neck is supple. No JVD, no thyromegaly. CHEST:  Clear breath sounds. No wheezing, no crackles. HEART:  Normal S1 and S2, regular. No clinically appreciable murmur. ABDOMEN:  Soft, nontender. Normal bowel sounds. CNS:  Alert and oriented x3. Left lower extremity weakness noted. EXTREMITIES:  Right above-knee amputation noted. Reduced pulse in the left lower extremity. MUSCULOSKELETAL SYSTEM:  Right above-knee amputation noted. SKIN:  Stage III ulcer of the left foot with intact dressing noted. PSYCHIATRY:  Normal mood and affect. LYMPHATIC SYSTEM:  No cervical lymphadenopathy. DIAGNOSTIC DATA:  None. LABORATORY DATA:  Lactic acid level 1.0. Coagulation profile:  PT 14.4. Magnesium 1.9. Chemistry:  Sodium 139, potassium 3.9, chloride 104, CO2 of 28, glucose 94, BUN 10, creatinine 0.50, calcium 9.1. Hematology:  WBC 9.1, hemoglobin 6.7, hematocrit 23.5, platelets 863. Stool occult blood positive. ASSESSMENT:  1. Acute upper gastrointestinal bleed. 2.  Acute blood loss anemia. 3.  Hypertension. 4.  Dyslipidemia. 5.  Infected left foot ulcer stage III. 6.  Peripheral artery disease, status post right above-knee amputation. 7.  Coronary artery disease, status post coronary artery bypass grafting. 8.  Chronic atrial fibrillation. 9.  Status post percutaneous endoscopic gastrostomy tube placement. PLAN:  1. Acute upper GI bleed: We will admit the patient for further evaluation and treatment. We will start the patient on Protonix. Gastroenterology consult will be requested to assist in further evaluation and treatment.   We will hold antiplatelet medication until when the patient is cleared by the gastroenterologist.  2.  Acute blood loss anemia: This is most likely as a result of acute upper GI bleed. The patient already received 1 unit of packed red blood cell. We will repeat hemoglobin and hematocrit and transfuse if indicated. We will also carry out anemia workup. 3.  Hypertension: We will resume preadmission medication and monitor the patient's blood pressure closely. 4.  Dyslipidemia: We will continue with preadmission medication. 5.  Infected left foot ulcer stage III:  The patient was admitted to [facility] for this and the patient is presently receiving intravenous antibiotics through the PICC line. It was reported that the patient's wound culture grew Pseudomonas. We will start the patient on vancomycin and Zosyn. Podiatry consult will be requested to assist in further evaluation and treatment. Wound Care consult will also be requested. 6.  Peripheral artery disease, status post right above-knee amputation:  Antiplatelet therapy medication is on hold until when the patient is cleared by the gastroenterologist.  7.  Coronary artery disease, status post CABG:  We will check cardiac markers to rule out acute myocardial infarction. We will continue with cardiac medication except for antiplatelet medication because of the GI bleed. 8.  Chronic atrial fibrillation: We will obtain an EKG. The patient is not on any anticoagulation at present. During the last hospitalization to [facility], the patient was found to have positive stool guaiac, because of that anticoagulation was held and the patient was advised to see gastroenterology as an outpatient when he was discharged from [facility].  9.  Status post PEG tube placement:  The patient has no difficulties in swallowing. The PEG tube was placed so that the patient can receive supplemental feeding. OTHER ISSUES:  Code status: The patient is a full code.   The patient is not a candidate for SCD because of his severe peripheral vascular disease. FUNCTIONAL STATUS PRIOR TO ADMISSION:  The patient is wheelchair-bound and the patient is coming from nursing home. COVID PRECAUTION:  The patient was wearing a face mask. I was wearing a face mask and gloves for this patient's encounter. Since the patient is coming from nursing home, the patient will be screened for COVID-19 virus infection. Patricia Osuna MD      [id number]  D:  01/21/2021 6:50  T:  01/21/2021 8:31  JOB #:  [id number]  CC:   Rolando Crystal MD

## 2021-01-21 NOTE — ED NOTES
This nurse was called to lab to  blood, delay with issuing of blood due to computer issues in blood bank, blood finally released as emergent. Charge nurse and RN Supervisor aware.

## 2021-01-21 NOTE — PROGRESS NOTES
Admission Medication Reconciliation:      Spoke with daughter Mariposa Hernandez by telephone @ [phone], unable to speak with patient face to face at this time due to general isolation precautions in the ED related to COVID-19 pandemic. Patient is a reliable historian. RX query is available at this time, as are documents from [facility]. Note that the facility documents appear incomplete (missing some medication information). Interview included questions regarding use of:   PTA medications including prescription/OTC, vitamins, supplements, inhaled, topical, injectable, otic and ophthalmic medications    Attempting to contact  Dr. Arthur Miller / Dr. Osmel Obrien via [facility] text to update regarding completion changes to PTA med list.    Notes:   Mirtazapine: daughter states this was stopped by Dr. Ifrah Naidu, also not noted as recent refill on RX Query, also not on med list which was viewable in chart    Recommendations:   Mirtazapine: discontinue    Medication changes (since last review): Added:   MVT    Revised:   Miralax to every other day    Deleted:   Mirtazapine      Thank you for allowing me to participate in the care of your patient. Darell Wallis PharmD, RN # [phone]       [facility] pharmacy benefit data reflects medications filled and processed through the patient's insurance, however   this data does NOT capture whether the medication was picked up or is currently being taken by the patient. Allergies:  Patient has no known allergies. Significant PMH/Disease States:   Past Medical History:   Diagnosis Date    [facility]rial fibrillation ([facility] [address]     Hypertension     MI (myocardial infarction) ([facility] [address]     PVD (peripheral vascular disease) ([facility] [address]      Chief Complaint for this Admission:    Chief Complaint   Patient presents with    Transfer Of Care     Prior to Admission Medications:   Prior to Admission Medications   Prescriptions Last Dose Informant Taking?    Omeprazole delayed release (PRILOSEC D/R) 20 mg tablet   No   Sig: Take 20 mg by mouth daily. aspirin delayed-release 81 mg tablet Unknown at Unknown time  No   Sig: Take 1 Tab by mouth daily. atorvastatin (LIPITOR) 40 mg tablet   No   Sig: Take 1 Tab by mouth daily. carvediloL (COREG) 6.25 mg tablet   No   Sig: Take 1 Tab by mouth two (2) times daily (with meals). clopidogreL (Plavix) 75 mg tab Unknown at Unknown time  No   Sig: Take 75 mg by mouth. ferrous sulfate 325 mg (65 mg iron) tablet   No   Sig: Take 1 Tab by mouth Daily (before breakfast). hydrALAZINE (APRESOLINE) 25 mg tablet   No   Sig: Take 1 Tab by mouth three (3) times daily. lisinopriL (PRINIVIL, ZESTRIL) 5 mg tablet   No   Sig: Take 1 Tab by mouth daily. loratadine (CLARITIN) 10 mg tablet   No   Sig: Take 1 Tab by mouth daily. oxyCODONE-acetaminophen (Percocet) 5-325 mg per tablet   No   Sig: Take 1 Tab by mouth every four (4) hours as needed for Pain.   polyethylene glycol (Miralax) 17 gram packet   No   Sig: Take 17 g by mouth every fourty-eight (48) hours. senna (Senna) 8.6 mg tablet   No   Sig: Take 1 Tab by mouth daily. therapeutic multivitamin (THERAGRAN) tablet   Yes   Sig: Take 1 Tab by mouth daily. Facility-Administered Medications: None     Please contact the main inpatient pharmacy with any questions or concerns at [phone] and we will direct you to the clinical pharmacist covering this patient's care while in-house.    VIV Marti

## 2021-01-21 NOTE — ED PROVIDER NOTES
EMERGENCY DEPARTMENT HISTORY AND PHYSICAL EXAM      Date: 1/20/2021  Patient Name: Chris Richard    History of Presenting Illness     Chief Complaint   Patient presents with    Anemia       History Provided By: Patient, EMS and Caregiver    HPI: Chris Richard, 77 y.o. male presents to the ED with complaints of anemia. Patient is coming from [facility], and was sent over after the patient's hemoglobin levels were checked due to a chronic wound on the top of his left foot. Patient reports that he previously had a wound-vac for the wound, and it had caused him to bleed heavily. The hemoglobin levels that were reported to the nurse prior to arrival was 6.2. Wound on the patient's foot is a stage 3 ulcer with debris in the wound and is about 8.5cm by 5cm. Patient also has a PEG tube in place, but is reported to be able to take some pills and some water by mouth. Denies any blood in the stool or blood in the vomit. There are no other complaints, changes, or physical findings at this time. PCP: Ciaran Schneider., MD    Current Facility-Administered Medications   Medication Dose Route Frequency Provider Last Rate Last Admin    0.9% sodium chloride infusion 250 mL  250 mL IntraVENous PRN Brian Honeycutt MD         Current Outpatient Medications   Medication Sig Dispense Refill    oxyCODONE-acetaminophen (Percocet) 5-325 mg per tablet Take 1 Tab by mouth every four (4) hours as needed for Pain.  polyethylene glycol (Miralax) 17 gram packet Take 17 g by mouth daily.  senna (Senna) 8.6 mg tablet Take 1 Tab by mouth daily.  loratadine (CLARITIN) 10 mg tablet Take 1 Tab by mouth daily. 90 Tab 3    hydrALAZINE (APRESOLINE) 25 mg tablet Take 1 Tab by mouth three (3) times daily. 270 Tab 1    atorvastatin (LIPITOR) 40 mg tablet Take 1 Tab by mouth daily. 90 Tab 1    lisinopriL (PRINIVIL, ZESTRIL) 5 mg tablet Take 1 Tab by mouth daily.  90 Tab 1    carvediloL (COREG) 6.25 mg tablet Take 1 Tab by mouth two (2) times daily (with meals). 180 Tab 1    aspirin delayed-release 81 mg tablet Take 1 Tab by mouth daily. 90 Tab 1    ferrous sulfate 325 mg (65 mg iron) tablet Take 1 Tab by mouth Daily (before breakfast). 90 Tab 1    mirtazapine (REMERON) 15 mg tablet Take 7.5 mg by mouth nightly.  Omeprazole delayed release (PRILOSEC D/R) 20 mg tablet Take 20 mg by mouth daily. Past History     Past Medical History:  Past Medical History:   Diagnosis Date    [facility]rial fibrillation ([facility]     Hypertension     [facility]     [facility]VD [facility]peripheral vascular disease[facility]        Past Surgical History:  Past Surgical History:   Procedure Laterality Date    HX ABOVE KNEE AMPUTATION Right     HX ABOVE KNEE AMPUTATION Right     HX HEART CATHETERIZATION         Family History:  Family History   Problem Relation Age of Onset    Cancer Mother         Ovarian, Cervical    Stroke Father     Cancer Brother        Social History:  Social History     Tobacco Use    Smoking status: Former Smoker    Smokeless tobacco: Never Used   Substance Use Topics    Alcohol use: Not Currently    Drug use: Never       Allergies:  No Known Allergies      Review of Systems   Review of Systems   Constitutional: Negative for chills, diaphoresis and fever. HENT: Negative for congestion, ear pain, rhinorrhea, sore throat and trouble swallowing. Eyes: Negative for photophobia, pain, redness and visual disturbance. Respiratory: Negative for cough, chest tightness, shortness of breath and wheezing. Cardiovascular: Negative for chest pain, palpitations and leg swelling. Gastrointestinal: Negative for abdominal pain, anal bleeding, blood in stool, diarrhea, nausea and vomiting. Endocrine: Negative for polydipsia, polyphagia and polyuria. Genitourinary: Negative for dysuria, frequency and urgency. Musculoskeletal: Negative for back pain, joint swelling and neck pain. Skin: Positive for wound. Negative for color change, pallor and rash. Stage 3 ulcer wound on the dorsal aspect of the left foot; 8.5 cm by 5 cm with debris present in the wound. Allergic/Immunologic: Negative for environmental allergies, food allergies and immunocompromised state. Neurological: Positive for weakness. Negative for seizures, syncope and headaches. Hematological: Negative for adenopathy. Bruises/bleeds easily. Low Hemoglobin levels   Psychiatric/Behavioral: Negative for behavioral problems and confusion. The patient is not nervous/anxious. All other systems reviewed and are negative. Physical Exam   Physical Exam  Vitals signs and nursing note reviewed. Constitutional:       General: He is not in acute distress. Appearance: Normal appearance. He is not diaphoretic. HENT:      Head: Normocephalic and atraumatic. Right Ear: Tympanic membrane, ear canal and external ear normal.      Left Ear: Tympanic membrane, ear canal and external ear normal.      Nose: Nose normal.      Mouth/Throat:      Mouth: Mucous membranes are moist.      Pharynx: Oropharynx is clear. Eyes:      Extraocular Movements: Extraocular movements intact. Conjunctiva/sclera: Conjunctivae normal.      Pupils: Pupils are equal, round, and reactive to light. Neck:      Musculoskeletal: Normal range of motion and neck supple. No neck rigidity or muscular tenderness. Cardiovascular:      Rate and Rhythm: Regular rhythm. Tachycardia present. Pulses: Normal pulses. Heart sounds: Normal heart sounds. Pulmonary:      Effort: Pulmonary effort is normal. No respiratory distress. Breath sounds: Normal breath sounds. Chest:      Chest wall: No tenderness. Abdominal:      General: Bowel sounds are normal.      Palpations: Abdomen is soft. There is no mass. Tenderness: There is no abdominal tenderness. Musculoskeletal: Normal range of motion. General: No swelling or tenderness. Feet:    Feet:      Right foot:      Amputation: Right leg is amputated below knee. Left foot:      Skin integrity: Ulcer present. Skin:     General: Skin is warm. Coloration: Skin is not pale. Findings: No erythema. Neurological:      Mental Status: He is alert and oriented to person, place, and time. Sensory: No sensory deficit. Motor: No weakness. Psychiatric:         Mood and Affect: Mood normal.         Behavior: Behavior normal.         Thought Content: Thought content normal.         Judgment: Judgment normal.         Diagnostic Study Results     Labs -     Recent Results (from the past 12 hour(s))   CBC WITH AUTOMATED DIFF    Collection Time: 01/20/21  6:32 PM   Result Value Ref Range    WBC 9.1 4.6 - 13.2 K/uL    RBC 2.33 (L) 4.70 - 5.50 M/uL    HGB 6.7 (L) 13.0 - 16.0 g/dL    HCT 23.5 (L) 36.0 - 48.0 %    .9 (H) 74.0 - 97.0 FL    MCH 28.8 24.0 - 34.0 PG    MCHC 28.5 (L) 31.0 - 37.0 g/dL    RDW 20.2 (H) 11.6 - 14.5 %    PLATELET 635 (H) 278 - 420 K/uL    MPV 9.2 9.2 - 11.8 FL    NEUTROPHILS 63 40 - 73 %    LYMPHOCYTES 22 21 - 52 %    MONOCYTES 13 (H) 3 - 10 %    EOSINOPHILS 2 0 - 5 %    BASOPHILS 0 0 - 2 %    IMMATURE GRANULOCYTES 0 %    ABS. NEUTROPHILS 5.7 1.8 - 8.0 K/UL    ABS. LYMPHOCYTES 2.0 0.9 - 3.6 K/UL    ABS. MONOCYTES 1.2 0.05 - 1.2 K/UL    ABS. EOSINOPHILS 0.2 0.0 - 0.4 K/UL    ABS. BASOPHILS 0.0 0.0 - 0.1 K/UL    ABS. IMM.  GRANS. 0.0 K/UL    DF Manual      RBC COMMENTS Hypochromia  2+        RBC COMMENTS Anisocytosis  2+        RBC COMMENTS Schistocytes  1+       PROTHROMBIN TIME + INR    Collection Time: 01/20/21  6:32 PM   Result Value Ref Range    Prothrombin time 14.4 11.5 - 15.2 sec    INR 1.2 0.8 - 1.2     METABOLIC PANEL, BASIC    Collection Time: 01/20/21  6:32 PM   Result Value Ref Range    Sodium 139 135 - 145 mmol/L    Potassium 3.9 3.2 - 5.1 mmol/L    Chloride 104 94 - 111 mmol/L    CO2 28 21 - 33 mmol/L    Anion gap 7 mmol/L    Glucose 94 70 - 110 mg/dL    BUN 10 9 - 21 mg/dL    Creatinine 0.50 (L) 0.8 - 1.50 mg/dL    BUN/Creatinine ratio 20      GFR est AA >60 ml/min/1.73m2    GFR est non-AA >60 ml/min/1.73m2    Calcium 9.1 8.5 - 10.5 mg/dL   LACTIC ACID    Collection Time: 01/20/21  6:32 PM   Result Value Ref Range    Lactic acid 1.0 0.5 - 2.0 mmol/L   SED RATE, AUTOMATED    Collection Time: 01/20/21  6:32 PM   Result Value Ref Range    Sed rate (ESR) 100 mm/hr   TYPE & SCREEN    Collection Time: 01/20/21  7:05 PM   Result Value Ref Range    Crossmatch Expiration 01/23/2021,2359     ABO/Rh(D) O Positive     Antibody screen Negative     REUBEN Poly Negative     Unit number [id number]     Blood component type RC LR     Unit division 00     Status of unit Allocated     TRANSFUSION STATUS Ok to transfuse     Crossmatch result Compatible    MAGNESIUM    Collection Time: 01/20/21  7:05 PM   Result Value Ref Range    Magnesium 1.9 1.7 - 2.8 mg/dL   OCCULT BLOOD, STOOL    Collection Time: 01/20/21  7:42 PM   Result Value Ref Range    Occult Blood,day 1 Positive      Day 1 date: 1,202,021         Radiologic Studies -   XR FOOT LT MIN 3 V    (Results Pending)     CT Results  (Last 48 hours)    None        CXR Results  (Last 48 hours)    None          Medical Decision Making and ED Course   I am the first provider for this patient. I reviewed the vital signs, available nursing notes, past medical history, past surgical history, family history and social history. Vital Signs-Reviewed the patient's vital signs.   Patient Vitals for the past 12 hrs:   Temp Pulse Resp BP SpO2   01/21/21 0155 98.9 °F (37.2 °C) (!) 112 16 (!) 158/86 100 %   01/21/21 0040 -- (!) 115 22 (!) 168/98 98 %   01/21/21 0010 -- (!) 113 22 (!) 157/89 97 %   01/20/21 2228 -- (!) 106 18 (!) 164/82 98 %   01/20/21 1825 97.6 °F (36.4 °C) (!) 107 18 (!) 149/87 99 %             Records Reviewed: Nursing Notes and Old Medical Records    The patient presents with     ED Course:   Initial assessment performed. The patients presenting problems have been discussed, and they are in agreement with the care plan formulated and outlined with them. I have encouraged them to ask questions as they arise throughout their visit. Provider Notes (Medical Decision Making):     78 yo Male on plavix/ASA s/p fem-pop surgery 12/2020 with R GAIL, L stg 3 venous ulcer getting iv abx through R arm PICC for pseudomonal infxn, sent to ED for low H/H with Hgb 6.2. In ED was 6.7. Heme+stool with melena, getting 1 unit PRBCs in ED.---> needs GI consult / plavix held. All hospitals in [facility] with NO BEDS. None at [facility] or [facility] either. Patient accepted at [facility] in [place] by ED attending Dr. Hillary Severino, hospitalist Dr. Isidro Orozco and GI Dr. Clay Hoffman. Consultations:       Consultations:  [phone] - Dr. Hillary Severino ED attending accepts in transfer to [facility]. Procedures     FOTB Test: Positive for GI Bleed    Critical Care Time:   CRITICAL CARE NOTE :    7:25 PM    Amount of Critical Care Time: __75_    IMPENDING DETERIORATION -Metabolic  ASSOCIATED RISK FACTORS - Hypotension  MANAGEMENT- Bedside Assessment  INTERPRETATION -  Blood Pressure  INTERVENTIONS - hemodynamic mngmt  CASE REVIEW - Hospitalist  TREATMENT RESPONSE -Improved  PERFORMED BY - Self    NOTES   :  I have spent critical care time involved in lab review, consultations with specialist, family decision- making, bedside attention and documentation. This time excludes time spent in any separate billed procedures. During this entire length of time I was immediately available to the patient . Disposition     Disposition:         DISCHARGE PLAN:  1. Current Discharge Medication List      CONTINUE these medications which have NOT CHANGED    Details   oxyCODONE-acetaminophen (Percocet) 5-325 mg per tablet Take 1 Tab by mouth every four (4) hours as needed for Pain.      polyethylene glycol (Miralax) 17 gram packet Take 17 g by mouth daily.       senna (Senna) 8.6 mg tablet Take 1 Tab by mouth daily. loratadine (CLARITIN) 10 mg tablet Take 1 Tab by mouth daily. Qty: 90 Tab, Refills: 3    Associated Diagnoses: Seasonal allergic rhinitis due to fungal spores      hydrALAZINE (APRESOLINE) 25 mg tablet Take 1 Tab by mouth three (3) times daily. Qty: 270 Tab, Refills: 1    Associated Diagnoses: Benign essential HTN      atorvastatin (LIPITOR) 40 mg tablet Take 1 Tab by mouth daily. Qty: 90 Tab, Refills: 1    Associated Diagnoses: Mixed hyperlipidemia      lisinopriL (PRINIVIL, ZESTRIL) 5 mg tablet Take 1 Tab by mouth daily. Qty: 90 Tab, Refills: 1    Associated Diagnoses: Benign essential HTN      carvediloL (COREG) 6.25 mg tablet Take 1 Tab by mouth two (2) times daily (with meals). Qty: 180 Tab, Refills: 1    Associated Diagnoses: Benign essential HTN      clopidogreL (Plavix) 75 mg tab Take 1 Tab by mouth daily. Qty: 80 Tab, Refills: 2    Associated Diagnoses: Coronary artery disease involving native heart without angina pectoris, unspecified vessel or lesion type      aspirin delayed-release 81 mg tablet Take 1 Tab by mouth daily. Qty: 80 Tab, Refills: 1    Associated Diagnoses: Coronary artery disease involving native heart without angina pectoris, unspecified vessel or lesion type      ferrous sulfate 325 mg (65 mg iron) tablet Take 1 Tab by mouth Daily (before breakfast). Qty: 90 Tab, Refills: 1    Associated Diagnoses: Iron deficiency anemia, unspecified iron deficiency anemia type      mirtazapine (REMERON) 15 mg tablet Take 7.5 mg by mouth nightly. Omeprazole delayed release (PRILOSEC D/R) 20 mg tablet Take 20 mg by mouth daily. 2.   Follow-up Information    None       3. Return to ED if worse     Diagnosis     Clinical Impression:   1. Melanotic stools    2. Anemia, unspecified type    3.  Rectal bleeding        Attestations:    By signing my name below, [employer], attest that this documentation has been prepared under the direction and in presence of Dr. Elvi Cordova on 01/21/21. Electronically signed: Alfonzo Meckel, 01/21/21, 7:25 PM      Please note that this dictation was completed with basno, the computer voice recognition software. Quite often unanticipated grammatical, syntax, homophones, and other interpretive errors are inadvertently transcribed by the computer software. Please disregard these errors. Please excuse any errors that have escaped final proofreading. Thank you.

## 2021-01-21 NOTE — ED NOTES
Spoke to Nirav Jhaveri in pharmacy about none of the medications ordered scanning and she will look into it for me. GI NP at bedside right now and states that the plan is an endoscopy today and possible plan for COVID rapid to be done if he goes to procedure.

## 2021-01-21 NOTE — CONSULTS
[address]  [address], [address]  [phone]                     GI CONSULTATION NOTE      NAME:  Devi Rodriguez   :   1955   MRN:   [id number]       Referring Physician: Dr. Boo Dumont Date: 2021     Chief Complaint: GI Bleed    History of Present Illness:  Patient is a 77 y.o. who is seen in consultation at the request of Dr. Angel Epps for GI bleed. Patient is transfer from [facility] by [facility] for anemia. Occult stool testing positive for blood and hemoglobin found to be 6.7 on admission. Patient given 1 unit of PRBCs. Patient lives in nursing facility where hemoglobin was found to be low and was sent to emergency room for evaluation. Patient transferred to [address] [place] [address] for need of GI evaluation. Some history taken from patient, patient is poor historian. Charted reviewed and spoke with nurse. Patient with past medical history of atrial fibrillation, CAD, hypertension, dyslipidemia, s/p CABG, CVA, CHF, COPD, CKD, peripheral vascular disease, s/p right AKA anemia, s/p PEG placement. Patient denies nausea, no vomiting, no abdominal pain, no shortness of breath, no dysphagia, no chest pain, and no fever. Patient with immobility, living in nursing facility. He reports he hadn't been told of any blood in his stools. He reports having intermittent loose stools. Patient stated he doesn't recall last use of PEG for tube feeding- stated he has PEG due to loss of appetite and not dysphagia. Stated was placed a few months ago. Stated he has had significant weight loss in the past few months, cant quantify amount. Denies smoking, no alcohol, no NSAID's. Patient denies ever having EGD. Stated colonoscopy many years ago with no findings. Denies colorectal cancer and no polyps.       PMH:  Past Medical History:   Diagnosis Date    [facility] ([facility]     Hypertension     MI (myocardial infarction) ([facility]     PVD (peripheral vascular disease) ([facility] PSH:  Past Surgical History:   Procedure Laterality Date    HX ABOVE KNEE AMPUTATION Right     HX ABOVE KNEE AMPUTATION Right     HX HEART CATHETERIZATION         Allergies:  No Known Allergies    Home Medications:  Prior to Admission Medications   Prescriptions Last Dose Informant Patient Reported? Taking? Omeprazole delayed release (PRILOSEC D/R) 20 mg tablet   Yes No   Sig: Take 20 mg by mouth daily. aspirin delayed-release 81 mg tablet   No No   Sig: Take 1 Tab by mouth daily. atorvastatin (LIPITOR) 40 mg tablet   No No   Sig: Take 1 Tab by mouth daily. carvediloL (COREG) 6.25 mg tablet   No No   Sig: Take 1 Tab by mouth two (2) times daily (with meals). ferrous sulfate 325 mg (65 mg iron) tablet   No No   Sig: Take 1 Tab by mouth Daily (before breakfast). hydrALAZINE (APRESOLINE) 25 mg tablet   No No   Sig: Take 1 Tab by mouth three (3) times daily. lisinopriL (PRINIVIL, ZESTRIL) 5 mg tablet   No No   Sig: Take 1 Tab by mouth daily. loratadine (CLARITIN) 10 mg tablet   No No   Sig: Take 1 Tab by mouth daily. mirtazapine (REMERON) 15 mg tablet   Yes No   Sig: Take 7.5 mg by mouth nightly. oxyCODONE-acetaminophen (Percocet) 5-325 mg per tablet   Yes No   Sig: Take 1 Tab by mouth every four (4) hours as needed for Pain.   polyethylene glycol (Miralax) 17 gram packet   Yes No   Sig: Take 17 g by mouth daily. senna (Senna) 8.6 mg tablet   Yes No   Sig: Take 1 Tab by mouth daily.       Facility-Administered Medications: None       Hospital Medications:  Current Facility-Administered Medications   Medication Dose Route Frequency    atorvastatin (LIPITOR) tablet 40 mg  40 mg Oral DAILY    carvediloL (COREG) tablet 6.25 mg  6.25 mg Oral BID WITH MEALS    ferrous sulfate tablet 325 mg  325 mg Oral ACB    hydrALAZINE (APRESOLINE) tablet 25 mg  25 mg Oral TID    lisinopriL (PRINIVIL, ZESTRIL) tablet 5 mg  5 mg Oral DAILY    loratadine (CLARITIN) tablet 10 mg  10 mg Oral DAILY    mirtazapine (REMERON) tablet 7.5 mg  7.5 mg Oral QHS    oxyCODONE-acetaminophen (PERCOCET) 5-325 mg per tablet 1 Tab  1 Tab Oral Q4H PRN    polyethylene glycol (MIRALAX) packet 17 g  17 g Oral DAILY    senna (SENOKOT) tablet 8.6 mg  1 Tab Oral DAILY    sodium chloride (NS) flush 5-40 mL  5-40 mL IntraVENous Q8H    sodium chloride (NS) flush 5-40 mL  5-40 mL IntraVENous PRN    acetaminophen (TYLENOL) tablet 650 mg  650 mg Oral Q6H PRN    Or    acetaminophen (TYLENOL) suppository 650 mg  650 mg Rectal Q6H PRN    promethazine (PHENERGAN) tablet 12.5 mg  12.5 mg Oral Q6H PRN    Or    ondansetron (ZOFRAN) injection 4 mg  4 mg IntraVENous Q6H PRN    lactobac ac& pc-s.therm-b.anim (MELODY Q/RISAQUAD)  1 Cap Oral DAILY    cefepime (MAXIPIME) 2 g in 0.9% sodium chloride (MBP/ADV) 100 mL MBP  2 g IntraVENous Q24H    vancomycin (VANCOCIN) 1,000 mg in 0.9% sodium chloride 250 mL (VIAL-MATE)  1 g IntraVENous ONCE    doxycycline (VIBRAMYCIN) 100 mg in 0.9% sodium chloride (MBP/ADV) 100 mL MBP  100 mg IntraVENous Q12H    pantoprazole (PROTONIX) 40 mg in 0.9% sodium chloride 10 mL injection  40 mg IntraVENous Q12H    vancomycin (VANCOCIN) 500 mg in 0.9% sodium chloride (MBP/ADV) 100 mL MBP  500 mg IntraVENous Q12H    Vancomycin Pharmacy Dosing   Other PRN     Current Outpatient Medications   Medication Sig    oxyCODONE-acetaminophen (Percocet) 5-325 mg per tablet Take 1 Tab by mouth every four (4) hours as needed for Pain.  polyethylene glycol (Miralax) 17 gram packet Take 17 g by mouth daily.  senna (Senna) 8.6 mg tablet Take 1 Tab by mouth daily.  loratadine (CLARITIN) 10 mg tablet Take 1 Tab by mouth daily.  hydrALAZINE (APRESOLINE) 25 mg tablet Take 1 Tab by mouth three (3) times daily.  atorvastatin (LIPITOR) 40 mg tablet Take 1 Tab by mouth daily.  lisinopriL (PRINIVIL, ZESTRIL) 5 mg tablet Take 1 Tab by mouth daily.     carvediloL (COREG) 6.25 mg tablet Take 1 Tab by mouth two (2) times daily (with meals).  aspirin delayed-release 81 mg tablet Take 1 Tab by mouth daily.  ferrous sulfate 325 mg (65 mg iron) tablet Take 1 Tab by mouth Daily (before breakfast).  mirtazapine (REMERON) 15 mg tablet Take 7.5 mg by mouth nightly.  Omeprazole delayed release (PRILOSEC D/R) 20 mg tablet Take 20 mg by mouth daily. Social History:  Social History     Tobacco Use    Smoking status: Former Smoker    Smokeless tobacco: Never Used   Substance Use Topics    Alcohol use: Not Currently       Family History:  Family History   Problem Relation Age of Onset    Cancer Mother         Ovarian, Cervical    Stroke Father     Cancer Brother        Review of Systems:    Constitutional: negative fever, negative chills, + weight loss  Eyes:   negative visual changes  ENT:   negative sore throat, tongue or lip swelling  Respiratory:  negative cough, negative dyspnea  Cards:  negative for chest pain, palpitations, lower extremity edema  GI:   See HPI  :  negative for frequency, dysuria  Integument:  negative for rash and pruritus  Heme:  negative for easy bruising and gum/nose bleeding  Musculoskel: negative for myalgias,  back pain and muscle weakness  Neuro: negative for headaches, dizziness, vertigo  Psych:  negative for feelings of anxiety, depression      Objective:     Patient Vitals for the past 8 hrs:   BP Temp Pulse Resp SpO2   01/21/21 0833 -- -- -- -- 100 %   01/21/21 0830 (!) 176/93 -- (!) 106 20 100 %   01/21/21 0630 (!) 171/98 -- (!) 106 19 100 %   01/21/21 0345 (!) 156/111 98.4 °F (36.9 °C) (!) 110 24 100 %   01/21/21 0342 (!) 167/95 -- -- -- 99 %     No intake/output data recorded. No intake/output data recorded. PHYSICAL EXAM:  General: WD, WN. Alert, cooperative, no acute distress    HEENT: NC, Atraumatic. PERRLA, EOMI. Anicteric sclerae. Lungs:  CTA Bilaterally. No Wheezing/Rhonchi/Rales.   Heart:  Regular  rhythm,  No murmur   Abdomen: Soft, Non distended, Non tender.  +Bowel sounds, PEG tube in place/clamped to left abdomen   Extremities:     Left foo1 with dressing, right AKA  Neurologic:   Alert and oriented X 3. No acute neurological distress   Psych:   Good insight. Not anxious nor agitated. Data Review     Recent Labs     01/21/21  0706 01/20/21  1832   WBC 8.5 9.1   HGB 8.4* 6.7*   HCT 27.1* 23.5*   * 612*     Recent Labs     01/20/21  1832      K 3.9      CO2 28   BUN 10   CREA 0.50*   GLU 94   CA 9.1     No results for input(s): AP, TBIL, TP, ALB, GLOB, GGT, AML, LPSE in the last 72 hours. No lab exists for component: SGOT, GPT, AMYP, HLPSE  Recent Labs     01/20/21  1832   INR 1.2   PTP 14.4                Assessment:   Patient Active Problem List   Diagnosis Code    Sepsis ([facility] A41.9    Lactic acid increased E87.2    Hypotension I95.9    Wound, open, foot S91.309A    Hx of CABG Z95.1    CAD (coronary artery disease) I25.10    Hyperlipidemia, mixed E78.2    Depression, major F32.9    HTN (hypertension), benign I10    Tachycardia R00.0    Chronic atrial fibrillation (HCC) I48.20    COPD (chronic obstructive pulmonary disease) ([facility] J44.9    CHF (congestive heart failure) ([facility]) I50.9    CKD (chronic kidney disease) N18.9    Anemia D64.9    Acute upper GI bleed K92.2            Plan:   Heme positive stool/ Anemia:  No over bleeding   -Hemoglobin 6.7 on admission- received 1 unit of PRBC's, Hemoglobin up to 8.4   -Continue to monitor H & H, transfuse if hemoglobin less than 7.0  -BID PPI with Protonix   -Possible need for endoscopic evaluation for possible bleeding gastric ulcer, AVM's, etc. Patient is agreeable to procedure  -COVID 19 negative for 1/21/2021    CAD/ Atrial Fibrillation/ s/p CVA/ PEG placement/ s/p CABG/ PVD/ left foot wound, Right AKA:  -management per hospitalist  -podiatry consulted  -Chest x ray 1/21/2021: No acute process        I have examined the patient.   I have reviewed the chart and agree with the documentation recorded by the NP, including the assessment, treatment plan, and disposition. ASSESSMENT AND PLAN:  77 yr old male has presented with anemia and heme positive stool. He has history of multiple co-morbidities including CAD, MI, stroke and A fibrillation. Differential diagnosis includes peptic ulcer, gastritis, AVMs, GI tract neoplasms, etc.  NPO  Diagnostic EGD only today as he is on Plavix. IV PPI  Transfuse to keep Hb over 7  Further recommendations after EGD. Thank you for consultation.       Damaso Mack MD

## 2021-01-21 NOTE — PROGRESS NOTES
Day #1 of Cefepime  Indication:  SSTI  Current regimen:  2 grams Q12hr  Abx regimen: van+cefepime  Recent Labs     21  1832   WBC 9.1   CREA 0.50*   BUN 10     Est CrCl: 56 ml/min; UO:  ml/kg/hr  Temp (24hrs), Av.3 °F (36.8 °C), Min:97.6 °F (36.4 °C), Max:98.9 °F (37.2 °C)      Plan: Change to cefepime 2 grams q24 hr per renal dosing protocol

## 2021-01-21 NOTE — ED NOTES
Beside shift report received from [facility], [address] [place] [address]. NAD noted. Awaiting CBC to result and pt is NPO at this time d/t GI bleeding. Explained to pt why is is NPO and that he is waiting on a hospital bed upstairs.

## 2021-01-21 NOTE — ED NOTES
TRANSFER - OUT REPORT:    Verbal report given to Alejandro Ford on Tod Reid  being transferred to [facility] ED for routine progression of care       Report consisted of patients Situation, Background, Assessment and   Recommendations(SBAR). Information from the following report(s) SBAR, ED Summary, MAR, Recent Results and Cardiac Rhythm NSR was reviewed with the receiving nurse. Lines:       Opportunity for questions and clarification was provided.       Patient transported with:   Monitor  Registered Nurse

## 2021-01-21 NOTE — ED PROVIDER NOTES
HPI 78-year-old male with a history of peripheral vascular disease, atrial fibrillation, hypertension, coronary artery disease, presents the emergency department in transfer from [facility] with a low hemoglobin and GI bleed. Patient was given a unit of packed red cells prior to transfer. He was admitted last month to that facility for an infected left foot and was sent to a facility for IV antibiotics.      Past Medical History:   Diagnosis Date    Atrial fibrillation ([facility]     Hypertension     MI (myocardial infarction) ([facility]     PVD (peripheral vascular disease) ([facility]        Past Surgical History:   Procedure Laterality Date    HX ABOVE KNEE AMPUTATION Right     HX ABOVE KNEE AMPUTATION Right     HX HEART CATHETERIZATION           Family History:   Problem Relation Age of Onset    Cancer Mother         Ovarian, Cervical    Stroke Father     Cancer Brother        Social History     Socioeconomic History    Marital status:      Spouse name: Not on file    Number of children: Not on file    Years of education: Not on file    Highest education level: Not on file   Occupational History    Not on file   Social Needs    Financial resource strain: Not on file    Food insecurity     Worry: Not on file     Inability: Not on file    Transportation needs     Medical: Not on file     Non-medical: Not on file   Tobacco Use    Smoking status: Former Smoker    Smokeless tobacco: Never Used   Substance and Sexual Activity    Alcohol use: Not Currently    Drug use: Never    Sexual activity: Not Currently   Lifestyle    Physical activity     Days per week: Not on file     Minutes per session: Not on file    Stress: Not on file   Relationships    Social connections     Talks on phone: Not on file     Gets together: Not on file     Attends Religion service: Not on file     Active member of club or organization: Not on file     Attends meetings of clubs or organizations: Not on file Relationship status: Not on file    Intimate partner violence     Fear of current or ex partner: Not on file     Emotionally abused: Not on file     Physically abused: Not on file     Forced sexual activity: Not on file   Other Topics Concern    Not on file   Social History Narrative    Not on file         ALLERGIES: Patient has no known allergies. Review of Systems   Constitutional: Negative for fever. Eyes: Negative for visual disturbance. Respiratory: Negative for cough and shortness of breath. Cardiovascular: Negative for chest pain. Gastrointestinal: Negative for abdominal pain, nausea and vomiting. Musculoskeletal: Positive for gait problem. Skin: Positive for wound. Neurological: Negative for headaches. Psychiatric/Behavioral: Negative for dysphoric mood. Vitals:    01/21/21 0342 01/21/21 0345   BP: (!) 167/95 (!) 156/111   Pulse:  (!) 110   Resp:  24   Temp:  98.4 °F (36.9 °C)   SpO2: 99% 100%            Physical Exam  Constitutional:       General: He is not in acute distress. Appearance: He is well-developed. HENT:      Head: Normocephalic and atraumatic. Mouth/Throat:      Pharynx: No oropharyngeal exudate. Eyes:      General: No scleral icterus. Right eye: No discharge. Left eye: No discharge. Pupils: Pupils are equal, round, and reactive to light. Neck:      Musculoskeletal: Normal range of motion and neck supple. Vascular: No JVD. Cardiovascular:      Rate and Rhythm: Tachycardia present. Rhythm irregular. Heart sounds: Normal heart sounds. No murmur. Pulmonary:      Effort: Pulmonary effort is normal. No respiratory distress. Breath sounds: Normal breath sounds. No stridor. No wheezing or rales. Chest:      Chest wall: No tenderness. Abdominal:      General: Bowel sounds are normal. There is no distension. Palpations: Abdomen is soft. There is no mass. Tenderness: There is no abdominal tenderness.  There is no guarding or rebound. Musculoskeletal: Normal range of motion. Comments: BKA on right  Ulcer to bottom or right heal and ulceration to top of right foot   Skin:     General: Skin is warm and dry. Capillary Refill: Capillary refill takes less than 2 seconds. Findings: No rash. Neurological:      Mental Status: He is oriented to person, place, and time. Psychiatric:         Behavior: Behavior normal.         Thought Content: Thought content normal.         Judgment: Judgment normal.          MDM       Procedures      Perfect Serve Consult for Admission  5:59 AM    ED Room Number: HK31/59  Patient Name and age:  Blade Jurado 77 y.o.  male  Working Diagnosis:   1. Other iron deficiency anemia    2. Heme positive stool        COVID-19 Suspicion:  no  Sepsis present:  no  Reassessment needed: no  Code Status:  Full Code  Readmission: yes  Isolation Requirements:  no  Recommended Level of Care:  telemetry  Department:[facility] Adult ED - 21   Other:  77year old with peripheral vascular diease, CAD, chronic atrial fib not on any anticoagulation other then plavix,  recently admitted to [facility] for an infected left foot, seen in their ED for dropping hemoglobin of 6.7. He is currently in a facility getting IV antibiotics. He is on plavix. He reportedly has melanotic stools (of note they noticed during his last admission in December) He is tachycardic in the 110's, BP is fine. He received 1 unit pRBC in route. I have another cbc pending.

## 2021-01-21 NOTE — ED NOTES
Emergent blood received from blood bank. This nurse at bedside to administer. Unable to scan unit of blood as it was issued emergently. Unit started at this time at 125 ml/hr as pt has tolerated many blood transfusions in the past. Crossmatch performed prior to issuing of unit    Unit number  [id number]  Donor Blood type- O Positive  Patient Blood Type- O Positive  Expiration Date February 3 2021  Patients [place] BE [id number]    Administration of Unit Verified by 2nd RN Kerline Hernández. Vitals at time of transfusion: Temp 98.9 , , SPO2 100% RA, /86.

## 2021-01-22 ENCOUNTER — APPOINTMENT (OUTPATIENT)
Dept: VASCULAR SURGERY | Age: 66
DRG: 474 | End: 2021-01-22
Attending: SURGERY
Payer: MEDICARE

## 2021-01-22 LAB
ABO + RH BLD: NORMAL
ANION GAP SERPL CALC-SCNC: 9 MMOL/L (ref 5–15)
BLD PROD TYP BPU: NORMAL
BLOOD GROUP ANTIBODIES SERPL: NEGATIVE
BPU ID: NORMAL
BUN SERPL-MCNC: 10 MG/DL (ref 6–20)
BUN/CREAT SERPL: 21 (ref 12–20)
CALCIUM SERPL-MCNC: 9.1 MG/DL (ref 8.5–10.1)
CHLORIDE SERPL-SCNC: 107 MMOL/L (ref 97–108)
CO2 SERPL-SCNC: 24 MMOL/L (ref 21–32)
CREAT SERPL-MCNC: 0.47 MG/DL (ref 0.7–1.3)
CROSSMATCH RESULT,%XM: NORMAL
DAT POLY-SP REAG RBC QL: NEGATIVE
ERYTHROCYTE [DISTWIDTH] IN BLOOD BY AUTOMATED COUNT: 22 % (ref 11.5–14.5)
GLUCOSE SERPL-MCNC: 63 MG/DL (ref 65–100)
HCT VFR BLD AUTO: 27.6 % (ref 36.6–50.3)
HEMOCCULT STL QL: POSITIVE
HGB BLD-MCNC: 8.6 G/DL (ref 12.1–17)
MCH RBC QN AUTO: 29.2 PG (ref 26–34)
MCHC RBC AUTO-ENTMCNC: 31.2 G/DL (ref 30–36.5)
MCV RBC AUTO: 93.6 FL (ref 80–99)
NRBC # BLD: 0 K/UL (ref 0–0.01)
NRBC BLD-RTO: 0 PER 100 WBC
PLATELET # BLD AUTO: 531 K/UL (ref 150–400)
PMV BLD AUTO: 8.9 FL (ref 8.9–12.9)
POTASSIUM SERPL-SCNC: 3.3 MMOL/L (ref 3.5–5.1)
RBC # BLD AUTO: 2.95 M/UL (ref 4.1–5.7)
SODIUM SERPL-SCNC: 140 MMOL/L (ref 136–145)
SPECIMEN EXP DATE BLD: NORMAL
STATUS OF UNIT,%ST: NORMAL
TRANSFUSION STATUS PATIENT QL: NORMAL
UNIT DIVISION, %UDIV: 0
WBC # BLD AUTO: 8.8 K/UL (ref 4.1–11.1)

## 2021-01-22 PROCEDURE — 36415 COLL VENOUS BLD VENIPUNCTURE: CPT

## 2021-01-22 PROCEDURE — 80048 BASIC METABOLIC PNL TOTAL CA: CPT

## 2021-01-22 PROCEDURE — 74011250637 HC RX REV CODE- 250/637: Performed by: HOSPITALIST

## 2021-01-22 PROCEDURE — C9113 INJ PANTOPRAZOLE SODIUM, VIA: HCPCS | Performed by: INTERNAL MEDICINE

## 2021-01-22 PROCEDURE — 85027 COMPLETE CBC AUTOMATED: CPT

## 2021-01-22 PROCEDURE — 74011250636 HC RX REV CODE- 250/636: Performed by: INTERNAL MEDICINE

## 2021-01-22 PROCEDURE — 93922 UPR/L XTREMITY ART 2 LEVELS: CPT

## 2021-01-22 PROCEDURE — 74011000250 HC RX REV CODE- 250: Performed by: INTERNAL MEDICINE

## 2021-01-22 PROCEDURE — 74011250637 HC RX REV CODE- 250/637: Performed by: INTERNAL MEDICINE

## 2021-01-22 PROCEDURE — 87186 SC STD MICRODIL/AGAR DIL: CPT

## 2021-01-22 PROCEDURE — 87077 CULTURE AEROBIC IDENTIFY: CPT

## 2021-01-22 PROCEDURE — 74011000258 HC RX REV CODE- 258: Performed by: INTERNAL MEDICINE

## 2021-01-22 PROCEDURE — 87205 SMEAR GRAM STAIN: CPT

## 2021-01-22 PROCEDURE — 82272 OCCULT BLD FECES 1-3 TESTS: CPT

## 2021-01-22 PROCEDURE — 93926 LOWER EXTREMITY STUDY: CPT

## 2021-01-22 PROCEDURE — 65660000000 HC RM CCU STEPDOWN

## 2021-01-22 RX ORDER — LISINOPRIL 10 MG/1
10 TABLET ORAL DAILY
Status: DISCONTINUED | OUTPATIENT
Start: 2021-01-22 | End: 2021-02-02 | Stop reason: HOSPADM

## 2021-01-22 RX ORDER — POTASSIUM CHLORIDE 750 MG/1
40 TABLET, FILM COATED, EXTENDED RELEASE ORAL
Status: COMPLETED | OUTPATIENT
Start: 2021-01-22 | End: 2021-01-22

## 2021-01-22 RX ORDER — HYDRALAZINE HYDROCHLORIDE 20 MG/ML
10 INJECTION INTRAMUSCULAR; INTRAVENOUS
Status: DISCONTINUED | OUTPATIENT
Start: 2021-01-22 | End: 2021-02-02 | Stop reason: HOSPADM

## 2021-01-22 RX ADMIN — ATORVASTATIN CALCIUM 40 MG: 40 TABLET, FILM COATED ORAL at 09:09

## 2021-01-22 RX ADMIN — HYDRALAZINE HYDROCHLORIDE 25 MG: 50 TABLET, FILM COATED ORAL at 18:03

## 2021-01-22 RX ADMIN — POTASSIUM CHLORIDE 40 MEQ: 750 TABLET, EXTENDED RELEASE ORAL at 09:08

## 2021-01-22 RX ADMIN — DOXYCYCLINE 100 MG: 100 INJECTION, POWDER, LYOPHILIZED, FOR SOLUTION INTRAVENOUS at 22:13

## 2021-01-22 RX ADMIN — LORATADINE 10 MG: 10 TABLET ORAL at 10:27

## 2021-01-22 RX ADMIN — CARVEDILOL 6.25 MG: 6.25 TABLET, FILM COATED ORAL at 18:03

## 2021-01-22 RX ADMIN — VANCOMYCIN HYDROCHLORIDE 500 MG: 500 INJECTION, POWDER, LYOPHILIZED, FOR SOLUTION INTRAVENOUS at 11:31

## 2021-01-22 RX ADMIN — Medication 10 ML: at 22:15

## 2021-01-22 RX ADMIN — DOXYCYCLINE 100 MG: 100 INJECTION, POWDER, LYOPHILIZED, FOR SOLUTION INTRAVENOUS at 10:27

## 2021-01-22 RX ADMIN — SODIUM CHLORIDE 40 MG: 9 INJECTION INTRAMUSCULAR; INTRAVENOUS; SUBCUTANEOUS at 22:11

## 2021-01-22 RX ADMIN — CARVEDILOL 6.25 MG: 6.25 TABLET, FILM COATED ORAL at 09:09

## 2021-01-22 RX ADMIN — Medication 8.6 MG: at 10:27

## 2021-01-22 RX ADMIN — HYDRALAZINE HYDROCHLORIDE 25 MG: 50 TABLET, FILM COATED ORAL at 09:10

## 2021-01-22 RX ADMIN — CEFEPIME 2 G: 2 INJECTION, POWDER, FOR SOLUTION INTRAVENOUS at 09:05

## 2021-01-22 RX ADMIN — HYDRALAZINE HYDROCHLORIDE 25 MG: 50 TABLET, FILM COATED ORAL at 22:11

## 2021-01-22 RX ADMIN — Medication 1 CAPSULE: at 09:08

## 2021-01-22 RX ADMIN — LISINOPRIL 10 MG: 10 TABLET ORAL at 09:11

## 2021-01-22 RX ADMIN — Medication 10 ML: at 14:55

## 2021-01-22 RX ADMIN — SODIUM CHLORIDE 40 MG: 9 INJECTION INTRAMUSCULAR; INTRAVENOUS; SUBCUTANEOUS at 09:03

## 2021-01-22 RX ADMIN — FERROUS SULFATE TAB 325 MG (65 MG ELEMENTAL FE) 325 MG: 325 (65 FE) TAB at 10:27

## 2021-01-22 NOTE — CONSULTS
[address]    Name:  Umair Black  MR#:  [id number]  :  1955  ACCOUNT #:  [de-identified]  DATE OF SERVICE:  2021      REASON FOR CONSULTATION:  Ulceration of his left foot with known peripheral arterial disease. HISTORY OF PRESENT ILLNESS:  Briefly, this is a 78-year-old gentleman transferred from [facility] Emergency Room for GI bleed. The patient was noted in his nursing home to have a hemoglobin of 6.2, dizziness, and tachycardia. He received a unit of packed cells when his hemoglobin was noted to be 6.2 and transferred to [facility] where he has received another unit of blood and still has a hemoglobin of 6.7. Upper endoscopy showed two nonbleeding descending duodenal AV malformations and no other source. The patient is a chronically ill gentleman, status post previous right above-knee amputation following coronary artery bypass grafting last year with nonhealing saphenectomy incisions on the right. The patient appears to have a fresh incision that is healing on the left leg and has been cared for by Dr. Eugenia Hernandez at [facility].  He was also planning skin grafting to a large dorsal wound and possibly calcaneal wounds on , next week. We were asked to evaluate the patient's foot. In discussion with the patient, he desperately wants to save his leg; however, he states he has not put any pressure on his leg and is nonambulatory. Dr. Chan Murdock saw the patient's wounds and asked for us to assess it. PAST MEDICAL HISTORY:  As noted. 1.  Basically, failure to thrive following coronary artery bypass and right AK amputation. 2.  He also has acute upper GI bleed that is being evaluated. 3.  Acute blood loss anemia from a GI source. 4.  He has possible osteomyelitis of his foot and was scheduled for an MRI. 5.  He also has unknown degree of peripheral arterial disease and may have recently received at least a fem-pop bypass from Dr. Eugenia Hernandez.   6. The patient also has hypertension. 7.  There has been some coronary artery disease. 8.  PEG for his nutrition. 9.  He is a full code. MEDICATIONS:  As noted. SOCIAL HISTORY:  Lives in full care in a nursing home. REVIEW OF SYSTEMS:  CONSTITUTIONAL:  No fever or chills. He is being ruled out for COVID but states that he does not have any symptoms at this present time or fever. RESPIRATORY:  No cough or hemoptysis. CARDIOVASCULAR:  He denies chest pain. PSYCHIATRIC:  No mood disorder or depression. VASCULAR:  Right AKA, recent possible vascular surgery on the left with large wounds on his left foot. IMPRESSION:  Severe peripheral arterial disease in a nonambulatory patient. I know the patient wants to keep his foot; however, he may benefit from an eventual left above-knee amputation. We will order some vascular studies in the form of ankle-brachial index and arterial duplex of the left leg with further recommendations to follow. He has been seen by the wound healing people, and we will follow with you. He also is to continue his intravenous antibiotics for pseudomonas, which was discovered on a culture of his foot. He is also for an MRI to rule out osteomyelitis of his forefoot and calcaneus.       Nancy Ochoa MD      [id number]  D:  01/22/2021 12:39  T:  01/22/2021 15:13  JOB #:  [id number]  CC:  OMA Cruz MD Nicanor Brighter, MD

## 2021-01-22 NOTE — PROGRESS NOTES
Comprehensive Nutrition Assessment    Type and Reason for Visit: Initial    Nutrition Recommendations/Plan:     1. Begin: 100 mg Thiamine, 220 mg zinc sulfate, Chewable MVI BID    2. Tonight:    Start nocturnal feeding of Osmolite 1.5 @ 40 ml/hr x 10 hours  (9pm-7am) to allow for oral intake during the day. 3.  If enteral feeding tolerated begin to increase feeding by 10 ml/hr nightly towards goal of 70 ml/hr x10 hours. 4.  Monitor labs, lytes ( mag, phos, potassium) for trends as pt is at high risk for refeeding. Nutrition Assessment:      77year old admitted for GI bleed, low Hgb, received blood transfusion. PMH: atrial fibrillation, CAD, hypertension, dyslipidemia, s/p CABG, CVA, CHF, COPD, CKD, peripheral vascular disease, s/p right AKA anemia, s/p PEG placement, no dysphagia noted. Interviewed pt bedside and daughter on phone. Pt resides in NH , wound vac removed. Pt with 9# wt loss over the past 3 week and 19# or 18% wt loss over the past 3+ months reflecting severe wt change. Pt is a very picky eater and when living with daughter all favorite foods were made and also received 2 Ensure via PEG. Once in nursing home,  pt did not as well and PEG not utilized. Daughter would make foods daily to bring to father. Pt and daughter agreeable to start overnight feedings tonight to provide additional calories/protein for healing. Begin tonight using Osmolite 1.5 @ 40 ml/hr x 10 hours with 100 ml water flush before feedings and 100 ml after feedings stop. This will provide: 400 ml, 600 calories, 25 grams protein and with flush: 504 ml total fluid. If enteral feeding tolerated begin to increase feeding by 10 ml/hr nightly towards goal of 70 ml/hr x10 hours to provide: 700 ml, 1050 calories, 45 grams protein.       Malnutrition Assessment:  Malnutrition Status:  Severe malnutrition    Context:  Social/environmental circumstances     Findings of the 6 clinical characteristics of malnutrition: Energy Intake:  7 - 50% or less est energy requirements for 1 month or longer  Weight Loss:  7.00 - Greater than 10% over 6 months     Body Fat Loss:  7 - Severe body fat loss, Buccal region, Fat overlying ribs, Orbital, Triceps   Muscle Mass Loss:  7 - Severe muscle mass loss, Clavicles (pectoralis &deltoids), Hand (interosseous), Scapula (trapezius), Temples (temporalis)  Fluid Accumulation:  Unable to assess,     Strength:  Not performed       Estimated Daily Nutrient Needs:  Energy (kcal): 7032-0519 kcal/day MSJ 1141 x 1.2-1.4; (wt gain: 2053-7380 kcal/day); Weight Used for Energy Requirements: Current  Protein (g): 85 gm/day ( 1.5 gm/kg IBW); Weight Used for Protein Requirements: Ideal  Fluid (ml/day): 1625-4788 ml; Method Used for Fluid Requirements:        Wounds:     Rt AKA  Left dorsal foot, stage 4 pressure injury  Left heel stage 4 pressure injury   Xray of left concerning for osteomyelitis     Current Nutrition Therapies:  DIET MECHANICAL SOFT  DIET ONE TIME MESSAGE  DIET ONE TIME MESSAGE  DIET NUTRITIONAL SUPPLEMENTS Breakfast, Lunch; Ensure Enlive  DIET TUBE FEEDING Begin enteral feedings tonight of Osmolite 1.5 40 ml/hr @ 9pm and run until 7 am. Flush with 100 ml water before start of feeding and end of feeding.   DIET ONE TIME MESSAGE    Anthropometric Measures:  · Height:  5' 7\" (170.2 cm)  · Current Body Wt:  39 kg (86 lb)   · Admission Body Wt:  86 lb    · Usual Body Wt:  54.4 kg (120 lb)     · Ideal Body Wt:  148 lbs:  58.1 %   · Adjusted Body Weight:  94.7; Weight Adjustment for: Amputation   · Adjusted BMI:  14.9    · BMI Category:  Underweight (BMI less than 22) age over 72       Nutrition Diagnosis:   · Inadequate oral intake, Increased nutrient needs, Underweight related to inadequate protein-energy intake, increased demand for energy/nutrients as evidenced by poor intake prior to admission, wounds      Nutrition Interventions:   Food and/or Nutrient Delivery: Continue current diet, Start oral nutrition supplement  Nutrition Education and Counseling: No recommendations at this time  Coordination of Nutrition Care: Continue to monitor while inpatient    Goals:  Pt to consume 50% meals, 1 supplement and tolerate nocturnal enteal feedings x 5-7 days. Nutrition Monitoring and Evaluation:   Behavioral-Environmental Outcomes: None identified  Food/Nutrient Intake Outcomes: Food and nutrient intake, Supplement intake, Vitamin/mineral intake  Physical Signs/Symptoms Outcomes: Biochemical data, GI status, Meal time behavior, Weight    Discharge Planning:     Too soon to determine     Electronically signed by Troy Jose RD on 1/22/2021 at 5:59 PM    Contact: Fabiola

## 2021-01-22 NOTE — ROUTINE PROCESS
Occupational Therapy 1300 -   01.22.2021    Orders acknowledged and chart reviewed in prep for OT evaluation. Discussed patient case with PT who reports patient with R amputation and L LE wound with exposed bone - plan for skin graft next week. Patient not appropriate for skilled intervention at this time, please re-consult OT post sx. Will sign off. [facility] BAYLEE Fabian, MS, OTR/L

## 2021-01-22 NOTE — ED NOTES
Report received from [facility] in Endo. Pt has a few AVMs that they did not cauterized d/t pt not wanting to take the risk of bleeding d/t him taking plavix currently. Stable upon return, NAD noted and resting well.  Awaiting bed assignment

## 2021-01-22 NOTE — PROGRESS NOTES
[address] [facility] Adult  Hospitalist Group                                                                                          Hospitalist Progress Note  Silvestre Mercer MD  Answering service: [phone] from in house phone        Date of Service:  2021  NAME:  Devi Rodriguez  :  1955  MRN:  [id number]      Admission Summary: This is a 57-year-old man with past medical history significant for hypertension; dyslipidemia; peripheral artery disease, status post right above-knee amputation; coronary artery disease, status post CABG; chronic atrial fibrillation; status post CVA; and status post PEG tube placement, who was in his usual state of health until the day of his presentation at the emergency room when it was reported that the patient's hemoglobin was low at the nursing home where the patient presently resides. Hemoglobin was checked at the facility because of his chronic left foot ulcer. The patient was found to have hemoglobin of 6.2. He was sent to [facility] Emergency Room for further evaluation. According to report, the patient used to have wound VAC in the left foot. The wound VAC was removed because of excessive bleeding. When the patient arrived at the emergency room at [facility], the low hemoglobin was confirmed. Blood transfusion was started. The patient was transferred to [facility] for further evaluation. Not only was the low hemoglobin confirmed at the emergency room at [facility], the patient reported that he was passing dark stool and the stool occult blood was positive, and no GI service [facility].  Blood transfusion was started when the patient arrived at [facility] emergency room. The patient has completed 1 unit of packed red blood cells. He was referred to the hospitalist service for evaluation for admission.   He was recently admitted to [facility].  The patient was admitted and treated for sepsis coming from the left leg wound. Following the treatment, he was discharged to  where the patient presently resides. The patient is not a good historian but denies chest pain, fever, rigors, chills.       Interval history / Subjective:     F/u GI bleed   Hypertensive/tachycardic  Assessment & Plan:     Acute upper GI bleed  -Protonix  -Appreciate GI, diagnostic EGD only 1/21 since the patient is on Plavix:  Esophagus:normal  Stomach:PEG tube seen in body, no other lesions seen  Duodenum/jejunum:Two non bleeding AVMs noted in descending duodenum. Now on mechanical soft diet  -May need inpatient/outpatient EGD/colonoscopy when off Plavix for a week     Acute blood loss anemia  -likely sec to above  -s/p 1 unit PRBC 1/21  -h/h  -transfuse for hb<7    Infected left foot ulcer stage III  ?osteomyelitis  -was admitted to [facility] for this and the patient is presently receiving intravenous antibiotics through the PICC line  -was reported that the patient's wound culture grew Pseudomonas  -Continue Vanc/cefepime/Doxy  -Appreciate Podiatry, concerning for osteo  -Awaiting ID    Hypertension, uncontrolled  -Continue home meds  -May increase the dose of Coreg if BP remains elevated    Hypokalemia  -replace as needed    Dyslipidemia  -stable    Peripheral artery disease  -status post right above-knee amputation  -Antiplatelet therapy medication is on hold until when the patient is cleared by the gastroenterologist.  -Awaiting vascular surgery    Coronary artery disease, status post CABG/Chronic atrial fibrillation  -Continue home meds  -Antiplatelets on hold    S/p PEG tube placement:  The patient has no difficulties in swallowing. The PEG tube was placed so that the patient can receive supplemental feeding.     Mechanical soft diet    PT/OT    Code status: FULL CODE  DVT prophylaxis: No anticoagulation sec to GI bleed  Spoke with patient's daughter Brynn Overton (medical POA) [phone]    Plan: Follow ID, Vascular surgery, GI, Podiatry    Care Plan discussed with: Patient/Family  Anticipated Disposition: TBD  Anticipated Discharge: Greater than 48 hours     Hospital Problems  Date Reviewed: 1/21/2021          Codes Class Noted POA    * (Principal) Acute upper GI bleed ICD-10-CM: K92.2  ICD-9-CM: 578.9  1/21/2021 Yes                Review of Systems:   A comprehensive review of systems was negative except for that written in the HPI. Vital Signs:    Last 24hrs VS reviewed since prior progress note. Most recent are:  Visit Vitals  BP (!) 146/86   Pulse (!) 101   Temp 98.6 °F (37 °C)   Resp 20   SpO2 97%         Intake/Output Summary (Last 24 hours) at 1/22/2021 0351  Last data filed at 1/22/2021 0000  Gross per 24 hour   Intake 500 ml   Output 576 ml   Net -76 ml        Physical Examination:     I had a face to face encounter with this patient and independently examined them on 1/22/2021 as outlined below:          Constitutional:  No acute distress, cooperative, pleasant    ENT:  Oral mucosa moist, oropharynx benign. Resp:  CTA bilaterally. No wheezing/rhonchi/rales. No accessory muscle use   CV:  Regular rhythm, normal rate, no murmurs, gallops, rubs    GI:  Soft, non distended, non tender. normoactive bowel sounds, no hepatosplenomegaly     Musculoskeletal:  No edema, warm, 2+ pulses throughout, right AKA    Neurologic: AAOx3, CN II-XII reviewed     Psych:  Good insight, Not anxious nor agitated.        Data Review:    Review and/or order of clinical lab test      Labs:     Recent Labs     01/22/21  0456 01/21/21  0706   WBC 8.8 8.5   HGB 8.6* 8.4*   HCT 27.6* 27.1*   * 480*     Recent Labs     01/22/21  0456 01/21/21  0902 01/20/21  1905 01/20/21  1832    140  --  139   K 3.3* 3.2*  --  3.9    107  --  104   CO2 24 30  --  28   BUN 10 9  --  10   CREA 0.47* 0.48*  --  0.50*   GLU 63* 90  --  94   CA 9.1 9.1  --  9.1   MG  --  1.8 1.9  --    PHOS  --  2.8  --   --      Recent Labs 01/21/21  0902   ALT 8*   *   TBILI 0.5   TP 7.2   ALB 2.3*   GLOB 4.9*   LPSE 87     Recent Labs     01/20/21  1832   INR 1.2   PTP 14.4      Recent Labs     01/21/21  0902   TIBC 220*   PSAT 19*   FERR 275      Lab Results   Component Value Date/Time    Folate 10.9 01/21/2021 09:02 AM      No results for input(s): PH, PCO2, PO2 in the last 72 hours.   Recent Labs     01/21/21  0902   TROIQ 0.11*     No results found for: CHOL, CHOLX, CHLST, CHOLV, HDL, HDLP, LDL, LDLC, DLDLP, TGLX, TRIGL, TRIGP, CHHD, CHHDX  Lab Results   Component Value Date/Time    Glucose (POC) 121 (H) 12/07/2020 04:18 PM     Lab Results   Component Value Date/Time    Color Yellow/Straw 12/04/2020 08:27 PM    Appearance Clear 12/04/2020 08:27 PM    Specific gravity 1.015 12/04/2020 08:27 PM    pH (UA) 7.0 12/04/2020 08:27 PM    Protein 30 (A) 12/04/2020 08:27 PM    Glucose Negative 12/04/2020 08:27 PM    Ketone Negative 12/04/2020 08:27 PM    Bilirubin Negative 12/04/2020 08:27 PM    Urobilinogen 0.2 12/04/2020 08:27 PM    Nitrites Negative 12/04/2020 08:27 PM    Leukocyte Esterase Negative 12/04/2020 08:27 PM    Epithelial cells Few 12/04/2020 08:27 PM    Bacteria Negative (A) 12/04/2020 08:27 PM    WBC 0-4 12/04/2020 08:27 PM    RBC 0-5 12/04/2020 08:27 PM         Medications Reviewed:     Current Facility-Administered Medications   Medication Dose Route Frequency    atorvastatin (LIPITOR) tablet 40 mg  40 mg Oral DAILY    carvediloL (COREG) tablet 6.25 mg  6.25 mg Oral BID WITH MEALS    ferrous sulfate tablet 325 mg  325 mg Oral ACB    hydrALAZINE (APRESOLINE) tablet 25 mg  25 mg Oral TID    lisinopriL (PRINIVIL, ZESTRIL) tablet 5 mg  5 mg Oral DAILY    loratadine (CLARITIN) tablet 10 mg  10 mg Oral DAILY    oxyCODONE-acetaminophen (PERCOCET) 5-325 mg per tablet 1 Tab  1 Tab Oral Q4H PRN    polyethylene glycol (MIRALAX) packet 17 g  17 g Oral DAILY    senna (SENOKOT) tablet 8.6 mg  1 Tab Oral DAILY    sodium chloride (NS) flush 5-40 mL  5-40 mL IntraVENous Q8H    sodium chloride (NS) flush 5-40 mL  5-40 mL IntraVENous PRN    acetaminophen (TYLENOL) tablet 650 mg  650 mg Oral Q6H PRN    Or    acetaminophen (TYLENOL) suppository 650 mg  650 mg Rectal Q6H PRN    promethazine (PHENERGAN) tablet 12.5 mg  12.5 mg Oral Q6H PRN    Or    ondansetron (ZOFRAN) injection 4 mg  4 mg IntraVENous Q6H PRN    lactobac ac& pc-s.therm-b.anim (MELODY Q/RISAQUAD)  1 Cap Oral DAILY    cefepime (MAXIPIME) 2 g in 0.9% sodium chloride (MBP/ADV) 100 mL MBP  2 g IntraVENous Q24H    doxycycline (VIBRAMYCIN) 100 mg in 0.9% sodium chloride (MBP/ADV) 100 mL MBP  100 mg IntraVENous Q12H    pantoprazole (PROTONIX) 40 mg in 0.9% sodium chloride 10 mL injection  40 mg IntraVENous Q12H    vancomycin (VANCOCIN) 500 mg in 0.9% sodium chloride (MBP/ADV) 100 mL MBP  500 mg IntraVENous Q12H    Vancomycin Pharmacy Dosing   Other PRN     Current Outpatient Medications   Medication Sig    therapeutic multivitamin (THERAGRAN) tablet Take 1 Tab by mouth daily.  clopidogreL (Plavix) 75 mg tab Take 75 mg by mouth daily.  oxyCODONE-acetaminophen (Percocet) 5-325 mg per tablet Take 1 Tab by mouth every four (4) hours as needed for Pain.  polyethylene glycol (Miralax) 17 gram packet Take 17 g by mouth every fourty-eight (48) hours.  senna (Senna) 8.6 mg tablet Take 1 Tab by mouth daily.  loratadine (CLARITIN) 10 mg tablet Take 1 Tab by mouth daily.  hydrALAZINE (APRESOLINE) 25 mg tablet Take 1 Tab by mouth three (3) times daily.  atorvastatin (LIPITOR) 40 mg tablet Take 1 Tab by mouth daily.  lisinopriL (PRINIVIL, ZESTRIL) 5 mg tablet Take 1 Tab by mouth daily.  carvediloL (COREG) 6.25 mg tablet Take 1 Tab by mouth two (2) times daily (with meals).  aspirin delayed-release 81 mg tablet Take 1 Tab by mouth daily.  ferrous sulfate 325 mg (65 mg iron) tablet Take 1 Tab by mouth Daily (before breakfast).     Omeprazole delayed release (PRILOSEC D/R) 20 mg tablet Take 20 mg by mouth daily.      ______________________________________________________________________  EXPECTED LENGTH OF STAY: - - -  ACTUAL LENGTH OF STAY:          Saint Boas, MD

## 2021-01-22 NOTE — PROGRESS NOTES
Progress Note    Patient: Rosalino Lance MRN: [id number]  SSN: [id number]    YOB: 1955  Age: 77 y.o. Sex: male      Admit Date: 1/21/2021      Subjective:     Patient seen resting quiet and comfortably and no apparent distress. Relates some pain in the foot today. Objective:     Visit Vitals  BP (!) 151/88 (BP 1 Location: Left arm, BP Patient Position: At rest)   Pulse (!) 102   Temp 97.9 °F (36.6 °C)   Resp 18   Ht 5' 7\" (1.702 m)   SpO2 96%   BMI 13.52 kg/m²        Physical Exam:  Dimensions of the wounds to the dorsum of the left foot and the heel unchanged from yesterday. Cuneiforms visualized with some purulence expressed from the intercuneiform joints, with necrosis to the associated tendons. Additionally some slough and drainage from the calcaneus. Patient notes that the midfoot area is very sensitive to touch    Labs/Radiology Review: images and reports reviewed    Assessment:   PAD of LLE  Ulcers of the dorsal left foot and heel with underlying osteomyelitis, currently being treated with antibiotics    Plan/Recommendations/Medical Decision Making:   -Dressing changed at bedside  -Wound culture taken of drainage expressed from the intercuneiform joints.  -Patient and daughter have previously expressed that they desire to keep the foot, but agree with vascular that the patient may benefit from an eventual left above knee amputation  -Vascular consult reviewed, vascular studies to follow  -ID consult pending  -MRI to assess for full extent of osteomyelitis pending  -Dr. Zaina Kline to round over weekend, our team to continue to follow.

## 2021-01-22 NOTE — PROGRESS NOTES
[address]  [address] [facility] [address], [address]  [phone]                GI PROGRESS NOTE      NAME:   Devi Rodriguez   :    1955   MRN:    [id number]     Assessment/Plan   Heme positive stool/ Anemia:  No over bleeding   -Hemoglobin 6.7 on admission- received 1 unit of PRBC's, Hemoglobin up to 8.5 for 2021  -Continue to monitor H & H, transfuse if hemoglobin less than 7.0  -BID PPI with Protonix   -COVID 19 negative for 2021  -Spoke with daughter, Swati Swan- patient takes Plavix and last dose was on 2021  -EGD: Stomach:PEG tube seen in body, no other lesions seen Duodenum/jejunum:Two non bleeding AVMs noted in descending duodenum  -Consider colonopscopy as inpatient/outpatient once he has been off Plavix for 5-7 days and after resolution of current medical issues. Please call if needed.     CAD/ Atrial Fibrillation/ s/p CVA/ PEG placement/ s/p CABG/ PVD/ left foot wound, Right AKA:  -management per hospitalist  -podiatry consulted  -Chest x ray 2021: No acute process       Will discuss with Dr. Kenny Johansen        Patient Active Problem List   Diagnosis Code    Sepsis ([facility] [address] A41.9    Lactic acid increased E87.2    Hypotension I95.9    Wound, open, foot S91.309A    Hx of CABG Z95.1    CAD (coronary artery disease) I25.10    Hyperlipidemia, mixed E78.2    Depression, major F32.9    HTN (hypertension), benign I10    Tachycardia R00.0    Chronic atrial fibrillation (HCC) I48.20    COPD (chronic obstructive pulmonary disease) ([facility] [address] J44.9    CHF (congestive heart failure) ([facility]) I50.9    CKD (chronic kidney disease) N18.9    Anemia D64.9    Acute upper GI bleed K92.2       Subjective:     Devi Rodriguez is a 77 y.o.  male Patient without complaints. No nausea, no vomiting, no abdominal pain. Tolerating foods. No bowel movements overnight    Objective:     VITALS:   Last 24hrs VS reviewed since prior hospitalist progress note.  Most recent are:  Visit Vitals  BP (!) 146/80   Pulse (!) 105   Temp 97.2 °F (36.2 °C)   Resp 20   SpO2 100%       Intake/Output Summary (Last 24 hours) at 1/22/2021 1205  Last data filed at 1/22/2021 0000  Gross per 24 hour   Intake 400 ml   Output 276 ml   Net 124 ml        PHYSICAL EXAM:  General:          WD, WN. Alert, cooperative, no acute distress    HEENT:           NC, Atraumatic. PERRLA, EOMI. Anicteric sclerae. Lungs:             CTA Bilaterally. No Wheezing/Rhonchi/Rales. Heart:              Regular  rhythm,  No murmur   Abdomen:        Soft, Non distended, Non tender.  +Bowel sounds, PEG tube in place/clamped to left abdomen   Extremities:     Left foot with dressing, right AKA  Neurologic:       Alert and oriented X 3. No acute neurological distress   Psych:             Good insight. Not anxious nor agitated. Lab Data   Recent Results (from the past 12 hour(s))   METABOLIC PANEL, BASIC    Collection Time: 01/22/21  4:56 AM   Result Value Ref Range    Sodium 140 136 - 145 mmol/L    Potassium 3.3 (L) 3.5 - 5.1 mmol/L    Chloride 107 97 - 108 mmol/L    CO2 24 21 - 32 mmol/L    Anion gap 9 5 - 15 mmol/L    Glucose 63 (L) 65 - 100 mg/dL    BUN 10 6 - 20 MG/DL    Creatinine 0.47 (L) 0.70 - 1.30 MG/DL    BUN/Creatinine ratio 21 (H) 12 - 20      GFR est AA >60 >60 ml/min/1.73m2    GFR est non-AA >60 >60 ml/min/1.73m2    Calcium 9.1 8.5 - 10.1 MG/DL   CBC W/O DIFF    Collection Time: 01/22/21  4:56 AM   Result Value Ref Range    WBC 8.8 4.1 - 11.1 K/uL    RBC 2.95 (L) 4.10 - 5.70 M/uL    HGB 8.6 (L) 12.1 - 17.0 g/dL    HCT 27.6 (L) 36.6 - 50.3 %    MCV 93.6 80.0 - 99.0 FL    MCH 29.2 26.0 - 34.0 PG    MCHC 31.2 30.0 - 36.5 g/dL    RDW 22.0 (H) 11.5 - 14.5 %    PLATELET 843 (H) 033 - 400 K/uL    MPV 8.9 8.9 - 12.9 FL    NRBC 0.0 0  WBC    ABSOLUTE NRBC 0.00 0.00 - 0.01 K/uL         Medications: Reviewed  Elmer Florez NP  I have examined the patient.   I have reviewed the chart and agree with the documentation recorded by the NP, including the assessment, treatment plan, and disposition.     Lucía Gomez MD

## 2021-01-22 NOTE — PROGRESS NOTES
Physician Progress Note      PATIENT:               Alis Wynn  CSN #:                  [id number]  :                       1955  ADMIT DATE:       2021 3:33 AM  DISCH DATE:  RESPONDING  PROVIDER #:        Rosa Burrell MD          QUERY TEXT:    Patient admitted with GIB. Per Surgical Consult, noted to have decubitus ulcer on Left heel. If possible, please document in progress notes and discharge summary the stage of the decubitus ulcer: The medical record reflects the following:  Risk Factors: hx of PVD, decreased mobility, chronic ulcers  Clinical Indicators: admitted with chronic ulcer to left foot and heel. Foot Surgeon has described heel ulcer as a Pressure ulcer. Wound 6cm x 8cm x0.3cm. Wound Care Nurse has documented the ulcer as being Stage 4. Treatment: Maxipime/ Doxycycline IV,  wound cleanser, wet Opticel AG with dry dressing, turn/ reposition, offload heels,  Nurse to follow, Foot/ Ankle Surgeon also following. Thank you, if you have any questions please e-mail me at  [email or web address]  Options provided:  -- Stage 4 Decubitus Pressure Ulcer of Left Heel present on admission  -- Stage 3 Decubitus Pressure Ulcer of Left Heel  present on admission  -- Other - I will add my own diagnosis  -- Disagree - Not applicable / Not valid  -- Disagree - Clinically unable to determine / Unknown  -- Refer to Clinical Documentation Reviewer    PROVIDER RESPONSE TEXT:    This patient has a Stage 4 decubitus pressure ulcer of the Left Heel that was present on admission.     Query created by: Tonya Shepard on 2021 12:33 PM      Electronically signed by:  Rosa Burrell MD 2021 2:04 PM

## 2021-01-22 NOTE — ED NOTES
Attempted to follow up on Covid-PCR exam. It appears to be collected in system, but is not marked \"in process. \" Microbiology stated that they would be unable to know if it was in process until after 0830 this morning due to a new testing system. Will pass on to on coming shift for follow up.

## 2021-01-22 NOTE — PROGRESS NOTES
Vascular  Consult dictated  78 y/o admitted with severe GI bleed and hgb of 6.2 from his acute bleed whom we were asked to see for severe PAD and ulceration of the dorsuml and heel of the left foot. He is cared for by DR. Scott in [place] who was planning a skin graft to the left foot next week. He has a healing incision in the medial left leg and the foot is warm. Both wounds are deep but have granulation tissue with exposed bone. Plan wound care, vascular tests and rule out of GI bleeding source and COVID. We will follow with you.

## 2021-01-22 NOTE — CONSULTS
Spoke with patient's daughter Arjun Conner ([address] [place] [address]) [phone]. She notes that patient is currently on IV antibiotics for a bone infection, and that the 6 week course of treatment started at the beginning of the month. She notes that the wounds have been previously managed by her father's vascular doctor Dr. Fernando Villavicencio at [address] [place] [address] who as been trying to save the leg as her father has been stating to her that does want to lose the leg. She notes that the dorsal foot wound has been present for several months, but that the calcaneus wound is more recent. She notes that the patient was scheduled for a debridement and application of a skin graft to both wounds of the left foot with Dr. Fernando Villavicencio on 1/27/21. She notes that previously there had not been any exposed bone, but lately there has been which is concerning to her. She notes that the patient has not had any advanced imaging of the foot. I did discuss that the x-ray shows osseous erosion which she did find concerning as she states that the heel wound is a newer wound. Continue local wound care for now  Will order MRI due to exposed cuneniforms and erosive changes of calcaneus on x-ray  Will consult ID  Will order vascular studies and consult vascular surgery for evaluation.   Order prevalon boot  Continue to follow

## 2021-01-22 NOTE — CONSULTS
[address]    Name:  Josiane Montenegro  MR#:  [id number]  :  1955  ACCOUNT #:  [de-identified]  DATE OF SERVICE:  2021    PODIATRY CONSULT    HISTORY OF PRESENT ILLNESS:  The patient is a 57-year-old male, who was basically transferred to [facility] from [facility] due to a low hemoglobin and a GI bleed. We were consulted for a left foot infection, for which he is currently on IV antibiotics. When I was speaking with the patient, he was only able to give me a limited history. He did confirm what I was able to glean from the patient's medical records and [facility] that he did have a left bypass of the lower extremity by Dr. Mihai Coelho last month, and he added additionally that he is currently on IV antibiotics, though he cannot currently recall the IV antibiotics that he is on. He also added that he was scheduled to have a debridement and a skin graft with Dr. Mihai Coelho next week. He also stated that he does not want to lose his leg under any circumstances, as he previously did have a right above-knee amputation last year. Additionally, with his consent, I called his daughter, Teresa Bryson, who is also his medical power of . She was able to confirm the same information. Additionally, she noted that he started a 6-week course of IV antibiotics at the beginning of the month. She stated that the wounds on her father's leg have been there for a while. She notes that the wounds on the dorsum of the left foot have been there for several months, and longer than she can remember. She relates that the wound of the left heel is much more recent and has only become more recently. She relates that Dr. Mihai Coelho had done a more recent amputation and that he had been planning to do a debridement an application of a skin graft next week on . She related that generally, there had not been any exposed bone over the past several weeks.   They had only noted exposed tendon, but in the past week or two, she had been noting exposed bone on the top of the foot, which she had noticed concerning for her. She relates that typically there has not been any significant drainage from the wound. There had been like a wound VAC knocked off lately by one of the nurses with some bleeding, but there had not been any purulence previously. She also notes that the patient has had x-rays before of the foot, but she does not recall the patient having a CT scan, an MRI, or bone scan ever of the foot before.     PAST MEDICAL HISTORY:  Past Medical History:   Diagnosis Date    [facility] ([facility]     Hypertension     [facility]     PVD [facility]peripheral vascular disease) [facility]      PAST SURGICAL HISTORY:  Past Surgical History:   Procedure Laterality Date    HX ABOVE KNEE AMPUTATION Right     HX ABOVE KNEE AMPUTATION Right     HX HEART CATHETERIZATION         FAMILY HISTORY:  Family History   Problem Relation Age of Onset    Cancer Mother         Ovarian, Cervical    Stroke Father     Cancer Brother        SOCIAL HISTORY:  Social History     Socioeconomic History    Marital status:      Spouse name: Not on file    Number of children: Not on file    Years of education: Not on file    Highest education level: Not on file   Occupational History    Not on file   Social Needs    Financial resource strain: Not on file    Food insecurity     Worry: Not on file     Inability: Not on file    Transportation needs     Medical: Not on file     Non-medical: Not on file   Tobacco Use    Smoking status: Former Smoker    Smokeless tobacco: Never Used   Substance and Sexual Activity    Alcohol use: Not Currently    Drug use: Never    Sexual activity: Not Currently   Lifestyle    Physical activity     Days per week: Not on file     Minutes per session: Not on file    Stress: Not on file   Relationships    Social connections     Talks on phone: Not on file     Gets together: Not on file     Attends [employer] service: Not on file     Active member of club or organization: Not on file     Attends meetings of clubs or organizations: Not on file     Relationship status: Not on file    Intimate partner violence     Fear of current or ex partner: Not on file     Emotionally abused: Not on file     Physically abused: Not on file     Forced sexual activity: Not on file   Other Topics Concern    Not on file   Social History Narrative    Not on file       MEDICATIONS:  No current facility-administered medications on file prior to encounter. Current Outpatient Medications on File Prior to Encounter   Medication Sig Dispense Refill    therapeutic multivitamin (THERAGRAN) tablet Take 1 Tab by mouth daily.  clopidogreL (Plavix) 75 mg tab Take 75 mg by mouth daily.  oxyCODONE-acetaminophen (Percocet) 5-325 mg per tablet Take 1 Tab by mouth every four (4) hours as needed for Pain.  polyethylene glycol (Miralax) 17 gram packet Take 17 g by mouth every fourty-eight (48) hours.  senna (Senna) 8.6 mg tablet Take 1 Tab by mouth daily.  loratadine (CLARITIN) 10 mg tablet Take 1 Tab by mouth daily. 90 Tab 3    hydrALAZINE (APRESOLINE) 25 mg tablet Take 1 Tab by mouth three (3) times daily. 270 Tab 1    atorvastatin (LIPITOR) 40 mg tablet Take 1 Tab by mouth daily. 90 Tab 1    lisinopriL (PRINIVIL, ZESTRIL) 5 mg tablet Take 1 Tab by mouth daily. 90 Tab 1    carvediloL (COREG) 6.25 mg tablet Take 1 Tab by mouth two (2) times daily (with meals). 180 Tab 1    aspirin delayed-release 81 mg tablet Take 1 Tab by mouth daily. 90 Tab 1    ferrous sulfate 325 mg (65 mg iron) tablet Take 1 Tab by mouth Daily (before breakfast). 90 Tab 1    Omeprazole delayed release (PRILOSEC D/R) 20 mg tablet Take 20 mg by mouth daily.          ALLERGIES:  NO KNOWN ALLERGIES    REVIEW OF SYSTEMS:  A complete review of systems was performed with no pertinent findings  except as noted in the history of present illness. PHYSICAL EXAMINATION:  VITAL SIGNS:  Blood pressure 125/103, temperature 98.6, pulse 117. GENERAL:  The patient is awake, alert, and oriented to person, time, and place. RESPIRATORY:  The patient has non-labored breathing on room air. ABDOMEN:  Soft, nontender. MUSCULOSKELETAL:  The patient has a right above-knee amputation. No gross obvious osseous abnormalities to the left lower extremity. VASCULAR:  Nonpalpable pulses to the left lower extremity. NEUROVASCULAR:  Decreased sensation to the left foot. To the left dorsal foot, there is a wound measuring 10.3 x 4.8 x 0.3 cm with a phase that is 60% granular red tissue and 40% devitalized and fibrotic tissue with exposed cuneiforms and exposed joints. There is serous drainage from the wound to the left heel. There is a pressure wound measuring 6 x 8 x 0.3 cm with 75% granular red tissue and 25% fibrotic red tissue, with minimal exudate to the area. LABORATORY DATA:  The patient's pertinent laboratory results reviewed. RADIOLOGICAL STUDIES:  The patient's x-rays reviewed. IMPRESSION:  Peripheral vascular disease, arterial ulcer at the dorsum of the left foot, pressure ulcer of the left heel. RECOMMENDATION:  The patient was evaluated and assessed. I also discussed the patient extensively with the patient's daughter, Heidi Brown. At this point, in order to obtain further information and as Heidi Brown was concerned about her father's worsening wounds, with the development of bone now exposed on the dorsum of the foot as well as the findings of the erosive changes to the calcaneus on x-ray, I will proceed with ordering an MRI of the right foot to assess for the extent of the osteomyelitis of the midfoot and the now possible involvement of the calcaneus.   Additionally, we discussed that I would consult Infectious Disease, since she knows that her father had been diagnosed with a osteomyelitis and has been on an extended period of IV antibiotics. We will obtain vascular studies due to her father's history of a left lower extremity bypass as well as consult Vascular Surgery, and we will proceed from there. I related to her that I do understand her father's desire to save the foot. I think it is important now we do collect as much information right now to gain the full picture of what is going on and proceed from there. Thank you for the consult. We will continue to follow.         OMA [email or web address]  D:  01/21/2021 21:29  T:  01/22/2021 3:08  JOB #:  [id number]

## 2021-01-23 ENCOUNTER — APPOINTMENT (OUTPATIENT)
Dept: MRI IMAGING | Age: 66
DRG: 474 | End: 2021-01-23
Attending: PODIATRIST
Payer: MEDICARE

## 2021-01-23 LAB
ANION GAP SERPL CALC-SCNC: 2 MMOL/L (ref 5–15)
BASOPHILS # BLD: 0 K/UL (ref 0–0.1)
BASOPHILS NFR BLD: 0 % (ref 0–1)
BUN SERPL-MCNC: 12 MG/DL (ref 6–20)
BUN/CREAT SERPL: 17 (ref 12–20)
CALCIUM SERPL-MCNC: 9.2 MG/DL (ref 8.5–10.1)
CHLORIDE SERPL-SCNC: 112 MMOL/L (ref 97–108)
CO2 SERPL-SCNC: 27 MMOL/L (ref 21–32)
CREAT SERPL-MCNC: 0.69 MG/DL (ref 0.7–1.3)
DATE LAST DOSE: ABNORMAL
DIFFERENTIAL METHOD BLD: ABNORMAL
EOSINOPHIL # BLD: 0.1 K/UL (ref 0–0.4)
EOSINOPHIL NFR BLD: 2 % (ref 0–7)
ERYTHROCYTE [DISTWIDTH] IN BLOOD BY AUTOMATED COUNT: 21.5 % (ref 11.5–14.5)
GLUCOSE SERPL-MCNC: 112 MG/DL (ref 65–100)
HCT VFR BLD AUTO: 28.1 % (ref 36.6–50.3)
HGB BLD-MCNC: 8.5 G/DL (ref 12.1–17)
IMM GRANULOCYTES # BLD AUTO: 0 K/UL (ref 0–0.04)
IMM GRANULOCYTES NFR BLD AUTO: 0 % (ref 0–0.5)
LEFT ABI: 0.81
LEFT ANTERIOR TIBIAL: 122 MMHG
LEFT ARM BP: 151 MMHG
LEFT CFA PROX SYS PSV: 114.8 CM/S
LEFT DIST ATA VELOCITY: 237.1 CM/S
LEFT DIST PTA PSV: 43.1 CM/S
LEFT POSTERIOR TIBIAL: 114 MMHG
LEFT PROX ATA VELOCITY: 433.7 CM/S
LEFT PROX PFA A PSV: 65.5 CM/S
LEFT SFA PROX VEL RATIO: 0.61
LEFT SUPER FEMORAL PROX SYS PSV: 69.5 CM/S
LEFT TBI: 0.38
LEFT TOE PRESSURE: 57 MMHG
LYMPHOCYTES # BLD: 1.5 K/UL (ref 0.8–3.5)
LYMPHOCYTES NFR BLD: 22 % (ref 12–49)
MCH RBC QN AUTO: 28.2 PG (ref 26–34)
MCHC RBC AUTO-ENTMCNC: 30.2 G/DL (ref 30–36.5)
MCV RBC AUTO: 93.4 FL (ref 80–99)
MONOCYTES # BLD: 0.9 K/UL (ref 0–1)
MONOCYTES NFR BLD: 13 % (ref 5–13)
NEUTS SEG # BLD: 4.3 K/UL (ref 1.8–8)
NEUTS SEG NFR BLD: 63 % (ref 32–75)
NRBC # BLD: 0 K/UL (ref 0–0.01)
NRBC BLD-RTO: 0 PER 100 WBC
PLATELET # BLD AUTO: 488 K/UL (ref 150–400)
PMV BLD AUTO: 9.2 FL (ref 8.9–12.9)
POTASSIUM SERPL-SCNC: 3.7 MMOL/L (ref 3.5–5.1)
RBC # BLD AUTO: 3.01 M/UL (ref 4.1–5.7)
RBC MORPH BLD: ABNORMAL
REPORTED DOSE,DOSE: ABNORMAL UNITS
REPORTED DOSE/TIME,TMG: ABNORMAL
SODIUM SERPL-SCNC: 141 MMOL/L (ref 136–145)
VANCOMYCIN TROUGH SERPL-MCNC: 4.2 UG/ML (ref 5–10)
WBC # BLD AUTO: 6.8 K/UL (ref 4.1–11.1)

## 2021-01-23 PROCEDURE — 74011250636 HC RX REV CODE- 250/636: Performed by: HOSPITALIST

## 2021-01-23 PROCEDURE — 73720 MRI LWR EXTREMITY W/O&W/DYE: CPT

## 2021-01-23 PROCEDURE — 74011250637 HC RX REV CODE- 250/637: Performed by: INTERNAL MEDICINE

## 2021-01-23 PROCEDURE — C9113 INJ PANTOPRAZOLE SODIUM, VIA: HCPCS | Performed by: INTERNAL MEDICINE

## 2021-01-23 PROCEDURE — 36415 COLL VENOUS BLD VENIPUNCTURE: CPT

## 2021-01-23 PROCEDURE — 80048 BASIC METABOLIC PNL TOTAL CA: CPT

## 2021-01-23 PROCEDURE — 80202 ASSAY OF VANCOMYCIN: CPT

## 2021-01-23 PROCEDURE — A9575 INJ GADOTERATE MEGLUMI 0.1ML: HCPCS | Performed by: HOSPITALIST

## 2021-01-23 PROCEDURE — 74011000258 HC RX REV CODE- 258: Performed by: INTERNAL MEDICINE

## 2021-01-23 PROCEDURE — 65660000000 HC RM CCU STEPDOWN

## 2021-01-23 PROCEDURE — 74011000250 HC RX REV CODE- 250: Performed by: INTERNAL MEDICINE

## 2021-01-23 PROCEDURE — 85025 COMPLETE CBC W/AUTO DIFF WBC: CPT

## 2021-01-23 PROCEDURE — 74011250636 HC RX REV CODE- 250/636: Performed by: INTERNAL MEDICINE

## 2021-01-23 PROCEDURE — 74011250637 HC RX REV CODE- 250/637: Performed by: HOSPITALIST

## 2021-01-23 RX ORDER — GADOTERATE MEGLUMINE 376.9 MG/ML
8 INJECTION INTRAVENOUS
Status: COMPLETED | OUTPATIENT
Start: 2021-01-23 | End: 2021-01-23

## 2021-01-23 RX ORDER — SODIUM CHLORIDE 0.9 % (FLUSH) 0.9 %
10 SYRINGE (ML) INJECTION
Status: COMPLETED | OUTPATIENT
Start: 2021-01-23 | End: 2021-01-23

## 2021-01-23 RX ORDER — METRONIDAZOLE 500 MG/100ML
500 INJECTION, SOLUTION INTRAVENOUS EVERY 12 HOURS
Status: DISCONTINUED | OUTPATIENT
Start: 2021-01-23 | End: 2021-01-26

## 2021-01-23 RX ADMIN — HYDRALAZINE HYDROCHLORIDE 25 MG: 50 TABLET, FILM COATED ORAL at 09:04

## 2021-01-23 RX ADMIN — CARVEDILOL 6.25 MG: 6.25 TABLET, FILM COATED ORAL at 16:39

## 2021-01-23 RX ADMIN — HYDRALAZINE HYDROCHLORIDE 25 MG: 50 TABLET, FILM COATED ORAL at 16:39

## 2021-01-23 RX ADMIN — LORATADINE 10 MG: 10 TABLET ORAL at 09:04

## 2021-01-23 RX ADMIN — SODIUM CHLORIDE 40 MG: 9 INJECTION INTRAMUSCULAR; INTRAVENOUS; SUBCUTANEOUS at 21:54

## 2021-01-23 RX ADMIN — GADOTERATE MEGLUMINE 8 ML: 376.9 INJECTION INTRAVENOUS at 13:00

## 2021-01-23 RX ADMIN — SODIUM CHLORIDE 40 MG: 9 INJECTION INTRAMUSCULAR; INTRAVENOUS; SUBCUTANEOUS at 09:03

## 2021-01-23 RX ADMIN — Medication 10 ML: at 21:55

## 2021-01-23 RX ADMIN — Medication 10 ML: at 16:39

## 2021-01-23 RX ADMIN — DOXYCYCLINE 100 MG: 100 INJECTION, POWDER, LYOPHILIZED, FOR SOLUTION INTRAVENOUS at 09:52

## 2021-01-23 RX ADMIN — Medication 10 ML: at 07:01

## 2021-01-23 RX ADMIN — METRONIDAZOLE 500 MG: 500 INJECTION, SOLUTION INTRAVENOUS at 22:00

## 2021-01-23 RX ADMIN — POLYETHYLENE GLYCOL 3350 17 G: 17 POWDER, FOR SOLUTION ORAL at 09:03

## 2021-01-23 RX ADMIN — LISINOPRIL 10 MG: 10 TABLET ORAL at 09:04

## 2021-01-23 RX ADMIN — Medication 8.6 MG: at 09:03

## 2021-01-23 RX ADMIN — CARVEDILOL 6.25 MG: 6.25 TABLET, FILM COATED ORAL at 09:04

## 2021-01-23 RX ADMIN — Medication 1 CAPSULE: at 09:03

## 2021-01-23 RX ADMIN — Medication 10 ML: at 13:00

## 2021-01-23 RX ADMIN — HYDRALAZINE HYDROCHLORIDE 25 MG: 50 TABLET, FILM COATED ORAL at 21:54

## 2021-01-23 RX ADMIN — METRONIDAZOLE 500 MG: 500 INJECTION, SOLUTION INTRAVENOUS at 11:17

## 2021-01-23 RX ADMIN — ATORVASTATIN CALCIUM 40 MG: 40 TABLET, FILM COATED ORAL at 09:04

## 2021-01-23 RX ADMIN — FERROUS SULFATE TAB 325 MG (65 MG ELEMENTAL FE) 325 MG: 325 (65 FE) TAB at 07:05

## 2021-01-23 RX ADMIN — CEFEPIME 2 G: 2 INJECTION, POWDER, FOR SOLUTION INTRAVENOUS at 09:03

## 2021-01-23 RX ADMIN — VANCOMYCIN HYDROCHLORIDE 500 MG: 500 INJECTION, POWDER, LYOPHILIZED, FOR SOLUTION INTRAVENOUS at 14:08

## 2021-01-23 NOTE — PROGRESS NOTES
Bedside and Verbal shift change report given to SOFI Morales (oncoming nurse) by SOFI Phillips (offgoing nurse). Report included the following information SBAR, Kardex, ED Summary, Procedure Summary, Intake/Output, MAR, Recent Results and Cardiac Rhythm Sinus Tachy.

## 2021-01-23 NOTE — PROGRESS NOTES
[address] [facility]                                                                                          Hospitalist Progress Note  Jamel Jennings MD  Answering service: [phone] from in house phone        Date of Service:  2021  NAME:  Chris Richard  :  1955  MRN:  [id number]      Admission Summary: This is a 49-year-old man with past medical history significant for hypertension; dyslipidemia; peripheral artery disease, status post right above-knee amputation; coronary artery disease, status post CABG; chronic atrial fibrillation; status post CVA; and status post PEG tube placement, who was in his usual state of health until the day of his presentation at the emergency room when it was reported that the patient's hemoglobin was low at the nursing home where the patient presently resides. Hemoglobin was checked at the facility because of his chronic left foot ulcer. The patient was found to have hemoglobin of 6.2. He was sent to [facility] Emergency Room for further evaluation. According to report, the patient used to have wound VAC in the left foot. The wound VAC was removed because of excessive bleeding. When the patient arrived at the emergency room at [facility], the low hemoglobin was confirmed. Blood transfusion was started. The patient was transferred to [facility] for further evaluation. Not only was the low hemoglobin confirmed at the emergency room at [facility], the patient reported that he was passing dark stool and the stool occult blood was positive, and no GI service [facility].  Blood transfusion was started when the patient arrived at [facility] emergency room. The patient has completed 1 unit of packed red blood cells. He was referred to the hospitalist service for evaluation for admission.   He was recently admitted to [facility].  The patient was admitted and treated for sepsis coming from the left leg wound. Following the treatment, he was discharged to  where the patient presently resides. The patient is not a good historian but denies chest pain, fever, rigors, chills.       Interval history / Subjective:     F/u GI bleed   Hypertensive/tachycardic  Assessment & Plan:     Acute upper GI bleed  -Protonix  -Appreciate GI, diagnostic EGD only 1/21 since the patient is on Plavix:  Esophagus:normal  Stomach:PEG tube seen in body, no other lesions seen  Duodenum/jejunum:Two non bleeding AVMs noted in descending duodenum. Now on mechanical soft diet  -May need inpatient/outpatient EGD/colonoscopy when off Plavix for a week     Acute blood loss anemia  -likely sec to above  -s/p 1 unit PRBC 1/21  -h/h  -transfuse for hb<7    Infected left foot ulcer stage III  ?osteomyelitis  -was admitted to [facility] for this and the patient is presently receiving intravenous antibiotics through the PICC line  -was reported that the patient's wound culture grew Pseudomonas  -Continue Vanc/cefepime/Doxy  -Appreciate Podiatry, concerning for osteo  -Awaiting ID    Hypertension, uncontrolled  -Continue home meds  -May increase the dose of Coreg if BP remains elevated    Hypokalemia  -replace as needed    Dyslipidemia  -stable    Peripheral artery disease  -status post right above-knee amputation  -Antiplatelet therapy medication is on hold until when the patient is cleared by the gastroenterologist.  -Awaiting vascular surgery    Coronary artery disease, status post CABG/Chronic atrial fibrillation  -Continue home meds  -Antiplatelets on hold    S/p PEG tube placement:  The patient has no difficulties in swallowing. The PEG tube was placed so that the patient can receive supplemental feeding.     Mechanical soft diet    PT/OT    Code status: FULL CODE  DVT prophylaxis: No anticoagulation sec to GI bleed  Spoke with patient's daughter Benjie Ma (medical POA) [phone]    Plan: Follow ID, Vascular surgery, GI, Podiatry    Care Plan discussed with: Patient/Family  Anticipated Disposition: TBD  Anticipated Discharge: Greater than 48 hours     Hospital Problems  Date Reviewed: 1/21/2021          Codes Class Noted POA    * (Principal) Acute upper GI bleed ICD-10-CM: K92.2  ICD-9-CM: 578.9  1/21/2021 Yes                Review of Systems:   A comprehensive review of systems was negative except for that written in the HPI. Vital Signs:    Last 24hrs VS reviewed since prior progress note. Most recent are:  Visit Vitals  /75 (BP 1 Location: Left arm, BP Patient Position: At rest)   Pulse (!) 103   Temp 97.9 °F (36.6 °C)   Resp 16   Ht 5' 7\" (1.702 m)   Wt 40.4 kg (89 lb 1.1 oz)   SpO2 100%   BMI 13.95 kg/m²       No intake or output data in the 24 hours ending 01/23/21 1016     Physical Examination:     I had a face to face encounter with this patient and independently examined them on 1/23/2021 as outlined below:          Constitutional:  No acute distress, cooperative, pleasant    ENT:  Oral mucosa moist, oropharynx benign. Resp:  CTA bilaterally. No wheezing/rhonchi/rales. No accessory muscle use   CV:  Regular rhythm, normal rate, no murmurs, gallops, rubs    GI:  Soft, non distended, non tender. normoactive bowel sounds, no hepatosplenomegaly     Musculoskeletal:  No edema, warm, 2+ pulses throughout, right AKA    Neurologic: AAOx3, CN II-XII reviewed     Psych:  Good insight, Not anxious nor agitated.        Data Review:    Review and/or order of clinical lab test      Labs:     Recent Labs     01/23/21 0324 01/22/21 0456   WBC 6.8 8.8   HGB 8.5* 8.6*   HCT 28.1* 27.6*   * 531*     Recent Labs     01/23/21  0324 01/22/21  0456 01/21/21  0902 01/20/21  1905    140 140  --    K 3.7 3.3* 3.2*  --    * 107 107  --    CO2 27 24 30  --    BUN 12 10 9  --    CREA 0.69* 0.47* 0.48*  --    * 63* 90  --    CA 9.2 9.1 9.1  --    MG  --   --  1.8 1.9   PHOS  --   -- 2.8  --      Recent Labs     01/21/21  0902   ALT 8*   *   TBILI 0.5   TP 7.2   ALB 2.3*   GLOB 4.9*   LPSE 87     Recent Labs     01/20/21  1832   INR 1.2   PTP 14.4      Recent Labs     01/21/21  0902   TIBC 220*   PSAT 19*   FERR 275      Lab Results   Component Value Date/Time    Folate 10.9 01/21/2021 09:02 AM      No results for input(s): PH, PCO2, PO2 in the last 72 hours. Recent Labs     01/21/21  0902   TROIQ 0.11*     No results found for: CHOL, CHOLX, CHLST, CHOLV, HDL, HDLP, LDL, LDLC, DLDLP, TGLX, TRIGL, TRIGP, CHHD, CHHDX  Lab Results   Component Value Date/Time    Glucose (POC) 121 (H) 12/07/2020 04:18 PM     Lab Results   Component Value Date/Time    Color Yellow/Straw 12/04/2020 08:27 PM    Appearance Clear 12/04/2020 08:27 PM    Specific gravity 1.015 12/04/2020 08:27 PM    pH (UA) 7.0 12/04/2020 08:27 PM    Protein 30 (A) 12/04/2020 08:27 PM    Glucose Negative 12/04/2020 08:27 PM    Ketone Negative 12/04/2020 08:27 PM    Bilirubin Negative 12/04/2020 08:27 PM    Urobilinogen 0.2 12/04/2020 08:27 PM    Nitrites Negative 12/04/2020 08:27 PM    Leukocyte Esterase Negative 12/04/2020 08:27 PM    Epithelial cells Few 12/04/2020 08:27 PM    Bacteria Negative (A) 12/04/2020 08:27 PM    WBC 0-4 12/04/2020 08:27 PM    RBC 0-5 12/04/2020 08:27 PM         Medications Reviewed:     Current Facility-Administered Medications   Medication Dose Route Frequency    hydrALAZINE (APRESOLINE) 20 mg/mL injection 10 mg  10 mg IntraVENous Q6H PRN    lisinopriL (PRINIVIL, ZESTRIL) tablet 10 mg  10 mg Oral DAILY    vancomycin trough 1/23 @ 12:00 - thanks!    Other ONCE    atorvastatin (LIPITOR) tablet 40 mg  40 mg Oral DAILY    carvediloL (COREG) tablet 6.25 mg  6.25 mg Oral BID WITH MEALS    ferrous sulfate tablet 325 mg  325 mg Oral ACB    hydrALAZINE (APRESOLINE) tablet 25 mg  25 mg Oral TID    loratadine (CLARITIN) tablet 10 mg  10 mg Oral DAILY    oxyCODONE-acetaminophen (PERCOCET) 5-325 mg per tablet 1 Tab  1 Tab Oral Q4H PRN    polyethylene glycol (MIRALAX) packet 17 g  17 g Oral DAILY    senna (SENOKOT) tablet 8.6 mg  1 Tab Oral DAILY    sodium chloride (NS) flush 5-40 mL  5-40 mL IntraVENous Q8H    sodium chloride (NS) flush 5-40 mL  5-40 mL IntraVENous PRN    acetaminophen (TYLENOL) tablet 650 mg  650 mg Oral Q6H PRN    Or    acetaminophen (TYLENOL) suppository 650 mg  650 mg Rectal Q6H PRN    promethazine (PHENERGAN) tablet 12.5 mg  12.5 mg Oral Q6H PRN    Or    ondansetron (ZOFRAN) injection 4 mg  4 mg IntraVENous Q6H PRN    lactobac ac& pc-s.therm-b.anim (MELODY Q/RISAQUAD)  1 Cap Oral DAILY    cefepime (MAXIPIME) 2 g in 0.9% sodium chloride (MBP/ADV) 100 mL MBP  2 g IntraVENous Q24H    doxycycline (VIBRAMYCIN) 100 mg in 0.9% sodium chloride (MBP/ADV) 100 mL MBP  100 mg IntraVENous Q12H    pantoprazole (PROTONIX) 40 mg in 0.9% sodium chloride 10 mL injection  40 mg IntraVENous Q12H    vancomycin (VANCOCIN) 500 mg in 0.9% sodium chloride (MBP/ADV) 100 mL MBP  500 mg IntraVENous Q12H    Vancomycin Pharmacy Dosing   Other PRN     ______________________________________________________________________  EXPECTED LENGTH OF STAY: - - -  ACTUAL LENGTH OF STAY:          2                 Humberto Solorio MD

## 2021-01-23 NOTE — PROGRESS NOTES
Clinical Pharmacy Note: Metronidazole Dosing    Please note that the metronidazole dose for Rosalino Lance has been changed to 500 mg q12h per [facility]-approved protocol. Please contact the pharmacy with any questions.     Sonia Rojas

## 2021-01-23 NOTE — PROGRESS NOTES
Progress Note    Patient: Shara Belcher MRN: [id number]  SSN: [id number]    YOB: 1955  Age: 77 y.o. Sex: male      Admit Date: 2021    Subjective:     Patient seen & examined at bedside. Denies any n/v/f/ns/c. States his left heel is sore. Objective:     Visit Vitals  BP (!) 156/81 (BP 1 Location: Left arm, BP Patient Position: At rest)   Pulse 96   Temp 97.6 °F (36.4 °C)   Resp 16   Ht 5' 7\" (1.702 m)   Wt 40.4 kg (89 lb 1.1 oz)   SpO2 100%   BMI 13.95 kg/m²      Left Foot Exam: +full thickness ulcers along dorsum of midfoot and plantar heel. Both wounds probe to bone. Heel wound with fibrogranular base. Dorsal wound with some granular tissue present. No pus expressed from either wound. Labs/Radiology Review: images and reports reviewed    Assessment:     Hospital Problems  Date Reviewed: 2021          Codes Class Noted POA    * (Principal) Acute upper GI bleed ICD-10-CM: K92.2  ICD-9-CM: 578.9  2021 Yes              Plan/Recommendations/Medical Decision Makin.) Continue antibxs (vanco/cefepime/doxy). ID consulted - awaiting assessment. 2.) GÓMEZ 0.81. Duplex shows > 50% stenosis of distal aspect of graft. 3.) MRI pending. If MRI shows extensive osteomyelitis in heel and midfoot, he may ultimately need a more proximal amputation. 4.) New dressing applied. Continue offloading left heel. 5.) Dr. Bernadette Sebastian to reassess on Monday.

## 2021-01-23 NOTE — CONSULTS
ID Consult Note  NAME:  Woody Esposito   :   1955   MRN:   [id number]   Date/Time:  2021 10:16 AM  Subjective:   REASON FOR CONSULT: Osteomyelitis of the left foot      Miladys Perez is a 77 y.o. with a history of hypertension, peripheral artery disease status post right above-knee amputation, dyslipidemia, coronary artery disease, atrial fibrillation. The patient is not really the best historian and could not really tell me his medical history. According to the patient and the admitting note, hemoglobin was checked at his facility as it seems the foot ulcer was bleeding. His hemoglobin was found to be 6.2 so he was sent to [facility].  He apparently had a wound VAC on the right foot wound, but this was discontinued because of bleeding. It was decided to send him to [facility].  He tells me that the wound on his left foot has been there for a while. He told me he thought it was getting better. He could not really tell me whether there was any drainage or not. He did say that he had some left heel pain which was slight, nonradiating. There were no aggravating or alleviating factors. He did not have any fever or chills as far as he can recall. He has been seen by podiatry here at [facility] and they have ordered an MRI to see the extent of the infection. He has also been seen by GI here who performed an EGD where he was found to have nonbleeding AVMs. He is currently on vancomycin cefepime and doxycycline and we are being asked to see him in consult. He has apparently been treated for osteomyelitis with intravenous antibiotics in the recent past.  He actually had a PICC line when he came to [facility]. According to the notes, wound culture from another institution grew out Pseudomonas. A wound culture is pending here.       Past Medical History:   Diagnosis Date    Atrial fibrillation ([facility]     Hypertension     MI (myocardial infarction) ([facility]     PVD (peripheral vascular disease) [facility]    Coronary artery disease  Dyslipidemia    Past Surgical History:   Procedure Laterality Date    HX ABOVE KNEE AMPUTATION Right     HX ABOVE KNEE AMPUTATION Right     HX HEART CATHETERIZATION     Coronary artery bypass graft    Social History     Tobacco Use    Smoking status: Former Smoker    Smokeless tobacco: Never Used   Substance Use Topics    Alcohol use: Not Currently      Family History   Problem Relation Age of Onset    Cancer Mother         Ovarian, Cervical    Stroke Father     Cancer Brother       No Known Allergies   Home Medications:  Prior to Admission Medications   Prescriptions Last Dose Informant Patient Reported? Taking? Omeprazole delayed release (PRILOSEC D/R) 20 mg tablet   Yes No   Sig: Take 20 mg by mouth daily. aspirin delayed-release 81 mg tablet Unknown at Unknown time  No No   Sig: Take 1 Tab by mouth daily. atorvastatin (LIPITOR) 40 mg tablet   No No   Sig: Take 1 Tab by mouth daily. carvediloL (COREG) 6.25 mg tablet   No No   Sig: Take 1 Tab by mouth two (2) times daily (with meals). clopidogreL (Plavix) 75 mg tab Unknown at Unknown time  Yes No   Sig: Take 75 mg by mouth daily. ferrous sulfate 325 mg (65 mg iron) tablet   No No   Sig: Take 1 Tab by mouth Daily (before breakfast). hydrALAZINE (APRESOLINE) 25 mg tablet   No No   Sig: Take 1 Tab by mouth three (3) times daily. lisinopriL (PRINIVIL, ZESTRIL) 5 mg tablet   No No   Sig: Take 1 Tab by mouth daily. loratadine (CLARITIN) 10 mg tablet   No No   Sig: Take 1 Tab by mouth daily. oxyCODONE-acetaminophen (Percocet) 5-325 mg per tablet   Yes No   Sig: Take 1 Tab by mouth every four (4) hours as needed for Pain.   polyethylene glycol (Miralax) 17 gram packet   Yes No   Sig: Take 17 g by mouth every fourty-eight (48) hours. senna (Senna) 8.6 mg tablet   Yes No   Sig: Take 1 Tab by mouth daily. therapeutic multivitamin [facility]) tablet   Yes Yes   Sig: Take 1 Tab by mouth daily. Facility-Administered Medications: None     Hospital medications:  Current Facility-Administered Medications   Medication Dose Route Frequency    hydrALAZINE (APRESOLINE) 20 mg/mL injection 10 mg  10 mg IntraVENous Q6H PRN    lisinopriL (PRINIVIL, ZESTRIL) tablet 10 mg  10 mg Oral DAILY    vancomycin trough 1/23 @ 12:00 - thanks!    Other ONCE    atorvastatin (LIPITOR) tablet 40 mg  40 mg Oral DAILY    carvediloL (COREG) tablet 6.25 mg  6.25 mg Oral BID WITH MEALS    ferrous sulfate tablet 325 mg  325 mg Oral ACB    hydrALAZINE (APRESOLINE) tablet 25 mg  25 mg Oral TID    loratadine (CLARITIN) tablet 10 mg  10 mg Oral DAILY    oxyCODONE-acetaminophen (PERCOCET) 5-325 mg per tablet 1 Tab  1 Tab Oral Q4H PRN    polyethylene glycol (MIRALAX) packet 17 g  17 g Oral DAILY    senna (SENOKOT) tablet 8.6 mg  1 Tab Oral DAILY    sodium chloride (NS) flush 5-40 mL  5-40 mL IntraVENous Q8H    sodium chloride (NS) flush 5-40 mL  5-40 mL IntraVENous PRN    acetaminophen (TYLENOL) tablet 650 mg  650 mg Oral Q6H PRN    Or    acetaminophen (TYLENOL) suppository 650 mg  650 mg Rectal Q6H PRN    promethazine (PHENERGAN) tablet 12.5 mg  12.5 mg Oral Q6H PRN    Or    ondansetron (ZOFRAN) injection 4 mg  4 mg IntraVENous Q6H PRN    lactobac ac& pc-s.therm-b.anim (MELODY Q/RISAQUAD)  1 Cap Oral DAILY    cefepime (MAXIPIME) 2 g in 0.9% sodium chloride (MBP/ADV) 100 mL MBP  2 g IntraVENous Q24H    doxycycline (VIBRAMYCIN) 100 mg in 0.9% sodium chloride (MBP/ADV) 100 mL MBP  100 mg IntraVENous Q12H    pantoprazole (PROTONIX) 40 mg in 0.9% sodium chloride 10 mL injection  40 mg IntraVENous Q12H    vancomycin (VANCOCIN) 500 mg in 0.9% sodium chloride (MBP/ADV) 100 mL MBP  500 mg IntraVENous Q12H    Vancomycin Pharmacy Dosing   Other PRN     REVIEW OF SYSTEMS:        Const:   negative fatigue  Eyes:   negative diplopia or visual changes, negative eye pain  ENT:   negative coryza, negative sore throat  Resp:   negative cough, hemoptysis, dyspnea  Cards:  negative for chest pain  :  negative for frequency, dysuria and hematuria  GI:   Negative for hematemesis and hematochezia  Skin:   negative for rash and pruritus  Heme:   negative for easy bruising and gum/nose bleeding  MS:  negative for myalgias, arthralgias  weakness  Neurolo:  negative for headaches,  Psych:  negative for hallucinations        Objective:   VITALS:    Visit Vitals  /75 (BP 1 Location: Left arm, BP Patient Position: At rest)   Pulse (!) 103   Temp 97.9 °F (36.6 °C)   Resp 16   Ht 5' 7\" (1.702 m)   Wt 40.4 kg (89 lb 1.1 oz)   SpO2 100%   BMI 13.95 kg/m²     Temp (24hrs), Av.8 °F (36.6 °C), Min:97.6 °F (36.4 °C), Max:97.9 °F (36.6 °C)    PHYSICAL EXAM:   General:    Alert, cooperative, no distress, appears stated age. Head:   Normocephalic, without obvious abnormality, atraumatic. Eyes:   Conjunctivae clear, anicteric sclerae. Nose:  Nares normal.   Throat:    Lips and tongue normal.   Neck:  Supple, symmetrical    no carotid bruit and no JVD. :    No CVA tenderness, no funes catheter  Lungs:   Clear to auscultation bilaterally. No Wheezing or Rhonchi. No rales. Heart:   Tachycardic, no murmurs  Abdomen:   Soft, non-tender,not distended. Bowel sounds normal.   Extremities: Right BKA site is clean. He has a wound on the dorsum of his left foot that is partially granulating. He also has a significant left heel ulcer  Skin:     No rashes or lesions.   Not Jaundiced  Lymph: Cervical normal  Neurologic:  no facial asymmetry, tongue midline, handgrip equal  Psychiatric: Affect restricted      LAB DATA REVIEWED:    Recent Results (from the past 48 hour(s))   METABOLIC PANEL, BASIC    Collection Time: 21  4:56 AM   Result Value Ref Range    Sodium 140 136 - 145 mmol/L    Potassium 3.3 (L) 3.5 - 5.1 mmol/L    Chloride 107 97 - 108 mmol/L    CO2 24 21 - 32 mmol/L    Anion gap 9 5 - 15 mmol/L    Glucose 63 (L) 65 - 100 mg/dL    BUN 10 6 - 20 MG/DL    Creatinine 0.47 (L) 0.70 - 1.30 MG/DL    BUN/Creatinine ratio 21 (H) 12 - 20      GFR est AA >60 >60 ml/min/1.73m2    GFR est non-AA >60 >60 ml/min/1.73m2    Calcium 9.1 8.5 - 10.1 MG/DL   CBC W/O DIFF    Collection Time: 01/22/21  4:56 AM   Result Value Ref Range    WBC 8.8 4.1 - 11.1 K/uL    RBC 2.95 (L) 4.10 - 5.70 M/uL    HGB 8.6 (L) 12.1 - 17.0 g/dL    HCT 27.6 (L) 36.6 - 50.3 %    MCV 93.6 80.0 - 99.0 FL    MCH 29.2 26.0 - 34.0 PG    MCHC 31.2 30.0 - 36.5 g/dL    RDW 22.0 (H) 11.5 - 14.5 %    PLATELET 543 (H) 598 - 400 K/uL    MPV 8.9 8.9 - 12.9 FL    NRBC 0.0 0  WBC    ABSOLUTE NRBC 0.00 0.00 - 0.01 K/uL   OCCULT BLOOD, STOOL    Collection Time: 01/22/21  1:46 PM   Result Value Ref Range    Occult blood, stool Positive (A) NEG     DUPLEX LOWER EXT ARTERY LEFT    Collection Time: 01/22/21  2:44 PM   Result Value Ref Range    Left CFA prox sys .8 cm/s    Left Prox PFA A PSV 65.5 cm/s    Left super femoral prox sys PSV 69.5 cm/s    Left Dist PTA PSV 43.1 cm/s    Left Dist BRISA Velocity 237.1 cm/s    Left Prox BRISA Velocity 433.7 cm/s    Left SFA Prox Nawaf Ratio 0.61    ANKLE BRACHIAL INDEX    Collection Time: 01/22/21  4:34 PM   Result Value Ref Range    Left anterior tibial 122 mmHg    Left toe pressure 57 mmHg    Left arm  mmHg    Left posterior tibial 114 mmHg    Left GÓMEZ 0.81     Left TBI 0.38    CULTURE, WOUND W GRAM STAIN    Collection Time: 01/22/21  6:06 PM    Specimen: Foot;  Wound   Result Value Ref Range    Special Requests: NO SPECIAL REQUESTS      GRAM STAIN 1+ WBCS SEEN      GRAM STAIN MODERATE GRAM POSITIVE COCCI IN CLUSTERS      GRAM STAIN RARE GRAM NEGATIVE RODS      Culture result: PENDING    METABOLIC PANEL, BASIC    Collection Time: 01/23/21  3:24 AM   Result Value Ref Range    Sodium 141 136 - 145 mmol/L    Potassium 3.7 3.5 - 5.1 mmol/L    Chloride 112 (H) 97 - 108 mmol/L    CO2 27 21 - 32 mmol/L    Anion gap 2 (L) 5 - 15 mmol/L    Glucose 112 (H) 65 - 100 mg/dL BUN 12 6 - 20 MG/DL    Creatinine 0.69 (L) 0.70 - 1.30 MG/DL    BUN/Creatinine ratio 17 12 - 20      GFR est AA >60 >60 ml/min/1.73m2    GFR est non-AA >60 >60 ml/min/1.73m2    Calcium 9.2 8.5 - 10.1 MG/DL   CBC WITH AUTOMATED DIFF    Collection Time: 01/23/21  3:24 AM   Result Value Ref Range    WBC 6.8 4.1 - 11.1 K/uL    RBC 3.01 (L) 4.10 - 5.70 M/uL    HGB 8.5 (L) 12.1 - 17.0 g/dL    HCT 28.1 (L) 36.6 - 50.3 %    MCV 93.4 80.0 - 99.0 FL    MCH 28.2 26.0 - 34.0 PG    MCHC 30.2 30.0 - 36.5 g/dL    RDW 21.5 (H) 11.5 - 14.5 %    PLATELET 855 (H) 597 - 400 K/uL    MPV 9.2 8.9 - 12.9 FL    NRBC 0.0 0  WBC    ABSOLUTE NRBC 0.00 0.00 - 0.01 K/uL    NEUTROPHILS 63 32 - 75 %    LYMPHOCYTES 22 12 - 49 %    MONOCYTES 13 5 - 13 %    EOSINOPHILS 2 0 - 7 %    BASOPHILS 0 0 - 1 %    IMMATURE GRANULOCYTES 0 0.0 - 0.5 %    ABS. NEUTROPHILS 4.3 1.8 - 8.0 K/UL    ABS. LYMPHOCYTES 1.5 0.8 - 3.5 K/UL    ABS. MONOCYTES 0.9 0.0 - 1.0 K/UL    ABS. EOSINOPHILS 0.1 0.0 - 0.4 K/UL    ABS. BASOPHILS 0.0 0.0 - 0.1 K/UL    ABS. IMM. GRANS. 0.0 0.00 - 0.04 K/UL    DF SMEAR SCANNED      RBC COMMENTS ANISOCYTOSIS  2+             IMPRESSION    #1 left foot infection likely with osteomyelitis    #2 coronary artery disease    #3 peripheral vascular disease    #4 hypertension    #5 atrial fibrillation    #6 dyslipidemia    PLAN    According to the history, he is growing Pseudomonas from an outside culture. Cefepime should hopefully cover this. Our own cultures are pending here. I would continue vancomycin, but I will change doxycycline to Flagyl.     He will have an MRI.                   ___________________________________________________  ID: Elige Gosselin, MD

## 2021-01-23 NOTE — PROGRESS NOTES
Pharmacist Note - Vancomycin Dosing  Therapy day #3  Indication: Infected stage III left foot ulcer  Current regimen: 500 mg IV Q 12 hr    Recent Labs     01/23/21  0324 01/22/21  0456 01/21/21  0902 01/21/21  0706   WBC 6.8 8.8  --  8.5   CREA 0.69* 0.47* 0.48*  --    BUN 12 10 9  --        A Trough Level resulted at 4.2 mcg/mL which was obtained ~26 hrs post-dose (apparently the midnight dose was missed last night - no documentation as to why). The extrapolated \"true\" trough is approximately 12.8 mcg/mL based on the patient's known kinetics. Goal trough: 10 - 15 mcg/mL     Plan: Continue current regimen. Pharmacy will continue to monitor this patient daily for changes in clinical status and renal function.

## 2021-01-24 LAB
ANION GAP SERPL CALC-SCNC: 2 MMOL/L (ref 5–15)
BUN SERPL-MCNC: 11 MG/DL (ref 6–20)
BUN/CREAT SERPL: 19 (ref 12–20)
CALCIUM SERPL-MCNC: 8.6 MG/DL (ref 8.5–10.1)
CHLORIDE SERPL-SCNC: 110 MMOL/L (ref 97–108)
CO2 SERPL-SCNC: 29 MMOL/L (ref 21–32)
CREAT SERPL-MCNC: 0.59 MG/DL (ref 0.7–1.3)
GLUCOSE SERPL-MCNC: 129 MG/DL (ref 65–100)
POTASSIUM SERPL-SCNC: 3.6 MMOL/L (ref 3.5–5.1)
SODIUM SERPL-SCNC: 141 MMOL/L (ref 136–145)

## 2021-01-24 PROCEDURE — C9113 INJ PANTOPRAZOLE SODIUM, VIA: HCPCS | Performed by: INTERNAL MEDICINE

## 2021-01-24 PROCEDURE — 77030018798 HC PMP KT ENTRL FED COVD -A

## 2021-01-24 PROCEDURE — 80048 BASIC METABOLIC PNL TOTAL CA: CPT

## 2021-01-24 PROCEDURE — 74011250636 HC RX REV CODE- 250/636: Performed by: INTERNAL MEDICINE

## 2021-01-24 PROCEDURE — 74011250637 HC RX REV CODE- 250/637: Performed by: INTERNAL MEDICINE

## 2021-01-24 PROCEDURE — 65660000000 HC RM CCU STEPDOWN

## 2021-01-24 PROCEDURE — 36415 COLL VENOUS BLD VENIPUNCTURE: CPT

## 2021-01-24 PROCEDURE — 74011000258 HC RX REV CODE- 258: Performed by: INTERNAL MEDICINE

## 2021-01-24 PROCEDURE — 74011250637 HC RX REV CODE- 250/637: Performed by: HOSPITALIST

## 2021-01-24 PROCEDURE — 74011000250 HC RX REV CODE- 250: Performed by: INTERNAL MEDICINE

## 2021-01-24 RX ADMIN — POLYETHYLENE GLYCOL 3350 17 G: 17 POWDER, FOR SOLUTION ORAL at 08:39

## 2021-01-24 RX ADMIN — Medication 1 CAPSULE: at 08:38

## 2021-01-24 RX ADMIN — FERROUS SULFATE TAB 325 MG (65 MG ELEMENTAL FE) 325 MG: 325 (65 FE) TAB at 07:34

## 2021-01-24 RX ADMIN — LISINOPRIL 10 MG: 10 TABLET ORAL at 08:39

## 2021-01-24 RX ADMIN — VANCOMYCIN HYDROCHLORIDE 500 MG: 500 INJECTION, POWDER, LYOPHILIZED, FOR SOLUTION INTRAVENOUS at 00:06

## 2021-01-24 RX ADMIN — OXYCODONE HYDROCHLORIDE AND ACETAMINOPHEN 1 TABLET: 5; 325 TABLET ORAL at 00:45

## 2021-01-24 RX ADMIN — LORATADINE 10 MG: 10 TABLET ORAL at 08:39

## 2021-01-24 RX ADMIN — HYDRALAZINE HYDROCHLORIDE 25 MG: 50 TABLET, FILM COATED ORAL at 08:38

## 2021-01-24 RX ADMIN — METRONIDAZOLE 500 MG: 500 INJECTION, SOLUTION INTRAVENOUS at 13:27

## 2021-01-24 RX ADMIN — SODIUM CHLORIDE 40 MG: 9 INJECTION INTRAMUSCULAR; INTRAVENOUS; SUBCUTANEOUS at 08:39

## 2021-01-24 RX ADMIN — HYDRALAZINE HYDROCHLORIDE 25 MG: 50 TABLET, FILM COATED ORAL at 17:33

## 2021-01-24 RX ADMIN — Medication 8.6 MG: at 08:38

## 2021-01-24 RX ADMIN — CEFEPIME 2 G: 2 INJECTION, POWDER, FOR SOLUTION INTRAVENOUS at 07:34

## 2021-01-24 RX ADMIN — METRONIDAZOLE 500 MG: 500 INJECTION, SOLUTION INTRAVENOUS at 22:00

## 2021-01-24 RX ADMIN — CARVEDILOL 6.25 MG: 6.25 TABLET, FILM COATED ORAL at 17:33

## 2021-01-24 RX ADMIN — HYDRALAZINE HYDROCHLORIDE 25 MG: 50 TABLET, FILM COATED ORAL at 21:58

## 2021-01-24 RX ADMIN — ATORVASTATIN CALCIUM 40 MG: 40 TABLET, FILM COATED ORAL at 08:38

## 2021-01-24 RX ADMIN — CARVEDILOL 6.25 MG: 6.25 TABLET, FILM COATED ORAL at 08:38

## 2021-01-24 RX ADMIN — SODIUM CHLORIDE 40 MG: 9 INJECTION INTRAMUSCULAR; INTRAVENOUS; SUBCUTANEOUS at 21:58

## 2021-01-24 RX ADMIN — Medication 10 ML: at 17:33

## 2021-01-24 RX ADMIN — VANCOMYCIN HYDROCHLORIDE 500 MG: 500 INJECTION, POWDER, LYOPHILIZED, FOR SOLUTION INTRAVENOUS at 13:00

## 2021-01-24 RX ADMIN — Medication 10 ML: at 22:01

## 2021-01-24 NOTE — PROGRESS NOTES
Problem: Falls - Risk of  Goal: *Absence of Falls  Description: Document Jahaira Alfaro Fall Risk and appropriate interventions in the flowsheet.   Outcome: Progressing Towards Goal  Note: Fall Risk Interventions:  Mobility Interventions: Communicate number of staff needed for ambulation/transfer         Medication Interventions: Evaluate medications/consider consulting pharmacy    Elimination Interventions: Call light in reach

## 2021-01-24 NOTE — PROGRESS NOTES
Bedside and Verbal shift change report given to Darlene (oncoming nurse) by Thecheli Graves (offgoing nurse). Report included the following information SBAR, Kardex, MAR, Accordion and Recent Results.

## 2021-01-24 NOTE — PROGRESS NOTES
Verbal shift change report given to Chaim Valdez RN by [facility], RN. Report included the following information SBAR, Kardex, Intake/Output, MAR and Cardiac Rhythm Sinus Rhythm; Sinus Tach.

## 2021-01-24 NOTE — PROGRESS NOTES
[address] [facility]  Hospitalist Group                                                                                          Hospitalist Progress Note  Humberto Solorio MD  Answering service: [phone] from in house phone        Date of Service:  2021  NAME:  Vladimir Lee  :  1955  MRN:  [id number]      Admission Summary: This is a 47-year-old man with past medical history significant for hypertension; dyslipidemia; peripheral artery disease, status post right above-knee amputation; coronary artery disease, status post CABG; chronic atrial fibrillation; status post CVA; and status post PEG tube placement, who was in his usual state of health until the day of his presentation at the emergency room when it was reported that the patient's hemoglobin was low at the nursing home where the patient presently resides. Hemoglobin was checked at the facility because of his chronic left foot ulcer. The patient was found to have hemoglobin of 6.2. He was sent to [facility] Emergency Room for further evaluation. According to report, the patient used to have wound VAC in the left foot. The wound VAC was removed because of excessive bleeding. When the patient arrived at the emergency room at [facility], the low hemoglobin was confirmed. Blood transfusion was started. The patient was transferred to [facility] for further evaluation. Not only was the low hemoglobin confirmed at the emergency room at [facility], the patient reported that he was passing dark stool and the stool occult blood was positive, and no GI service [facility].  Blood transfusion was started when the patient arrived at [facility] emergency room. The patient has completed 1 unit of packed red blood cells. He was referred to the hospitalist service for evaluation for admission.   He was recently admitted to [facility].  The patient was admitted and treated for sepsis coming from the left leg wound. Following the treatment, he was discharged to  where the patient presently resides. The patient is not a good historian but denies chest pain, fever, rigors, chills.       Interval history / Subjective:     F/u GI bleed   Hypertensive/tachycardic, slightly better  Hb stable  Assessment & Plan:     Acute upper GI bleed  -Protonix  -Appreciate GI, diagnostic EGD only 1/21 since the patient is on Plavix:  Esophagus:normal  Stomach:PEG tube seen in body, no other lesions seen  Duodenum/jejunum:Two non bleeding AVMs noted in descending duodenum. Now on mechanical soft diet  -May need inpatient/outpatient EGD/colonoscopy when off Plavix for a week     Acute blood loss anemia  -likely sec to above  -s/p 1 unit PRBC 1/21  -h/h  -transfuse for hb<7    Infected left foot ulcer stage III  ?osteomyelitis  -was admitted to [facility] for this and the patient is presently receiving intravenous antibiotics through the PICC line  -was reported that the patient's wound culture grew Pseudomonas  -MRI foot 1/23 prelim report suggestive of osteomyelitis  -Continue Vanc/cefepime/Doxy  -Appreciate Podiatry, concerning for osteo  -Appreciate ID    Hypertension, uncontrolled  -Continue home meds  -May increase the dose of Coreg if BP remains elevated    Hypokalemia  -replace as needed    Dyslipidemia  -stable    Peripheral artery disease  -status post right above-knee amputation  -Antiplatelet therapy medication is on hold until when the patient is cleared by the gastroenterologist.  -Appreciate vascular surgery    Coronary artery disease, status post CABG/Chronic atrial fibrillation  -Continue home meds  -Antiplatelets on hold    S/p PEG tube placement:  The patient has no difficulties in swallowing. The PEG tube was placed so that the patient can receive supplemental feeding.     Mechanical soft diet    PT/OT    Code status: FULL CODE  DVT prophylaxis: No anticoagulation sec to GI bleed  Spoke with patient's daughter Victoria Carvalho (medical POA) [phone]    Plan: Follow ID, Vascular surgery, GI, Podiatry    Care Plan discussed with: Patient/Family  Anticipated Disposition: TBD  Anticipated Discharge: Greater than 48 hours     Hospital Problems  Date Reviewed: 1/21/2021          Codes Class Noted POA    * (Principal) Acute upper GI bleed ICD-10-CM: K92.2  ICD-9-CM: 578.9  1/21/2021 Yes                Review of Systems:   A comprehensive review of systems was negative except for that written in the HPI. Vital Signs:    Last 24hrs VS reviewed since prior progress note. Most recent are:  Visit Vitals  /77 (BP 1 Location: Left arm, BP Patient Position: At rest)   Pulse (!) 101   Temp 98.5 °F (36.9 °C)   Resp 18   Ht 5' 7\" (1.702 m)   Wt 44.2 kg (97 lb 7.1 oz)   SpO2 97%   BMI 15.26 kg/m²         Intake/Output Summary (Last 24 hours) at 1/24/2021 1455  Last data filed at 1/23/2021 1800  Gross per 24 hour   Intake 100 ml   Output --   Net 100 ml        Physical Examination:     I had a face to face encounter with this patient and independently examined them on 1/24/2021 as outlined below:          Constitutional:  No acute distress, cooperative, pleasant    ENT:  Oral mucosa moist, oropharynx benign. Resp:  CTA bilaterally. No wheezing/rhonchi/rales. No accessory muscle use   CV:  Regular rhythm, normal rate, no murmurs, gallops, rubs    GI:  Soft, non distended, non tender. normoactive bowel sounds, no hepatosplenomegaly     Musculoskeletal:  No edema, warm, 2+ pulses throughout, right AKA    Neurologic: AAOx3, CN II-XII reviewed     Psych:  Good insight, Not anxious nor agitated.        Data Review:    Review and/or order of clinical lab test      Labs:     Recent Labs     01/23/21 0324 01/22/21 0456   WBC 6.8 8.8   HGB 8.5* 8.6*   HCT 28.1* 27.6*   * 531*     Recent Labs     01/24/21  0330 01/23/21  0324 01/22/21 0456    141 140   K 3.6 3.7 3.3*   * 112* 107   CO2 29 27 24   BUN 11 12 10   CREA 0.59* 0.69* 0.47*   * 112* 63*   CA 8.6 9.2 9.1     No results for input(s): ALT, AP, TBIL, TBILI, TP, ALB, GLOB, GGT, AML, LPSE in the last 72 hours. No lab exists for component: SGOT, GPT, AMYP, HLPSE  No results for input(s): INR, PTP, APTT, INREXT, INREXT in the last 72 hours. No results for input(s): FE, TIBC, PSAT, FERR in the last 72 hours. Lab Results   Component Value Date/Time    Folate 10.9 01/21/2021 09:02 AM      No results for input(s): PH, PCO2, PO2 in the last 72 hours. No results for input(s): CPK, CKNDX, TROIQ in the last 72 hours.     No lab exists for component: CPKMB  No results found for: CHOL, CHOLX, CHLST, CHOLV, HDL, HDLP, LDL, LDLC, DLDLP, TGLX, TRIGL, TRIGP, CHHD, CHHDX  Lab Results   Component Value Date/Time    Glucose (POC) 121 (H) 12/07/2020 04:18 PM     Lab Results   Component Value Date/Time    Color Yellow/Straw 12/04/2020 08:27 PM    Appearance Clear 12/04/2020 08:27 PM    Specific gravity 1.015 12/04/2020 08:27 PM    pH (UA) 7.0 12/04/2020 08:27 PM    Protein 30 (A) 12/04/2020 08:27 PM    Glucose Negative 12/04/2020 08:27 PM    Ketone Negative 12/04/2020 08:27 PM    Bilirubin Negative 12/04/2020 08:27 PM    Urobilinogen 0.2 12/04/2020 08:27 PM    Nitrites Negative 12/04/2020 08:27 PM    Leukocyte Esterase Negative 12/04/2020 08:27 PM    Epithelial cells Few 12/04/2020 08:27 PM    Bacteria Negative (A) 12/04/2020 08:27 PM    WBC 0-4 12/04/2020 08:27 PM    RBC 0-5 12/04/2020 08:27 PM         Medications Reviewed:     Current Facility-Administered Medications   Medication Dose Route Frequency    metroNIDAZOLE (FLAGYL) IVPB premix 500 mg  500 mg IntraVENous Q12H    hydrALAZINE (APRESOLINE) 20 mg/mL injection 10 mg  10 mg IntraVENous Q6H PRN    lisinopriL (PRINIVIL, ZESTRIL) tablet 10 mg  10 mg Oral DAILY    atorvastatin (LIPITOR) tablet 40 mg  40 mg Oral DAILY    carvediloL (COREG) tablet 6.25 mg  6.25 mg Oral BID WITH MEALS  ferrous sulfate tablet 325 mg  325 mg Oral ACB    hydrALAZINE (APRESOLINE) tablet 25 mg  25 mg Oral TID    loratadine (CLARITIN) tablet 10 mg  10 mg Oral DAILY    oxyCODONE-acetaminophen (PERCOCET) 5-325 mg per tablet 1 Tab  1 Tab Oral Q4H PRN    polyethylene glycol (MIRALAX) packet 17 g  17 g Oral DAILY    senna (SENOKOT) tablet 8.6 mg  1 Tab Oral DAILY    sodium chloride (NS) flush 5-40 mL  5-40 mL IntraVENous Q8H    sodium chloride (NS) flush 5-40 mL  5-40 mL IntraVENous PRN    acetaminophen (TYLENOL) tablet 650 mg  650 mg Oral Q6H PRN    Or    acetaminophen (TYLENOL) suppository 650 mg  650 mg Rectal Q6H PRN    promethazine (PHENERGAN) tablet 12.5 mg  12.5 mg Oral Q6H PRN    Or    ondansetron (ZOFRAN) injection 4 mg  4 mg IntraVENous Q6H PRN    lactobac ac& pc-s.therm-b.anim (MELODY Q/RISAQUAD)  1 Cap Oral DAILY    cefepime (MAXIPIME) 2 g in 0.9% sodium chloride (MBP/ADV) 100 mL MBP  2 g IntraVENous Q24H    pantoprazole (PROTONIX) 40 mg in 0.9% sodium chloride 10 mL injection  40 mg IntraVENous Q12H    vancomycin (VANCOCIN) 500 mg in 0.9% sodium chloride (MBP/ADV) 100 mL MBP  500 mg IntraVENous Q12H    Vancomycin Pharmacy Dosing   Other PRN     ______________________________________________________________________  EXPECTED LENGTH OF STAY: - - -  ACTUAL LENGTH OF STAY:          Army Sully MD

## 2021-01-25 LAB
ANION GAP SERPL CALC-SCNC: 3 MMOL/L (ref 5–15)
BACTERIA SPEC CULT: ABNORMAL
BACTERIA SPEC CULT: ABNORMAL
BUN SERPL-MCNC: 10 MG/DL (ref 6–20)
BUN/CREAT SERPL: 18 (ref 12–20)
CALCIUM SERPL-MCNC: 8.6 MG/DL (ref 8.5–10.1)
CHLORIDE SERPL-SCNC: 109 MMOL/L (ref 97–108)
CO2 SERPL-SCNC: 28 MMOL/L (ref 21–32)
CREAT SERPL-MCNC: 0.56 MG/DL (ref 0.7–1.3)
GLUCOSE SERPL-MCNC: 104 MG/DL (ref 65–100)
GRAM STN SPEC: ABNORMAL
POTASSIUM SERPL-SCNC: 3.9 MMOL/L (ref 3.5–5.1)
SERVICE CMNT-IMP: ABNORMAL
SODIUM SERPL-SCNC: 140 MMOL/L (ref 136–145)

## 2021-01-25 PROCEDURE — C9113 INJ PANTOPRAZOLE SODIUM, VIA: HCPCS | Performed by: INTERNAL MEDICINE

## 2021-01-25 PROCEDURE — 77030018798 HC PMP KT ENTRL FED COVD -A

## 2021-01-25 PROCEDURE — 74011000258 HC RX REV CODE- 258: Performed by: INTERNAL MEDICINE

## 2021-01-25 PROCEDURE — 65660000000 HC RM CCU STEPDOWN

## 2021-01-25 PROCEDURE — 74011250636 HC RX REV CODE- 250/636: Performed by: INTERNAL MEDICINE

## 2021-01-25 PROCEDURE — 74011250637 HC RX REV CODE- 250/637: Performed by: INTERNAL MEDICINE

## 2021-01-25 PROCEDURE — 74011000250 HC RX REV CODE- 250: Performed by: INTERNAL MEDICINE

## 2021-01-25 PROCEDURE — 74011250637 HC RX REV CODE- 250/637: Performed by: HOSPITALIST

## 2021-01-25 PROCEDURE — 80048 BASIC METABOLIC PNL TOTAL CA: CPT

## 2021-01-25 PROCEDURE — 36415 COLL VENOUS BLD VENIPUNCTURE: CPT

## 2021-01-25 PROCEDURE — 74011250636 HC RX REV CODE- 250/636: Performed by: HOSPITALIST

## 2021-01-25 RX ADMIN — METRONIDAZOLE 500 MG: 500 INJECTION, SOLUTION INTRAVENOUS at 10:51

## 2021-01-25 RX ADMIN — SODIUM CHLORIDE 40 MG: 9 INJECTION INTRAMUSCULAR; INTRAVENOUS; SUBCUTANEOUS at 21:58

## 2021-01-25 RX ADMIN — HYDRALAZINE HYDROCHLORIDE 25 MG: 50 TABLET, FILM COATED ORAL at 21:59

## 2021-01-25 RX ADMIN — HYDRALAZINE HYDROCHLORIDE 25 MG: 50 TABLET, FILM COATED ORAL at 16:37

## 2021-01-25 RX ADMIN — Medication 10 ML: at 06:42

## 2021-01-25 RX ADMIN — CEFEPIME 2 G: 2 INJECTION, POWDER, FOR SOLUTION INTRAVENOUS at 08:06

## 2021-01-25 RX ADMIN — HYDRALAZINE HYDROCHLORIDE 25 MG: 50 TABLET, FILM COATED ORAL at 08:06

## 2021-01-25 RX ADMIN — SODIUM CHLORIDE 40 MG: 9 INJECTION INTRAMUSCULAR; INTRAVENOUS; SUBCUTANEOUS at 08:06

## 2021-01-25 RX ADMIN — CARVEDILOL 6.25 MG: 6.25 TABLET, FILM COATED ORAL at 16:37

## 2021-01-25 RX ADMIN — Medication 10 ML: at 16:30

## 2021-01-25 RX ADMIN — VANCOMYCIN HYDROCHLORIDE 750 MG: 750 INJECTION, POWDER, LYOPHILIZED, FOR SOLUTION INTRAVENOUS at 12:00

## 2021-01-25 RX ADMIN — METRONIDAZOLE 500 MG: 500 INJECTION, SOLUTION INTRAVENOUS at 22:00

## 2021-01-25 RX ADMIN — CARVEDILOL 6.25 MG: 6.25 TABLET, FILM COATED ORAL at 08:06

## 2021-01-25 RX ADMIN — LISINOPRIL 10 MG: 10 TABLET ORAL at 08:06

## 2021-01-25 RX ADMIN — LORATADINE 10 MG: 10 TABLET ORAL at 08:05

## 2021-01-25 RX ADMIN — VANCOMYCIN HYDROCHLORIDE 500 MG: 500 INJECTION, POWDER, LYOPHILIZED, FOR SOLUTION INTRAVENOUS at 00:07

## 2021-01-25 RX ADMIN — Medication 10 ML: at 22:00

## 2021-01-25 RX ADMIN — ATORVASTATIN CALCIUM 40 MG: 40 TABLET, FILM COATED ORAL at 08:06

## 2021-01-25 RX ADMIN — FERROUS SULFATE TAB 325 MG (65 MG ELEMENTAL FE) 325 MG: 325 (65 FE) TAB at 06:42

## 2021-01-25 RX ADMIN — POLYETHYLENE GLYCOL 3350 17 G: 17 POWDER, FOR SOLUTION ORAL at 08:05

## 2021-01-25 RX ADMIN — Medication 1 CAPSULE: at 08:05

## 2021-01-25 RX ADMIN — Medication 8.6 MG: at 08:05

## 2021-01-25 NOTE — PROGRESS NOTES
Physician Progress Note      PATIENT:               Jackson Terry  CSN #:                  [id number]  :                       1955  ADMIT DATE:       2021 3:33 AM  DISCH DATE:  RESPONDING  PROVIDER #:        ABDULKADIR Nunes MD          QUERY TEXT:    Patient admitted with UGIB, noted to have BMI 13.95, with weight 89 lbs . If possible, please document in progress notes and discharge summary if you are evaluating and /or treating any of the following: The medical record reflects the following:  Risk Factors: hx of CVA, and CKD  Clinical Indicators: weight 89 lbs, with BMI 13.95. Pt with severe fat loss/ muscle mass loss, and has lost 19 lbs/ 18% total body weight over the past 3 months. Pt has had <50% estimated energy requirements for > 1 month. Meeting criteria for Severe Malnutrition per RD assessment. Treatment: Ensure BID, Osmolite tube feeding during the night, MVI, I/O, RD to continue to follow. Thank you, if you have any questions please e-mail me at  [email or web address].  Options provided:  -- Protein calorie malnutrition severe  -- Protein calorie malnutrition unspecified  -- Other - I will add my own diagnosis  -- Disagree - Not applicable / Not valid  -- Disagree - Clinically unable to determine / Unknown  -- Refer to Clinical Documentation Reviewer    PROVIDER RESPONSE TEXT:    This patient has severe protein calorie malnutrition.     Query created by: Connor Youssef on 2021 8:40 AM      Electronically signed by:  Danii Nunes MD 2021 11:30 AM

## 2021-01-25 NOTE — PROGRESS NOTES
NUTRITION         Nutrition Recommendations/Plan:      1. Begin: 100 mg Thiamine, 220 mg zinc sulfate, Chewable MVI BID     2. Tonight:               Begin enteral feeding @ 50 ml/hr and if tolerated x 6 hours increase to 60 ml x 10 hours. 3. Tomorrow:   Begin enteral feeding @ 70 ml/hr and if tolerated x 6 hours increase to 80 ml/hr x 10 hours. This will provide:    800 ml, 1200 calories, 50 grams protein and with flush: 800 ml free fluid. 4.  Monitor labs, lytes ( mag, phos, potassium) for trends as pt is at high risk for refeeding.         Nutrition Assessment:    Pt started on enteral feedings and tolerating well. Taking small amounts from tray and attempting to drink ~ 50% oral supplements BID. Continue to adjust feeding daily until goal reached. If oral intake does not improve, will need to run enteral feedings over longer period of time and advance feeding volume. Malnutrition Assessment:  Malnutrition Status:  Severe malnutrition    Context:  Social/environmental circumstances     Findings of the 6 clinical characteristics of malnutrition:   Energy Intake:  7 - 50% or less est energy requirements for 1 month or longer  Weight Loss:  7.00 - Greater than 10% over 6 months     Body Fat Loss:  7 - Severe body fat loss, Buccal region, Fat overlying ribs, Orbital, Triceps   Muscle Mass Loss:  7 - Severe muscle mass loss, Clavicles (pectoralis &deltoids), Hand (interosseous), Scapula (trapezius), Temples (temporalis)  Fluid Accumulation:  Unable to assess,     Strength:  Not performed         Estimated Daily Nutrient Needs:  Energy (kcal): 6153-1371 kcal/day MSJ 1141 x 1.2-1.4; (wt gain: 9168-1077 kcal/day); Weight Used for Energy Requirements: Current  Protein (g): 85 gm/day ( 1.5 gm/kg IBW);  Weight Used for Protein Requirements: Ideal  Fluid (ml/day): 8353-2201 ml; Method Used for Fluid Requirements:          Wounds:     Rt AKA  Left dorsal foot, stage 4 pressure injury  Left heel stage 4 pressure injury   Xray of left concerning for osteomyelitis      Current Nutrition Therapies:  DIET MECHANICAL SOFT  DIET ONE TIME MESSAGE  DIET ONE TIME MESSAGE  DIET NUTRITIONAL SUPPLEMENTS Breakfast, Lunch; Ensure Enlive  DIET TUBE FEEDING Begin enteral feedings tonight of Osmolite 1.5 40 ml/hr @ 9pm and run until 7 am. Flush with 100 ml water before start of feeding and end of feeding.         Cara Mcdonald RD

## 2021-01-25 NOTE — PROGRESS NOTES
Progress Note    Patient: Isabel Toure MRN: [id number]  SSN: [id number]    YOB: 1955  Age: 77 y.o. Sex: male      Admit Date: 2021    Subjective:     Patient seen & examined at bedside. Denies any n/v/f/ns/c. States his left heel is sore. Objective:     Visit Vitals  /84 (BP 1 Location: Left arm, BP Patient Position: At rest)   Pulse (!) 104   Temp 98.4 °F (36.9 °C)   Resp 20   Ht 5' 7\" (1.702 m)   Wt 39.8 kg (87 lb 11.9 oz)   SpO2 100%   BMI 13.74 kg/m²      Left Foot Exam: +full thickness ulcers along dorsum of midfoot and plantar heel. Plantar heel has exposed calcaneus. Midfoot ulcer has exposed cuneiforms with base of second and third metatarsal.  Heel wound with fibrogranular base. Dorsal wound with some granular and fibrotic tissue present with significant slough covering bones. Labs/Radiology Review: images and reports reviewed    Assessment:     Hospital Problems  Date Reviewed: 2021          Codes Class Noted POA    * (Principal) Acute upper GI bleed ICD-10-CM: K92.2  ICD-9-CM: 578.9  2021 Yes              Plan/Recommendations/Medical Decision Makin.) Continue antibxs vanco/cefepime/flagyl per ID. Patient has previously been on several weeks of IV abx for Pseudomonas as an outpatient prior to admission. Wound culture taken at [address] [place] [address] on Friday has also grown pseudomonas and diphteroids. 2.) GÓMEZ 0.81. Duplex shows > 50% stenosis of distal aspect of graft. Vascular following. Discussed with them that patient and daughter are open to discussing possible left AKA. 3.) MRI shows acute osteomyelitis of posterior calcaneus with diffuse midfoot changes concerning for osteonecrosis. I discussed with patient and his daughter that the osteonecrosis is likely due to the long-term exposure of the bone as well as the chronic infection with pseudomonas.   Additionally on clinical examination, these dorsal bones are yellowed with degenerative changes and covered with significant fibrotic tissue, slough, and biofilm. We also discussed that the patient has developed an acute osteomyelitis of the posterior calcaneus despite being on several weeks of the IV antibiotics. I addressed their desire to save the foot as the patient hopes to use the foot to walk again. I discussed that to successfully combat the infection that we would have to remove the necrotic bones of the midfoot and replaced with a bone graft as well as remove a significant portion of the posterior calcaneus, to the point where I do not believe that he would be able to ambulate properly on that leg. I also am concerned about his nutritional status to be able to heal such a surgery . His daughter Siva Jimenez [facility]) is in agreement as she notes that he has been losing a significant amount of weight and has been barely been healing and he notes that he has been having a hard time healing the wounds that he currently has, and that he has not walked for several months. She notes that she is leaning toward the more proximal amputation/left AKA as she does not want to worry about the infection worsening and her father potentially becoming septic. We discussed that any further debridements from a podiatric standpoint would likely do little to improvement her father's situation, and that I would discuss with vascular surgery. 4.) New dressing applied. Continue offloading left heel. 5.) Will continue to follow.

## 2021-01-25 NOTE — PROGRESS NOTES
Bedside shift change report given to April RN (oncoming nurse) by SAGE Jack (offgoing nurse). Report included the following information SBAR, Kardex, Procedure Summary, MAR and Recent Results.

## 2021-01-25 NOTE — PROGRESS NOTES
[address] [facility] Adult  Hospitalist Group                                                                                          Hospitalist Progress Note  Saurabh Arroyo MD  Answering service: [phone] from in house phone        Date of Service:  2021  NAME:  Eddie Wilhelm  :  1955  MRN:  [id number]      Admission Summary: This is a 24-year-old man with past medical history significant for hypertension; dyslipidemia; peripheral artery disease, status post right above-knee amputation; coronary artery disease, status post CABG; chronic atrial fibrillation; status post CVA; and status post PEG tube placement, who was in his usual state of health until the day of his presentation at the emergency room when it was reported that the patient's hemoglobin was low at the nursing home where the patient presently resides. Hemoglobin was checked at the facility because of his chronic left foot ulcer. The patient was found to have hemoglobin of 6.2. He was sent to [facility] Emergency Room for further evaluation. According to report, the patient used to have wound VAC in the left foot. The wound VAC was removed because of excessive bleeding. When the patient arrived at the emergency room at [facility], the low hemoglobin was confirmed. Blood transfusion was started. The patient was transferred to [facility] for further evaluation. Not only was the low hemoglobin confirmed at the emergency room at [facility], the patient reported that he was passing dark stool and the stool occult blood was positive, and no GI service [facility].  Blood transfusion was started when the patient arrived at [facility] emergency room. The patient has completed 1 unit of packed red blood cells. He was referred to the hospitalist service for evaluation for admission.   He was recently admitted to [facility].  The patient was admitted and treated for sepsis coming from the left leg wound. Following the treatment, he was discharged to  where the patient presently resides. The patient is not a good historian but denies chest pain, fever, rigors, chills.       Interval history / Subjective:     F/u GI bleed   Hypertensive/tachycardic, slightly better  Hb stable  Assessment & Plan:     Acute upper GI bleed  -Protonix  -Appreciate GI, diagnostic EGD only 1/21 since the patient is on Plavix:  Esophagus:normal  Stomach:PEG tube seen in body, no other lesions seen  Duodenum/jejunum:Two non bleeding AVMs noted in descending duodenum. Now on mechanical soft diet  -The patient refused colonoscopy, GI signed off     Acute blood loss anemia  -likely sec to above  -s/p 1 unit PRBC 1/21  -h/h  -transfuse for hb<7    Infected left foot ulcer stage III  ?osteomyelitis  -was admitted to [facility] for this and the patient is presently receiving intravenous antibiotics through the PICC line  -was reported that the patient's wound culture grew Pseudomonas  -MRI foot 1/23  suggestive of osteomyelitis  -Continue Vanc/cefepime/Doxy  -Appreciate ID/Podiatry, awaiting final plan    Hypertension, uncontrolled  -Continue home meds  -May increase the dose of Coreg if BP remains elevated    Hypokalemia  -replace as needed    Dyslipidemia  -stable    Peripheral artery disease  -status post right above-knee amputation  -Antiplatelet therapy medication is on hold until when the patient is cleared by the gastroenterologist.  -Appreciate vascular surgery    Coronary artery disease, status post CABG/Chronic atrial fibrillation  -Continue home meds  -Antiplatelets on hold    S/p PEG tube placement:  The patient has no difficulties in swallowing. The PEG tube was placed so that the patient can receive supplemental feeding.     Mechanical soft diet    PT/OT    Code status: FULL CODE  DVT prophylaxis: No anticoagulation sec to GI bleed  Spoke with patient's daughter Luis Candelaria (medical POA) [phone]    Plan: Follow ID, Podiatry    Care Plan discussed with: Patient/Family  Anticipated Disposition: TBD  Anticipated Discharge: Greater than 48 hours     Hospital Problems  Date Reviewed: 1/21/2021          Codes Class Noted POA    * (Principal) Acute upper GI bleed ICD-10-CM: K92.2  ICD-9-CM: 578.9  1/21/2021 Yes                Review of Systems:   A comprehensive review of systems was negative except for that written in the HPI. Vital Signs:    Last 24hrs VS reviewed since prior progress note. Most recent are:  Visit Vitals  /84 (BP 1 Location: Left arm, BP Patient Position: At rest)   Pulse (!) 104   Temp 98.4 °F (36.9 °C)   Resp 20   Ht 5' 7\" (1.702 m)   Wt 39.8 kg (87 lb 11.9 oz)   SpO2 100%   BMI 13.74 kg/m²         Intake/Output Summary (Last 24 hours) at 1/25/2021 1649  Last data filed at 1/25/2021 1025  Gross per 24 hour   Intake 680 ml   Output 125 ml   Net 555 ml        Physical Examination:     I had a face to face encounter with this patient and independently examined them on 1/25/2021 as outlined below:          Constitutional:  No acute distress, cooperative, pleasant    ENT:  Oral mucosa moist, oropharynx benign. Resp:  CTA bilaterally. No wheezing/rhonchi/rales. No accessory muscle use   CV:  Regular rhythm, normal rate, no murmurs, gallops, rubs    GI:  Soft, non distended, non tender. normoactive bowel sounds, no hepatosplenomegaly     Musculoskeletal:  No edema, warm, 2+ pulses throughout, right AKA    Neurologic: AAOx3, CN II-XII reviewed     Psych:  Good insight, Not anxious nor agitated.        Data Review:    Review and/or order of clinical lab test      Labs:     Recent Labs     01/23/21  0324   WBC 6.8   HGB 8.5*   HCT 28.1*   *     Recent Labs     01/25/21  0420 01/24/21  0330 01/23/21  0324    141 141   K 3.9 3.6 3.7   * 110* 112*   CO2 28 29 27   BUN 10 11 12   CREA 0.56* 0.59* 0.69*   * 129* 112*   CA 8.6 8.6 9.2     No results for input(s): ALT, AP, TBIL, TBILI, TP, ALB, GLOB, GGT, AML, LPSE in the last 72 hours. No lab exists for component: SGOT, GPT, AMYP, HLPSE  No results for input(s): INR, PTP, APTT, INREXT, INREXT in the last 72 hours. No results for input(s): FE, TIBC, PSAT, FERR in the last 72 hours. Lab Results   Component Value Date/Time    Folate 10.9 01/21/2021 09:02 AM      No results for input(s): PH, PCO2, PO2 in the last 72 hours. No results for input(s): CPK, CKNDX, TROIQ in the last 72 hours.     No lab exists for component: CPKMB  No results found for: CHOL, CHOLX, CHLST, CHOLV, HDL, HDLP, LDL, LDLC, DLDLP, TGLX, TRIGL, TRIGP, CHHD, CHHDX  Lab Results   Component Value Date/Time    Glucose (POC) 121 (H) 12/07/2020 04:18 PM     Lab Results   Component Value Date/Time    Color Yellow/Straw 12/04/2020 08:27 PM    Appearance Clear 12/04/2020 08:27 PM    Specific gravity 1.015 12/04/2020 08:27 PM    pH (UA) 7.0 12/04/2020 08:27 PM    Protein 30 (A) 12/04/2020 08:27 PM    Glucose Negative 12/04/2020 08:27 PM    Ketone Negative 12/04/2020 08:27 PM    Bilirubin Negative 12/04/2020 08:27 PM    Urobilinogen 0.2 12/04/2020 08:27 PM    Nitrites Negative 12/04/2020 08:27 PM    Leukocyte Esterase Negative 12/04/2020 08:27 PM    Epithelial cells Few 12/04/2020 08:27 PM    Bacteria Negative (A) 12/04/2020 08:27 PM    WBC 0-4 12/04/2020 08:27 PM    RBC 0-5 12/04/2020 08:27 PM         Medications Reviewed:     Current Facility-Administered Medications   Medication Dose Route Frequency    vancomycin (VANCOCIN) 750 mg in 0.9% sodium chloride 250 mL (VIAL-MATE)  750 mg IntraVENous Q12H    metroNIDAZOLE (FLAGYL) IVPB premix 500 mg  500 mg IntraVENous Q12H    hydrALAZINE (APRESOLINE) 20 mg/mL injection 10 mg  10 mg IntraVENous Q6H PRN    lisinopriL (PRINIVIL, ZESTRIL) tablet 10 mg  10 mg Oral DAILY    atorvastatin (LIPITOR) tablet 40 mg  40 mg Oral DAILY    carvediloL (COREG) tablet 6.25 mg  6.25 mg Oral BID WITH MEALS  ferrous sulfate tablet 325 mg  325 mg Oral ACB    hydrALAZINE (APRESOLINE) tablet 25 mg  25 mg Oral TID    loratadine (CLARITIN) tablet 10 mg  10 mg Oral DAILY    oxyCODONE-acetaminophen (PERCOCET) 5-325 mg per tablet 1 Tab  1 Tab Oral Q4H PRN    polyethylene glycol (MIRALAX) packet 17 g  17 g Oral DAILY    senna (SENOKOT) tablet 8.6 mg  1 Tab Oral DAILY    sodium chloride (NS) flush 5-40 mL  5-40 mL IntraVENous Q8H    sodium chloride (NS) flush 5-40 mL  5-40 mL IntraVENous PRN    acetaminophen (TYLENOL) tablet 650 mg  650 mg Oral Q6H PRN    Or    acetaminophen (TYLENOL) suppository 650 mg  650 mg Rectal Q6H PRN    promethazine (PHENERGAN) tablet 12.5 mg  12.5 mg Oral Q6H PRN    Or    ondansetron (ZOFRAN) injection 4 mg  4 mg IntraVENous Q6H PRN    lactobac ac& pc-s.therm-b.anim (MELODY Q/RISAQUAD)  1 Cap Oral DAILY    pantoprazole (PROTONIX) 40 mg in 0.9% sodium chloride 10 mL injection  40 mg IntraVENous Q12H    Vancomycin Pharmacy Dosing   Other PRN     ______________________________________________________________________  EXPECTED LENGTH OF STAY: 4d 9h  ACTUAL LENGTH OF STAY:          Ed Colmenares MD

## 2021-01-25 NOTE — PROGRESS NOTES
Day #5 of Vancomycin  Indication:  Infected stage III left foot ulcer  Current regimen:  500 mg IV Q 12 hr  Abx regimen:  Cefepime + Flagyl + Vancomycin  ID Following ?: YES  Concomitant nephrotoxic drugs (requires more frequent monitoring): None  Frequency of BMP?: daily through     Recent Labs     21  0420 21  0330 21  0324   WBC  --   --  6.8   CREA 0.56* 0.59* 0.69*   BUN 10 11 12     Est CrCl: ~55-60 ml/min; UO: 0.4 ml/kg/hr  Temp (24hrs), Av.3 °F (36.8 °C), Min:97.6 °F (36.4 °C), Max:98.6 °F (37 °C)    Cultures:    Wound [foot]: moderate GNRs (2 possible strains) + heavy diphtheroids - prelim    Goal trough = 15 - 20 mcg/mL (changed goal due to likely osteo per ID)    Recent trough history (date/time/level/dose/action taken):   @ 1341 = 4.2 mcg/ml (drawn ~26 hrs post-dose - dose missed in between; extrapolated to ~12.8 mcg/ml), no change    Plan: Concern for osteo per ID and primary team. Adjusted trough level from 10-15 to 15-20 mcg/ml. Change to 750 mg IV q12h for an anticipated trough level of ~15 mcg/ml.

## 2021-01-25 NOTE — PROGRESS NOTES
[facility]  [address] [address] [address], [address]  [phone]                     GI PROGRESS NOTE    Patient Name: Sunil Sender      : 1955      MRN: [id number]  Admit Date: 2021  Today's Date: 2021    Subjective:     CONSULT NOTE 21: Patient is a 77 y.o. who is seen in consultation at the request of Dr. Chris Castellanos for GI bleed. Patient is transfer from [facility] for anemia. Occult stool testing positive for blood and hemoglobin found to be 6.7 on admission. Patient given 1 unit of PRBCs. Patient lives in nursing facility where hemoglobin was found to be low and was sent to emergency room for evaluation. Patient transferred to [facility] for need of GI evaluation. Some history taken from patient, patient is poor historian. Charted reviewed and spoke with nurse. Patient with past medical history of atrial fibrillation, CAD, hypertension, dyslipidemia, s/p CABG, CVA, CHF, COPD, CKD, peripheral vascular disease, s/p right AKA anemia, s/p PEG placement. Patient denies nausea, no vomiting, no abdominal pain, no shortness of breath, no dysphagia, no chest pain, and no fever. Patient with immobility, living in nursing facility. He reports he hadn't been told of any blood in his stools. He reports having intermittent loose stools. Patient stated he doesn't recall last use of PEG for tube feeding- stated he has PEG due to loss of appetite and not dysphagia. Stated was placed a few months ago. Stated he has had significant weight loss in the past few months, cant quantify amount. Denies smoking, no alcohol, no NSAID's. Patient denies ever having EGD. Stated colonoscopy many years ago with no findings. Denies colorectal cancer and no polyps. INTERIM:  21:  He had EGD on 21 that revealed two non-bleeding AVMs in the descending duodenum and a PEG tube in situ, otherwise normal. No evidence of active bleeding.   He has not taken his Plavix since 1/19/21, so the plan was for colonoscopy tomorrow, but patient states \"I don't want that. \"  He denies seeing any blood in his stool or black stool, denies nausea, vomiting and abdominal pain. Objective:     Blood pressure (!) 144/82, pulse 94, temperature 98.3 °F (36.8 °C), resp. rate 18, height 5' 7\" (1.702 m), weight 39.8 kg (87 lb 11.9 oz), SpO2 98 %. Physical Exam:  General:          WD, WN. Cooperative, no acute distress    HEENT:           NC, Atraumatic. Anicteric sclerae. Abdomen:        Soft, Non distended, Non tender.  +Bowel sounds, PEG tube in place/clamped to left abdomen   Extremities:     Right AKA  Neurologic:      Alert   Psych:             Not anxious nor agitated. Data Review:    Recent Results (from the past 24 hour(s))   METABOLIC PANEL, BASIC    Collection Time: 01/25/21  4:20 AM   Result Value Ref Range    Sodium 140 136 - 145 mmol/L    Potassium 3.9 3.5 - 5.1 mmol/L    Chloride 109 (H) 97 - 108 mmol/L    CO2 28 21 - 32 mmol/L    Anion gap 3 (L) 5 - 15 mmol/L    Glucose 104 (H) 65 - 100 mg/dL    BUN 10 6 - 20 MG/DL    Creatinine 0.56 (L) 0.70 - 1.30 MG/DL    BUN/Creatinine ratio 18 12 - 20      GFR est AA >60 >60 ml/min/1.73m2    GFR est non-AA >60 >60 ml/min/1.73m2    Calcium 8.6 8.5 - 10.1 MG/DL         Assessment / Plan :     Heme positive stool/ Anemia:  -No overt bleeding   -Hgb stable in mid-8 range for several days  -Continue to monitor H/H, transfuse prn  -Continue BID PPI with Protonix   -EGD 1/21/21 with only two non-bleeding AVMs  -Patient refuses colonoscopy  -At this time, GI is signing off.  Please call if patient changes his mind regarding further endoscopic evaluation or has other issues prior to discharge.   -From GI standpoint, though patient is at a higher risk of GIB with antiplatelet therapy, we defer the final decision to resume Plavix to the hospitalist and/or cardiologist.      CAD/ Atrial Fibrillation; s/p CVA/ PEG placement; s/p CABG; PVD/ left foot wound/ Right AKA    COVID 19 negative 1/21/2021     Patient Active Hospital Problem List:   Principal Problem:    Acute upper GI bleed (1/21/2021)

## 2021-01-25 NOTE — PROGRESS NOTES
TRANSITIONS OF CARE PLAN:   1. DESTINATION: TBD - potential discharge to own home     2. TRANSPORT: Daughter vs BLS  3. ADDITIONAL SUPPORT: Daughter  4. DME: Multiple pieces at home  5. HOME HEALTH: JAYNE with [facility] [place]  6. CODE STATUS/AMD STATUS: Full Code; not on file  7. FOLLOW UP APPOINTMENTS: PCP, Podiatry, Vascular,, GI  8. STILL NEEDS: Potential colonoscopy, IV ABX, IVF, pending podiatry input for potential surgical intervention needs    Reason for Admission:   Acute Upper GI Bleed                  RUR Score:     21%             PCP: First and Last name:  Dr. Zohreh Chambers   Name of Practice: [place] [id number]   Are you a current patient: Yes/No: Yes   Approximate date of last visit: December   Can you participate in a virtual visit if needed:     Do you (patient/family) have any concerns for transition/discharge? Decline in functional status              Plan for utilizing home health:   JAYNE Orders with [facility]    Current Advanced Directive/Advance Care Plan:  Full Code; not on file            Transition of Care Plan:  CM spoke with patient's daughter by phone. Patient has a hx of Rehab at [facility]. Patient is currently open to [address] [facility]. Patient lives with daughter and grandson in a single story home with ramp access. Patient is dependent in ADLs other than feeding self. Daughter, grandson, and granddaughter assist the patient. Daughter identified that she works in a busy medical office and is not always available at home. Pharmacy preference is [facility] in [place], [place]. Patient may need BLS transport. CM notified attending of request for call to the family. Disposition is TBD. Care Management Interventions  PCP Verified by CM: Yes(last seen by Dr. Nirali Turcios in December)  Palliative Care Criteria Met (RRAT>21 & CHF Dx)?: No  Mode of Transport at Discharge:  Other (see comment)(daughter to transport vs BLS)  Transition of Care Consult (CM Consult): Discharge Planning, [address]: No  Reason Outside [place]: Patient already serviced by other home care/hospice agency([facility])  [address]: No  Discharge Durable Medical Equipment: No  Health Maintenance Reviewed: Yes(CM spoke with patient's daughter by phone)  Physical Therapy Consult: Yes  Occupational Therapy Consult: Yes  Speech Therapy Consult: No  Current Support Network: Own Home, Family Lives Nearby(daughter and grandson live with patient)  Confirm Follow Up Transport: Family(dependent in ADLs other than feeding self)  [facility]  Discharge Location  Discharge Placement: Unable to determine at this time(lives with daughter and grandson in a single story home, ramp access)  CRM: Heath Li, MPH, [address] [place] [address]; Z: [phone]

## 2021-01-26 ENCOUNTER — ANESTHESIA EVENT (OUTPATIENT)
Dept: SURGERY | Age: 66
DRG: 474 | End: 2021-01-26
Payer: MEDICARE

## 2021-01-26 LAB
ABO + RH BLD: NORMAL
ANION GAP SERPL CALC-SCNC: 4 MMOL/L (ref 5–15)
BASOPHILS # BLD: 0 K/UL (ref 0–0.1)
BASOPHILS NFR BLD: 0 % (ref 0–1)
BLOOD GROUP ANTIBODIES SERPL: NORMAL
BUN SERPL-MCNC: 8 MG/DL (ref 6–20)
BUN/CREAT SERPL: 14 (ref 12–20)
CALCIUM SERPL-MCNC: 8.9 MG/DL (ref 8.5–10.1)
CHLORIDE SERPL-SCNC: 107 MMOL/L (ref 97–108)
CO2 SERPL-SCNC: 28 MMOL/L (ref 21–32)
CREAT SERPL-MCNC: 0.56 MG/DL (ref 0.7–1.3)
DIFFERENTIAL METHOD BLD: ABNORMAL
EOSINOPHIL # BLD: 0.4 K/UL (ref 0–0.4)
EOSINOPHIL NFR BLD: 4 % (ref 0–7)
ERYTHROCYTE [DISTWIDTH] IN BLOOD BY AUTOMATED COUNT: 20 % (ref 11.5–14.5)
GLUCOSE SERPL-MCNC: 98 MG/DL (ref 65–100)
HCT VFR BLD AUTO: 27 % (ref 36.6–50.3)
HGB BLD-MCNC: 8.2 G/DL (ref 12.1–17)
IMM GRANULOCYTES # BLD AUTO: 0.1 K/UL (ref 0–0.04)
IMM GRANULOCYTES NFR BLD AUTO: 1 % (ref 0–0.5)
LYMPHOCYTES # BLD: 1.7 K/UL (ref 0.8–3.5)
LYMPHOCYTES NFR BLD: 20 % (ref 12–49)
MCH RBC QN AUTO: 28.8 PG (ref 26–34)
MCHC RBC AUTO-ENTMCNC: 30.4 G/DL (ref 30–36.5)
MCV RBC AUTO: 94.7 FL (ref 80–99)
MONOCYTES # BLD: 0.9 K/UL (ref 0–1)
MONOCYTES NFR BLD: 11 % (ref 5–13)
NEUTS SEG # BLD: 5.6 K/UL (ref 1.8–8)
NEUTS SEG NFR BLD: 64 % (ref 32–75)
NRBC # BLD: 0 K/UL (ref 0–0.01)
NRBC BLD-RTO: 0 PER 100 WBC
PLATELET # BLD AUTO: 440 K/UL (ref 150–400)
PMV BLD AUTO: 9.3 FL (ref 8.9–12.9)
POTASSIUM SERPL-SCNC: 3.9 MMOL/L (ref 3.5–5.1)
RBC # BLD AUTO: 2.85 M/UL (ref 4.1–5.7)
SODIUM SERPL-SCNC: 139 MMOL/L (ref 136–145)
SPECIMEN EXP DATE BLD: NORMAL
WBC # BLD AUTO: 8.7 K/UL (ref 4.1–11.1)

## 2021-01-26 PROCEDURE — 74011250637 HC RX REV CODE- 250/637: Performed by: HOSPITALIST

## 2021-01-26 PROCEDURE — C9113 INJ PANTOPRAZOLE SODIUM, VIA: HCPCS | Performed by: INTERNAL MEDICINE

## 2021-01-26 PROCEDURE — 80048 BASIC METABOLIC PNL TOTAL CA: CPT

## 2021-01-26 PROCEDURE — 94760 N-INVAS EAR/PLS OXIMETRY 1: CPT

## 2021-01-26 PROCEDURE — 65660000000 HC RM CCU STEPDOWN

## 2021-01-26 PROCEDURE — 74011000258 HC RX REV CODE- 258: Performed by: PODIATRIST

## 2021-01-26 PROCEDURE — 85025 COMPLETE CBC W/AUTO DIFF WBC: CPT

## 2021-01-26 PROCEDURE — 36415 COLL VENOUS BLD VENIPUNCTURE: CPT

## 2021-01-26 PROCEDURE — 74011250637 HC RX REV CODE- 250/637: Performed by: INTERNAL MEDICINE

## 2021-01-26 PROCEDURE — 74011250636 HC RX REV CODE- 250/636: Performed by: HOSPITALIST

## 2021-01-26 PROCEDURE — 74011000250 HC RX REV CODE- 250: Performed by: INTERNAL MEDICINE

## 2021-01-26 PROCEDURE — 74011250636 HC RX REV CODE- 250/636: Performed by: INTERNAL MEDICINE

## 2021-01-26 PROCEDURE — 74011250636 HC RX REV CODE- 250/636: Performed by: PODIATRIST

## 2021-01-26 PROCEDURE — 86901 BLOOD TYPING SEROLOGIC RH(D): CPT

## 2021-01-26 RX ADMIN — CARVEDILOL 6.25 MG: 6.25 TABLET, FILM COATED ORAL at 08:59

## 2021-01-26 RX ADMIN — Medication 10 ML: at 14:00

## 2021-01-26 RX ADMIN — HYDRALAZINE HYDROCHLORIDE 25 MG: 50 TABLET, FILM COATED ORAL at 16:58

## 2021-01-26 RX ADMIN — Medication 1 CAPSULE: at 08:59

## 2021-01-26 RX ADMIN — Medication 8.6 MG: at 08:59

## 2021-01-26 RX ADMIN — CEFEPIME 2 G: 2 INJECTION, POWDER, FOR SOLUTION INTRAVENOUS at 10:05

## 2021-01-26 RX ADMIN — CARVEDILOL 6.25 MG: 6.25 TABLET, FILM COATED ORAL at 17:12

## 2021-01-26 RX ADMIN — Medication 10 ML: at 06:30

## 2021-01-26 RX ADMIN — POLYETHYLENE GLYCOL 3350 17 G: 17 POWDER, FOR SOLUTION ORAL at 08:57

## 2021-01-26 RX ADMIN — VANCOMYCIN HYDROCHLORIDE 750 MG: 750 INJECTION, POWDER, LYOPHILIZED, FOR SOLUTION INTRAVENOUS at 01:30

## 2021-01-26 RX ADMIN — ATORVASTATIN CALCIUM 40 MG: 40 TABLET, FILM COATED ORAL at 08:59

## 2021-01-26 RX ADMIN — LORATADINE 10 MG: 10 TABLET ORAL at 08:59

## 2021-01-26 RX ADMIN — LISINOPRIL 10 MG: 10 TABLET ORAL at 08:59

## 2021-01-26 RX ADMIN — FERROUS SULFATE TAB 325 MG (65 MG ELEMENTAL FE) 325 MG: 325 (65 FE) TAB at 06:30

## 2021-01-26 RX ADMIN — HYDRALAZINE HYDROCHLORIDE 25 MG: 50 TABLET, FILM COATED ORAL at 21:48

## 2021-01-26 RX ADMIN — HYDRALAZINE HYDROCHLORIDE 25 MG: 50 TABLET, FILM COATED ORAL at 08:59

## 2021-01-26 RX ADMIN — SODIUM CHLORIDE 40 MG: 9 INJECTION INTRAMUSCULAR; INTRAVENOUS; SUBCUTANEOUS at 21:48

## 2021-01-26 RX ADMIN — SODIUM CHLORIDE 40 MG: 9 INJECTION INTRAMUSCULAR; INTRAVENOUS; SUBCUTANEOUS at 10:01

## 2021-01-26 RX ADMIN — Medication 10 ML: at 21:49

## 2021-01-26 NOTE — PROGRESS NOTES
ID Progress Note  2021    Subjective:   Afebrile. No dyspnea, abdominal pain, headache or sore throat    ROS: No anaphylaxis, seizures, syncope, hematemesis, hematochezia     Objective:     Vitals:   Visit Vitals  BP (!) 142/77 (BP 1 Location: Left arm, BP Patient Position: At rest)   Pulse (!) 111   Temp 98.2 °F (36.8 °C)   Resp 18   Ht 5' 7\" (1.702 m)   Wt 39.8 kg (87 lb 11.9 oz)   SpO2 98%   BMI 13.74 kg/m²        Tmax:  Temp (24hrs), Av °F (36.7 °C), Min:97.5 °F (36.4 °C), Max:98.4 °F (36.9 °C)      Exam:    Not in distress  Pink conjunctivae, anicteric sclerae  No cervical lymphdenopathy   Lung clear, no rales, wheezes or rhonchi   Heart: s1, s2, RRR, no murmurs rubs or clicks  Abdomen: soft nontender, no guarding or rebound  Right BKA  Speech fluent   Labs:   Lab Results   Component Value Date/Time    WBC 8.7 2021 04:02 AM    HGB 8.2 (L) 2021 04:02 AM    HCT 27.0 (L) 2021 04:02 AM    PLATELET 508 (H) 07/10/9563 04:02 AM    MCV 94.7 2021 04:02 AM     Lab Results   Component Value Date/Time    Sodium 139 2021 04:02 AM    Potassium 3.9 2021 04:02 AM    Chloride 107 2021 04:02 AM    CO2 28 2021 04:02 AM    Anion gap 4 (L) 2021 04:02 AM    Glucose 98 2021 04:02 AM    BUN 8 2021 04:02 AM    Creatinine 0.56 (L) 2021 04:02 AM    BUN/Creatinine ratio 14 2021 04:02 AM    GFR est AA >60 2021 04:02 AM    GFR est non-AA >60 2021 04:02 AM    Calcium 8.9 2021 04:02 AM    Bilirubin, total 0.5 2021 09:02 AM    Alk.  phosphatase 121 (H) 2021 09:02 AM    Protein, total 7.2 2021 09:02 AM    Albumin 2.3 (L) 2021 09:02 AM    Globulin 4.9 (H) 2021 09:02 AM    A-G Ratio 0.5 (L) 2021 09:02 AM    ALT (SGPT) 8 (L) 2021 09:02 AM           Assessment:     #1 left foot infection  with osteomyelitis     #2 coronary artery disease     #3 peripheral vascular disease     #4 hypertension     #5 atrial fibrillation     #6 dyslipidemia    Recommendations:     MRI results noted. I agree he may be served better with a proximal amputation. He last received cefepime yesterday and it has fallen off the list. I will reorder this.  Discontinue neeta and yung Holden MD

## 2021-01-26 NOTE — PROGRESS NOTES
1/26/2021 -   RYDER:  - RUR: 21%  - Disposition is TBD dependent on progression: potential discharge to own home with transport via family  - Patient will need JAYNE Orders for [address]  - Patient will need IM Letter prior to discharge    - ABX continue  - Vascular Input is pending re: potential left AKA  - Patient will need to continue working with PT and OT  CRM: Fernando Boland, MPH, [address] [place] [address]; Z: [phone]

## 2021-01-26 NOTE — PROGRESS NOTES
Bedside   shift change report given to SOFI Jones (oncoming nurse) by Deysi Emerson (offgoing nurse). Report included the following information SBAR, Kardex, ED Summary, Intake/Output, MAR, Recent Results, Med Rec Status and Cardiac Rhythm afib.

## 2021-01-27 ENCOUNTER — ANESTHESIA (OUTPATIENT)
Dept: SURGERY | Age: 66
DRG: 474 | End: 2021-01-27
Payer: MEDICARE

## 2021-01-27 LAB
ANION GAP SERPL CALC-SCNC: 4 MMOL/L (ref 5–15)
BUN SERPL-MCNC: 9 MG/DL (ref 6–20)
BUN/CREAT SERPL: 15 (ref 12–20)
CALCIUM SERPL-MCNC: 9.2 MG/DL (ref 8.5–10.1)
CHLORIDE SERPL-SCNC: 107 MMOL/L (ref 97–108)
CO2 SERPL-SCNC: 30 MMOL/L (ref 21–32)
CREAT SERPL-MCNC: 0.6 MG/DL (ref 0.7–1.3)
GLUCOSE SERPL-MCNC: 91 MG/DL (ref 65–100)
POTASSIUM SERPL-SCNC: 3.8 MMOL/L (ref 3.5–5.1)
SODIUM SERPL-SCNC: 141 MMOL/L (ref 136–145)

## 2021-01-27 PROCEDURE — 74011250636 HC RX REV CODE- 250/636: Performed by: NURSE ANESTHETIST, CERTIFIED REGISTERED

## 2021-01-27 PROCEDURE — 74011000250 HC RX REV CODE- 250: Performed by: INTERNAL MEDICINE

## 2021-01-27 PROCEDURE — 74011000250 HC RX REV CODE- 250: Performed by: NURSE ANESTHETIST, CERTIFIED REGISTERED

## 2021-01-27 PROCEDURE — 0Y6D0Z3 DETACHMENT AT LEFT UPPER LEG, LOW, OPEN APPROACH: ICD-10-PCS | Performed by: SURGERY

## 2021-01-27 PROCEDURE — 74011000258 HC RX REV CODE- 258: Performed by: PODIATRIST

## 2021-01-27 PROCEDURE — 76060000033 HC ANESTHESIA 1 TO 1.5 HR: Performed by: SURGERY

## 2021-01-27 PROCEDURE — 77030025655 HC BLD SAW GIGLI BIOM -B: Performed by: SURGERY

## 2021-01-27 PROCEDURE — 74011250636 HC RX REV CODE- 250/636: Performed by: ANESTHESIOLOGY

## 2021-01-27 PROCEDURE — 77030002996 HC SUT SLK J&J -A: Performed by: SURGERY

## 2021-01-27 PROCEDURE — 74011250636 HC RX REV CODE- 250/636: Performed by: INTERNAL MEDICINE

## 2021-01-27 PROCEDURE — 74011250637 HC RX REV CODE- 250/637: Performed by: INTERNAL MEDICINE

## 2021-01-27 PROCEDURE — C9113 INJ PANTOPRAZOLE SODIUM, VIA: HCPCS | Performed by: INTERNAL MEDICINE

## 2021-01-27 PROCEDURE — 76210000016 HC OR PH I REC 1 TO 1.5 HR: Performed by: SURGERY

## 2021-01-27 PROCEDURE — 77030026438 HC STYL ET INTUB CARD -A: Performed by: ANESTHESIOLOGY

## 2021-01-27 PROCEDURE — 74011250636 HC RX REV CODE- 250/636: Performed by: PODIATRIST

## 2021-01-27 PROCEDURE — 36415 COLL VENOUS BLD VENIPUNCTURE: CPT

## 2021-01-27 PROCEDURE — 77030042556 HC PNCL CAUT -B: Performed by: SURGERY

## 2021-01-27 PROCEDURE — 74011000250 HC RX REV CODE- 250: Performed by: SURGERY

## 2021-01-27 PROCEDURE — 88311 DECALCIFY TISSUE: CPT

## 2021-01-27 PROCEDURE — P9045 ALBUMIN (HUMAN), 5%, 250 ML: HCPCS | Performed by: NURSE ANESTHETIST, CERTIFIED REGISTERED

## 2021-01-27 PROCEDURE — 74011250637 HC RX REV CODE- 250/637: Performed by: ANESTHESIOLOGY

## 2021-01-27 PROCEDURE — 77030008684 HC TU ET CUF COVD -B: Performed by: ANESTHESIOLOGY

## 2021-01-27 PROCEDURE — 74011000250 HC RX REV CODE- 250: Performed by: ANESTHESIOLOGY

## 2021-01-27 PROCEDURE — 88307 TISSUE EXAM BY PATHOLOGIST: CPT

## 2021-01-27 PROCEDURE — 76010000149 HC OR TIME 1 TO 1.5 HR: Performed by: SURGERY

## 2021-01-27 PROCEDURE — 65660000000 HC RM CCU STEPDOWN

## 2021-01-27 PROCEDURE — 77030008462 HC STPLR SKN PROX J&J -A: Performed by: SURGERY

## 2021-01-27 PROCEDURE — 74011250636 HC RX REV CODE- 250/636: Performed by: SURGERY

## 2021-01-27 PROCEDURE — 2709999900 HC NON-CHARGEABLE SUPPLY: Performed by: SURGERY

## 2021-01-27 PROCEDURE — 85018 HEMOGLOBIN: CPT

## 2021-01-27 PROCEDURE — 74011250637 HC RX REV CODE- 250/637: Performed by: HOSPITALIST

## 2021-01-27 PROCEDURE — 80048 BASIC METABOLIC PNL TOTAL CA: CPT

## 2021-01-27 PROCEDURE — 77030040922 HC BLNKT HYPOTHRM STRY -A

## 2021-01-27 RX ORDER — MIDAZOLAM HYDROCHLORIDE 1 MG/ML
0.5 INJECTION, SOLUTION INTRAMUSCULAR; INTRAVENOUS
Status: DISCONTINUED | OUTPATIENT
Start: 2021-01-27 | End: 2021-01-27 | Stop reason: HOSPADM

## 2021-01-27 RX ORDER — HYDROMORPHONE HYDROCHLORIDE 1 MG/ML
0.2 INJECTION, SOLUTION INTRAMUSCULAR; INTRAVENOUS; SUBCUTANEOUS
Status: DISCONTINUED | OUTPATIENT
Start: 2021-01-27 | End: 2021-01-27 | Stop reason: HOSPADM

## 2021-01-27 RX ORDER — MORPHINE SULFATE 10 MG/ML
2 INJECTION, SOLUTION INTRAMUSCULAR; INTRAVENOUS
Status: DISCONTINUED | OUTPATIENT
Start: 2021-01-27 | End: 2021-01-27 | Stop reason: HOSPADM

## 2021-01-27 RX ORDER — FENTANYL CITRATE 50 UG/ML
INJECTION, SOLUTION INTRAMUSCULAR; INTRAVENOUS AS NEEDED
Status: DISCONTINUED | OUTPATIENT
Start: 2021-01-27 | End: 2021-01-27 | Stop reason: HOSPADM

## 2021-01-27 RX ORDER — MIDAZOLAM HYDROCHLORIDE 1 MG/ML
1 INJECTION, SOLUTION INTRAMUSCULAR; INTRAVENOUS AS NEEDED
Status: DISCONTINUED | OUTPATIENT
Start: 2021-01-27 | End: 2021-01-27 | Stop reason: HOSPADM

## 2021-01-27 RX ORDER — ACETAMINOPHEN 325 MG/1
650 TABLET ORAL ONCE
Status: DISCONTINUED | OUTPATIENT
Start: 2021-01-27 | End: 2021-01-27 | Stop reason: HOSPADM

## 2021-01-27 RX ORDER — GLYCOPYRROLATE 0.2 MG/ML
0.2 INJECTION INTRAMUSCULAR; INTRAVENOUS
Status: DISCONTINUED | OUTPATIENT
Start: 2021-01-27 | End: 2021-01-27 | Stop reason: HOSPADM

## 2021-01-27 RX ORDER — FENTANYL CITRATE 50 UG/ML
50 INJECTION, SOLUTION INTRAMUSCULAR; INTRAVENOUS AS NEEDED
Status: DISCONTINUED | OUTPATIENT
Start: 2021-01-27 | End: 2021-01-27 | Stop reason: HOSPADM

## 2021-01-27 RX ORDER — LIDOCAINE HYDROCHLORIDE 10 MG/ML
0.1 INJECTION, SOLUTION EPIDURAL; INFILTRATION; INTRACAUDAL; PERINEURAL AS NEEDED
Status: DISCONTINUED | OUTPATIENT
Start: 2021-01-27 | End: 2021-01-27 | Stop reason: HOSPADM

## 2021-01-27 RX ORDER — HYDROMORPHONE HYDROCHLORIDE 1 MG/ML
0.5 INJECTION, SOLUTION INTRAMUSCULAR; INTRAVENOUS; SUBCUTANEOUS
Status: DISCONTINUED | OUTPATIENT
Start: 2021-01-27 | End: 2021-02-02 | Stop reason: HOSPADM

## 2021-01-27 RX ORDER — GLYCOPYRROLATE 0.2 MG/ML
INJECTION INTRAMUSCULAR; INTRAVENOUS AS NEEDED
Status: DISCONTINUED | OUTPATIENT
Start: 2021-01-27 | End: 2021-01-27 | Stop reason: HOSPADM

## 2021-01-27 RX ORDER — HYDROMORPHONE HYDROCHLORIDE 2 MG/ML
INJECTION, SOLUTION INTRAMUSCULAR; INTRAVENOUS; SUBCUTANEOUS AS NEEDED
Status: DISCONTINUED | OUTPATIENT
Start: 2021-01-27 | End: 2021-01-27 | Stop reason: HOSPADM

## 2021-01-27 RX ORDER — ALBUTEROL SULFATE 0.83 MG/ML
2.5 SOLUTION RESPIRATORY (INHALATION) AS NEEDED
Status: DISCONTINUED | OUTPATIENT
Start: 2021-01-27 | End: 2021-01-27 | Stop reason: HOSPADM

## 2021-01-27 RX ORDER — NEOSTIGMINE METHYLSULFATE 1 MG/ML
INJECTION INTRAVENOUS AS NEEDED
Status: DISCONTINUED | OUTPATIENT
Start: 2021-01-27 | End: 2021-01-27 | Stop reason: HOSPADM

## 2021-01-27 RX ORDER — ONDANSETRON 2 MG/ML
INJECTION INTRAMUSCULAR; INTRAVENOUS AS NEEDED
Status: DISCONTINUED | OUTPATIENT
Start: 2021-01-27 | End: 2021-01-27 | Stop reason: HOSPADM

## 2021-01-27 RX ORDER — SODIUM CHLORIDE, SODIUM LACTATE, POTASSIUM CHLORIDE, CALCIUM CHLORIDE 600; 310; 30; 20 MG/100ML; MG/100ML; MG/100ML; MG/100ML
1000 INJECTION, SOLUTION INTRAVENOUS CONTINUOUS
Status: DISCONTINUED | OUTPATIENT
Start: 2021-01-27 | End: 2021-01-27 | Stop reason: HOSPADM

## 2021-01-27 RX ORDER — ALBUMIN HUMAN 50 G/1000ML
SOLUTION INTRAVENOUS AS NEEDED
Status: DISCONTINUED | OUTPATIENT
Start: 2021-01-27 | End: 2021-01-27 | Stop reason: HOSPADM

## 2021-01-27 RX ORDER — PROPOFOL 10 MG/ML
INJECTION, EMULSION INTRAVENOUS AS NEEDED
Status: DISCONTINUED | OUTPATIENT
Start: 2021-01-27 | End: 2021-01-27 | Stop reason: HOSPADM

## 2021-01-27 RX ORDER — SODIUM CHLORIDE 9 MG/ML
25 INJECTION, SOLUTION INTRAVENOUS CONTINUOUS
Status: DISCONTINUED | OUTPATIENT
Start: 2021-01-27 | End: 2021-01-27 | Stop reason: HOSPADM

## 2021-01-27 RX ORDER — PHENYLEPHRINE HCL IN 0.9% NACL 0.4MG/10ML
SYRINGE (ML) INTRAVENOUS AS NEEDED
Status: DISCONTINUED | OUTPATIENT
Start: 2021-01-27 | End: 2021-01-27 | Stop reason: HOSPADM

## 2021-01-27 RX ORDER — SUCCINYLCHOLINE CHLORIDE 20 MG/ML
INJECTION INTRAMUSCULAR; INTRAVENOUS AS NEEDED
Status: DISCONTINUED | OUTPATIENT
Start: 2021-01-27 | End: 2021-01-27 | Stop reason: HOSPADM

## 2021-01-27 RX ORDER — SODIUM CHLORIDE, SODIUM LACTATE, POTASSIUM CHLORIDE, CALCIUM CHLORIDE 600; 310; 30; 20 MG/100ML; MG/100ML; MG/100ML; MG/100ML
INJECTION, SOLUTION INTRAVENOUS
Status: DISCONTINUED | OUTPATIENT
Start: 2021-01-27 | End: 2021-01-27 | Stop reason: HOSPADM

## 2021-01-27 RX ORDER — LABETALOL HYDROCHLORIDE 5 MG/ML
10 INJECTION, SOLUTION INTRAVENOUS ONCE
Status: COMPLETED | OUTPATIENT
Start: 2021-01-27 | End: 2021-01-27

## 2021-01-27 RX ORDER — ROCURONIUM BROMIDE 10 MG/ML
INJECTION, SOLUTION INTRAVENOUS AS NEEDED
Status: DISCONTINUED | OUTPATIENT
Start: 2021-01-27 | End: 2021-01-27 | Stop reason: HOSPADM

## 2021-01-27 RX ORDER — LABETALOL HYDROCHLORIDE 5 MG/ML
INJECTION, SOLUTION INTRAVENOUS
Status: DISPENSED
Start: 2021-01-27 | End: 2021-01-28

## 2021-01-27 RX ORDER — DIPHENHYDRAMINE HYDROCHLORIDE 50 MG/ML
12.5 INJECTION, SOLUTION INTRAMUSCULAR; INTRAVENOUS AS NEEDED
Status: DISCONTINUED | OUTPATIENT
Start: 2021-01-27 | End: 2021-01-27 | Stop reason: HOSPADM

## 2021-01-27 RX ORDER — DEXAMETHASONE SODIUM PHOSPHATE 4 MG/ML
INJECTION, SOLUTION INTRA-ARTICULAR; INTRALESIONAL; INTRAMUSCULAR; INTRAVENOUS; SOFT TISSUE AS NEEDED
Status: DISCONTINUED | OUTPATIENT
Start: 2021-01-27 | End: 2021-01-27 | Stop reason: HOSPADM

## 2021-01-27 RX ORDER — FENTANYL CITRATE 50 UG/ML
25 INJECTION, SOLUTION INTRAMUSCULAR; INTRAVENOUS
Status: DISCONTINUED | OUTPATIENT
Start: 2021-01-27 | End: 2021-01-27 | Stop reason: HOSPADM

## 2021-01-27 RX ORDER — LIDOCAINE HYDROCHLORIDE 20 MG/ML
INJECTION, SOLUTION EPIDURAL; INFILTRATION; INTRACAUDAL; PERINEURAL AS NEEDED
Status: DISCONTINUED | OUTPATIENT
Start: 2021-01-27 | End: 2021-01-27 | Stop reason: HOSPADM

## 2021-01-27 RX ORDER — ROPIVACAINE HYDROCHLORIDE 5 MG/ML
30 INJECTION, SOLUTION EPIDURAL; INFILTRATION; PERINEURAL AS NEEDED
Status: DISCONTINUED | OUTPATIENT
Start: 2021-01-27 | End: 2021-01-27 | Stop reason: HOSPADM

## 2021-01-27 RX ORDER — DEXMEDETOMIDINE HYDROCHLORIDE 100 UG/ML
INJECTION, SOLUTION INTRAVENOUS AS NEEDED
Status: DISCONTINUED | OUTPATIENT
Start: 2021-01-27 | End: 2021-01-27 | Stop reason: HOSPADM

## 2021-01-27 RX ADMIN — CARVEDILOL 6.25 MG: 6.25 TABLET, FILM COATED ORAL at 16:57

## 2021-01-27 RX ADMIN — LORATADINE 10 MG: 10 TABLET ORAL at 09:01

## 2021-01-27 RX ADMIN — Medication 1 CAPSULE: at 09:01

## 2021-01-27 RX ADMIN — ATORVASTATIN CALCIUM 40 MG: 40 TABLET, FILM COATED ORAL at 09:01

## 2021-01-27 RX ADMIN — FENTANYL CITRATE 25 MCG: 50 INJECTION, SOLUTION INTRAMUSCULAR; INTRAVENOUS at 15:40

## 2021-01-27 RX ADMIN — Medication 80 MCG: at 13:36

## 2021-01-27 RX ADMIN — Medication 10 ML: at 16:58

## 2021-01-27 RX ADMIN — NEOSTIGMINE METHYLSULFATE 3 MG: 1 INJECTION, SOLUTION INTRAVENOUS at 14:31

## 2021-01-27 RX ADMIN — LIDOCAINE HYDROCHLORIDE 100 MG: 20 INJECTION, SOLUTION EPIDURAL; INFILTRATION; INTRACAUDAL; PERINEURAL at 13:36

## 2021-01-27 RX ADMIN — PROPOFOL 50 MG: 10 INJECTION, EMULSION INTRAVENOUS at 13:36

## 2021-01-27 RX ADMIN — DEXMEDETOMIDINE HYDROCHLORIDE 4 MCG: 100 INJECTION, SOLUTION, CONCENTRATE INTRAVENOUS at 14:40

## 2021-01-27 RX ADMIN — DEXMEDETOMIDINE HYDROCHLORIDE 4 MCG: 100 INJECTION, SOLUTION, CONCENTRATE INTRAVENOUS at 14:38

## 2021-01-27 RX ADMIN — HYDROMORPHONE HYDROCHLORIDE 0.5 MG: 2 INJECTION, SOLUTION INTRAMUSCULAR; INTRAVENOUS; SUBCUTANEOUS at 14:53

## 2021-01-27 RX ADMIN — PHENYLEPHRINE HYDROCHLORIDE 40 MCG/MIN: 10 INJECTION INTRAVENOUS at 13:36

## 2021-01-27 RX ADMIN — GLYCOPYRROLATE 0.4 MG: 0.2 INJECTION, SOLUTION INTRAMUSCULAR; INTRAVENOUS at 14:31

## 2021-01-27 RX ADMIN — SODIUM CHLORIDE 40 MG: 9 INJECTION INTRAMUSCULAR; INTRAVENOUS; SUBCUTANEOUS at 09:00

## 2021-01-27 RX ADMIN — ROCURONIUM BROMIDE 10 MG: 10 SOLUTION INTRAVENOUS at 13:36

## 2021-01-27 RX ADMIN — LISINOPRIL 10 MG: 10 TABLET ORAL at 09:01

## 2021-01-27 RX ADMIN — Medication 80 MCG: at 13:42

## 2021-01-27 RX ADMIN — CEFEPIME 2 G: 2 INJECTION, POWDER, FOR SOLUTION INTRAVENOUS at 09:00

## 2021-01-27 RX ADMIN — FENTANYL CITRATE 25 MCG: 50 INJECTION, SOLUTION INTRAMUSCULAR; INTRAVENOUS at 13:36

## 2021-01-27 RX ADMIN — ONDANSETRON HYDROCHLORIDE 4 MG: 2 INJECTION, SOLUTION INTRAMUSCULAR; INTRAVENOUS at 13:57

## 2021-01-27 RX ADMIN — LABETALOL HYDROCHLORIDE 10 MG: 5 INJECTION INTRAVENOUS at 15:35

## 2021-01-27 RX ADMIN — SODIUM CHLORIDE, POTASSIUM CHLORIDE, SODIUM LACTATE AND CALCIUM CHLORIDE 1000 ML: 600; 310; 30; 20 INJECTION, SOLUTION INTRAVENOUS at 12:56

## 2021-01-27 RX ADMIN — CARVEDILOL 6.25 MG: 6.25 TABLET, FILM COATED ORAL at 09:00

## 2021-01-27 RX ADMIN — ALBUMIN (HUMAN) 250 ML: 12.5 INJECTION, SOLUTION INTRAVENOUS at 13:36

## 2021-01-27 RX ADMIN — SODIUM CHLORIDE, POTASSIUM CHLORIDE, SODIUM LACTATE AND CALCIUM CHLORIDE: 600; 310; 30; 20 INJECTION, SOLUTION INTRAVENOUS at 13:31

## 2021-01-27 RX ADMIN — POLYETHYLENE GLYCOL 3350 17 G: 17 POWDER, FOR SOLUTION ORAL at 09:01

## 2021-01-27 RX ADMIN — HYDRALAZINE HYDROCHLORIDE 25 MG: 50 TABLET, FILM COATED ORAL at 21:28

## 2021-01-27 RX ADMIN — HYDROMORPHONE HYDROCHLORIDE 0.5 MG: 2 INJECTION, SOLUTION INTRAMUSCULAR; INTRAVENOUS; SUBCUTANEOUS at 14:40

## 2021-01-27 RX ADMIN — WATER 2 G: 1 INJECTION INTRAMUSCULAR; INTRAVENOUS; SUBCUTANEOUS at 13:49

## 2021-01-27 RX ADMIN — FERROUS SULFATE TAB 325 MG (65 MG ELEMENTAL FE) 325 MG: 325 (65 FE) TAB at 09:00

## 2021-01-27 RX ADMIN — FENTANYL CITRATE 25 MCG: 50 INJECTION, SOLUTION INTRAMUSCULAR; INTRAVENOUS at 15:20

## 2021-01-27 RX ADMIN — FENTANYL CITRATE 75 MCG: 50 INJECTION, SOLUTION INTRAMUSCULAR; INTRAVENOUS at 14:17

## 2021-01-27 RX ADMIN — HYDRALAZINE HYDROCHLORIDE 25 MG: 50 TABLET, FILM COATED ORAL at 16:57

## 2021-01-27 RX ADMIN — HYDRALAZINE HYDROCHLORIDE 25 MG: 50 TABLET, FILM COATED ORAL at 09:01

## 2021-01-27 RX ADMIN — Medication 8.6 MG: at 09:01

## 2021-01-27 RX ADMIN — DEXAMETHASONE SODIUM PHOSPHATE 4 MG: 4 INJECTION, SOLUTION INTRAMUSCULAR; INTRAVENOUS at 13:57

## 2021-01-27 RX ADMIN — SUCCINYLCHOLINE CHLORIDE 100 MG: 20 INJECTION, SOLUTION INTRAMUSCULAR; INTRAVENOUS at 13:36

## 2021-01-27 RX ADMIN — SODIUM CHLORIDE 40 MG: 9 INJECTION INTRAMUSCULAR; INTRAVENOUS; SUBCUTANEOUS at 21:29

## 2021-01-27 RX ADMIN — ROCURONIUM BROMIDE 20 MG: 10 SOLUTION INTRAVENOUS at 13:52

## 2021-01-27 RX ADMIN — Medication 10 ML: at 21:30

## 2021-01-27 RX ADMIN — OXYCODONE HYDROCHLORIDE AND ACETAMINOPHEN 1 TABLET: 5; 325 TABLET ORAL at 21:28

## 2021-01-27 NOTE — ANESTHESIA PREPROCEDURE EVALUATION
Relevant Problems   No relevant active problems       Anesthetic History   No history of anesthetic complications            Review of Systems / Medical History  Patient summary reviewed, nursing notes reviewed and pertinent labs reviewed    Pulmonary  Within defined limits  COPD               Neuro/Psych   Within defined limits      Psychiatric history     Cardiovascular  Within defined limits  Hypertension      CHF  Dysrhythmias : atrial fibrillation  Past MI, CAD, PAD and CABG    Exercise tolerance: <4 METS     GI/Hepatic/Renal  Within defined limits       Renal disease       Endo/Other  Within defined limits      Anemia     Other Findings            Physical Exam    Airway  Mallampati: II  TM Distance: > 6 cm  Neck ROM: normal range of motion   Mouth opening: Normal     Cardiovascular  Regular rate and rhythm,  S1 and S2 normal,  no murmur, click, rub, or gallop             Dental    Dentition: Edentulous     Pulmonary  Breath sounds clear to auscultation               Abdominal  GI exam deferred       Other Findings            Anesthetic Plan    ASA: 3  Anesthesia type: general          Induction: Intravenous  Anesthetic plan and risks discussed with: Patient

## 2021-01-27 NOTE — PERIOP NOTES
TRANSFER - IN REPORT:    Verbal report received from [facility]) on Chris Richard  being received from 3N(unit) for ordered procedure      Report consisted of patients Situation, Background, Assessment and   Recommendations(SBAR). Information from the following report(s) SBAR, Kardex, Procedure Summary, Intake/Output and MAR was reviewed with the receiving nurse. Opportunity for questions and clarification was provided. Assessment completed upon patients arrival to unit and care assumed.

## 2021-01-27 NOTE — PROGRESS NOTES
Transition of Care  1. RUR-21%  2. Disposition: TBD: based on medical progress  3. PT refused Colonoscopy but agreed to GI  3. PT will need IM letter prior to discharge     PT scheduled for left AKA later today. PT to continue working with PT/OT follow surgery. Skye Marin       CM: [address]. MSW,   [phone]

## 2021-01-27 NOTE — PROGRESS NOTES
Bedside and Verbal shift change report given to Vianney Valerio (oncoming nurse) by Ally Anguiano (offgoing nurse). Report included the following information SBAR, Kardex, Intake/Output and Accordion.

## 2021-01-27 NOTE — ANESTHESIA POSTPROCEDURE EVALUATION
Post-Anesthesia Evaluation and Assessment    Patient: Seb Rand MRN: [id number]  SSN: [id number]    YOB: 1955  Age: 77 y.o. Sex: male      I have evaluated the patient and they are stable and ready for discharge from the PACU. Cardiovascular Function/Vital Signs  Visit Vitals  BP (!) 162/100   Pulse (!) 110   Temp 37.2 °C (98.9 °F)   Resp 15   Ht 5' 7\" (1.702 m)   Wt 39.8 kg (87 lb 11.9 oz)   SpO2 100%   BMI 13.74 kg/m²       Patient is status post General anesthesia for Procedure(s):  LEFT ABOVE THE KNEE AMPUTATION(URGENT). Nausea/Vomiting: None    Postoperative hydration reviewed and adequate. Pain:  Pain Scale 1: Numeric (0 - 10) (01/27/21 1622)  Pain Intensity 1: 0 (01/27/21 1622)   Managed    Neurological Status:   Neuro (WDL): Exceptions to WDL (01/27/21 1545)  Neuro  Neurologic State: Drowsy (01/27/21 1545)  Orientation Level: Oriented to person;Oriented to place;Oriented to situation (01/27/21 1545)  Cognition: Follows commands (01/27/21 1545)  Speech: Clear (01/27/21 1545)  Assessment L Pupil: Round (01/21/21 2000)  Size L Pupil (mm): 3 (01/21/21 2000)  Assessment R Pupil: Round (01/21/21 2000)  Size R Pupil (mm): 3 (01/21/21 2000)  LUE Motor Response: Purposeful (01/27/21 1545)  LLE Motor Response: Purposeful (01/27/21 1545)  RUE Motor Response: Purposeful (01/27/21 1545)  RLE Motor Response: Purposeful (01/27/21 1545)   At baseline    Mental Status, Level of Consciousness: Alert and  oriented to person, place, and time    Pulmonary Status:   O2 Device: Nasal cannula (01/27/21 1622)   Adequate oxygenation and airway patent    Complications related to anesthesia: None    Post-anesthesia assessment completed. No concerns    Signed By: Thayne Mohs, MD     January 27, 2021              Procedure(s):  LEFT ABOVE THE KNEE AMPUTATION(URGENT).     general    <BSHSIANPOST>    INITIAL Post-op Vital signs:   Vitals Value Taken Time   /93 01/27/21 1600   Temp 36.5 °C (97.7 °F) 01/27/21 1610   Pulse 94 01/27/21 1610   Resp 15 01/27/21 1610   SpO2 100 % 01/27/21 1610

## 2021-01-27 NOTE — PROGRESS NOTES
0730 - Assumed care of patient, report received from [facility]. Patient resting in bed, denies pain, call bell within reach. Plan for surgery L-AKA after lunch. Patient verbalizes understanding of plan of care. No further concerns at this time. 1000 - CHG bath given, gown changed. 1130 - Report given to Edelmira Forrest RN in preop. Consent is signed for the procedure, preop checklist done, patient ready to go when transport arrives. 1600 - Pt back from OR, tolerated procedure well. No complaints of pain, blood pressures running high but gave BP meds as scheduled. Will continue to monitor. 1930 - Report given to [address] RN and [facility] RN      Problem: Falls - Risk of  Goal: *Absence of Falls  Description: Document Kim Rivera Fall Risk and appropriate interventions in the flowsheet. Outcome: Progressing Towards Goal  Note: Fall Risk Interventions:  Mobility Interventions: Communicate number of staff needed for ambulation/transfer         Medication Interventions: Bed/chair exit alarm    Elimination Interventions: Call light in reach              Problem: Pressure Injury - Risk of  Goal: *Prevention of pressure injury  Description: Document Jhonny Scale and appropriate interventions in the flowsheet.   Outcome: Progressing Towards Goal  Note: Pressure Injury Interventions:  Sensory Interventions: Assess changes in LOC, Assess need for specialty bed    Moisture Interventions: Absorbent underpads    Activity Interventions: Assess need for specialty bed, Chair cushion    Mobility Interventions: Assess need for specialty bed, Chair cushion, HOB 30 degrees or less, Float heels    Nutrition Interventions: Document food/fluid/supplement intake    Friction and Shear Interventions: Apply protective barrier, creams and emollients, HOB 30 degrees or less

## 2021-01-27 NOTE — PROGRESS NOTES
Vascular  Long discussion with the patient and his daughter  Plan left AKA early afternoon.  Following

## 2021-01-27 NOTE — PROGRESS NOTES
Problem: Falls - Risk of  Goal: *Absence of Falls  Description: Document Dankika Rayo Fall Risk and appropriate interventions in the flowsheet. Outcome: Progressing Towards Goal  Note: Fall Risk Interventions:  Mobility Interventions: Communicate number of staff needed for ambulation/transfer         Medication Interventions: Bed/chair exit alarm    Elimination Interventions: Call light in reach              Problem: Pressure Injury - Risk of  Goal: *Prevention of pressure injury  Description: Document Jhonny Scale and appropriate interventions in the flowsheet.   Outcome: Progressing Towards Goal  Note: Pressure Injury Interventions:  Sensory Interventions: Assess changes in LOC, Assess need for specialty bed    Moisture Interventions: Absorbent underpads    Activity Interventions: Assess need for specialty bed, Chair cushion    Mobility Interventions: Assess need for specialty bed, Chair cushion, HOB 30 degrees or less, Float heels    Nutrition Interventions: Document food/fluid/supplement intake    Friction and Shear Interventions: Apply protective barrier, creams and emollients, HOB 30 degrees or less

## 2021-01-27 NOTE — BRIEF OP NOTE
Brief Postoperative Note    Patient: Kristel Chan  YOB: 1955  MRN: [id number]    Date of Procedure: 1/27/2021     Pre-Op Diagnosis: Osteomyelitis of midfoot    Post-Op Diagnosis: Same as preoperative diagnosis.       Procedure(s):  LEFT ABOVE THE KNEE AMPUTATION(URGENT)    Surgeon(s):  Kimberli Sue MD    Surgical Assistant: Surg Asst-1: Paul ANDERSEN    Anesthesia: General     Estimated Blood Loss (mL): 802     Complications: None    Specimens:   ID Type Source Tests Collected by Time Destination   1 : Left Leg Above Knee Amputation Fresh Leg, Left  Kimberli Sue MD 1/27/2021 1416 Pathology        Implants: * No implants in log *    Drains: * No LDAs found *    Findings: *Lateral extraanatomic bypass severed on initital incision accounting for all of the blood loss (150ml) and it was sutured quite easily for control**    Electronically Signed by Belkis Hoyt MD on 1/27/2021 at 2:35 PM

## 2021-01-27 NOTE — PROGRESS NOTES
[facility]  Hospitalist Group                                                                                          Hospitalist Progress Note  Jorge Peralta MD  Answering service: [phone] from in house phone        Date of Service:  2021  NAME:  Charlotte Allen  :  1955  MRN:  [id number]      Admission Summary: This is a 10-year-old man with past medical history significant for hypertension; dyslipidemia; peripheral artery disease, status post right above-knee amputation; coronary artery disease, status post CABG; chronic atrial fibrillation; status post CVA; and status post PEG tube placement, who was in his usual state of health until the day of his presentation at the emergency room when it was reported that the patient's hemoglobin was low at the nursing home where the patient presently resides. Hemoglobin was checked at the facility because of his chronic left foot ulcer. The patient was found to have hemoglobin of 6.2. He was sent to [facility] Emergency Room for further evaluation. According to report, the patient used to have wound VAC in the left foot. The wound VAC was removed because of excessive bleeding. When the patient arrived at the emergency room at [facility], the low hemoglobin was confirmed. Blood transfusion was started. The patient was transferred to [facility] for further evaluation. Not only was the low hemoglobin confirmed at the emergency room at [facility], the patient reported that he was passing dark stool and the stool occult blood was positive, and no GI service [facility].  Blood transfusion was started when the patient arrived at [facility] emergency room. The patient has completed 1 unit of packed red blood cells. He was referred to the hospitalist service for evaluation for admission.   He was recently admitted to [facility].  The patient was admitted and treated for sepsis coming from the left leg wound. Following the treatment, he was discharged to  where the patient presently resides. The patient is not a good historian but denies chest pain, fever, rigors, chills.       Interval history / Subjective:     F/u GI bleed   Left AKA 1/27  Pain controlled  Assessment & Plan:     Infected left foot ulcer stage III  ?osteomyelitis  -was admitted to [facility] for this and the patient is presently receiving intravenous antibiotics through the PICC line  -Wound culture Pseudomonas/Diphtheroids  -MRI foot 1/23  suggestive of osteomyelitis  -Was on Vanc/cefepime/Doxy, Vanc, Doxy discontinued, continue cefepime  -Appreciate ID/Podiatry/Vascular, noted plans for possible left AKA  -s/p Left AKA 1/27    Acute upper GI bleed  -Protonix  -Appreciate GI, diagnostic EGD only 1/21 since the patient is on Plavix:  Esophagus:normal  Stomach:PEG tube seen in body, no other lesions seen  Duodenum/jejunum:Two non bleeding AVMs noted in descending duodenum. Now on mechanical soft diet  -The patient refused colonoscopy, GI signed off     Acute blood loss anemia  -likely sec to above  -s/p 1 unit PRBC 1/21  -h/h  -transfuse for hb<7    Hypertension, uncontrolled  -Continue home meds  -May increase the dose of Coreg if BP remains elevated    Hypokalemia  -replace as needed    Dyslipidemia  -stable    Peripheral artery disease  -status post right above-knee amputation  -Antiplatelet therapy medication is on hold until when the patient is cleared by the gastroenterologist.  -Appreciate vascular surgery    Coronary artery disease, status post CABG/Chronic atrial fibrillation  -Continue home meds  -Antiplatelets on hold    S/p PEG tube placement:  The patient has no difficulties in swallowing. The PEG tube was placed so that the patient can receive supplemental feeding.     Mechanical soft diet    PT/OT    Code status: FULL CODE  DVT prophylaxis: No anticoagulation sec to GI bleed  Spoke with patient's daughter Denny Wright (medical POA) [phone]    Plan: Follow ID, Vascular. Care Plan discussed with: Patient/Family  Anticipated Disposition: TBD  Anticipated Discharge: Greater than 48 hours     Hospital Problems  Date Reviewed: 1/21/2021          Codes Class Noted POA    * (Principal) Acute upper GI bleed ICD-10-CM: K92.2  ICD-9-CM: 578.9  1/21/2021 Yes                Review of Systems:   A comprehensive review of systems was negative except for that written in the HPI. Vital Signs:    Last 24hrs VS reviewed since prior progress note. Most recent are:  Visit Vitals  BP (!) 176/101   Pulse (!) 111   Temp 98 °F (36.7 °C)   Resp 17   Ht 5' 7\" (1.702 m)   Wt 39.8 kg (87 lb 11.9 oz)   SpO2 100%   BMI 13.74 kg/m²         Intake/Output Summary (Last 24 hours) at 1/27/2021 1548  Last data filed at 1/27/2021 1442  Gross per 24 hour   Intake 400 ml   Output 50 ml   Net 350 ml        Physical Examination:     I had a face to face encounter with this patient and independently examined them on 1/27/2021 as outlined below:          Constitutional:  No acute distress   ENT:  Oral mucosa moist, oropharynx benign. Resp:  CTA bilaterally. No wheezing/rhonchi/rales. No accessory muscle use   CV:  Regular rhythm, normal rate, no murmurs, gallops, rubs    GI:  Soft, non distended, non tender. normoactive bowel sounds, no hepatosplenomegaly     Musculoskeletal:  No edema, warm, 2+ pulses throughout, right AKA    Neurologic: AAOx3, CN II-XII reviewed     Psych:  Good insight, Not anxious nor agitated.        Data Review:    Review and/or order of clinical lab test      Labs:     Recent Labs     01/26/21  0402   WBC 8.7   HGB 8.2*   HCT 27.0*   *     Recent Labs     01/27/21  0323 01/26/21  0402 01/25/21  0420    139 140   K 3.8 3.9 3.9    107 109*   CO2 30 28 28   BUN 9 8 10   CREA 0.60* 0.56* 0.56*   GLU 91 98 104*   CA 9.2 8.9 8.6     No results for input(s): ALT, AP, TBIL, TBILI, TP, ALB, GLOB, GGT, AML, LPSE in the last 72 hours. No lab exists for component: SGOT, GPT, AMYP, HLPSE  No results for input(s): INR, PTP, APTT, INREXT, INREXT in the last 72 hours. No results for input(s): FE, TIBC, PSAT, FERR in the last 72 hours. Lab Results   Component Value Date/Time    Folate 10.9 01/21/2021 09:02 AM      No results for input(s): PH, PCO2, PO2 in the last 72 hours. No results for input(s): CPK, CKNDX, TROIQ in the last 72 hours.     No lab exists for component: CPKMB  No results found for: CHOL, CHOLX, CHLST, CHOLV, HDL, HDLP, LDL, LDLC, DLDLP, TGLX, TRIGL, TRIGP, CHHD, CHHDX  Lab Results   Component Value Date/Time    Glucose (POC) 121 (H) 12/07/2020 04:18 PM     Lab Results   Component Value Date/Time    Color Yellow/Straw 12/04/2020 08:27 PM    Appearance Clear 12/04/2020 08:27 PM    Specific gravity 1.015 12/04/2020 08:27 PM    pH (UA) 7.0 12/04/2020 08:27 PM    Protein 30 (A) 12/04/2020 08:27 PM    Glucose Negative 12/04/2020 08:27 PM    Ketone Negative 12/04/2020 08:27 PM    Bilirubin Negative 12/04/2020 08:27 PM    Urobilinogen 0.2 12/04/2020 08:27 PM    Nitrites Negative 12/04/2020 08:27 PM    Leukocyte Esterase Negative 12/04/2020 08:27 PM    Epithelial cells Few 12/04/2020 08:27 PM    Bacteria Negative (A) 12/04/2020 08:27 PM    WBC 0-4 12/04/2020 08:27 PM    RBC 0-5 12/04/2020 08:27 PM         Medications Reviewed:     Current Facility-Administered Medications   Medication Dose Route Frequency    lactated Ringers infusion 1,000 mL  1,000 mL IntraVENous CONTINUOUS    0.9% sodium chloride infusion  25 mL/hr IntraVENous CONTINUOUS    fentaNYL citrate (PF) injection 25 mcg  25 mcg IntraVENous Multiple    morphine 10 mg/ml injection 2 mg  2 mg IntraVENous Multiple    HYDROmorphone (PF) (DILAUDID) injection 0.2 mg  0.2 mg IntraVENous Multiple    albuterol (PROVENTIL VENTOLIN) nebulizer solution 2.5 mg  2.5 mg Inhalation PRN    midazolam (VERSED) injection 0.5 mg  0.5 mg IntraVENous Q5MIN PRN    diphenhydrAMINE (BENADRYL) injection 12.5 mg  12.5 mg IntraVENous PRN    HYDROmorphone (PF) (DILAUDID) injection 0.5 mg  0.5 mg IntraVENous Q4H PRN    cefepime (MAXIPIME) 2 g in 0.9% sodium chloride (MBP/ADV) 100 mL MBP  2 g IntraVENous Q24H    hydrALAZINE (APRESOLINE) 20 mg/mL injection 10 mg  10 mg IntraVENous Q6H PRN    lisinopriL (PRINIVIL, ZESTRIL) tablet 10 mg  10 mg Oral DAILY    atorvastatin (LIPITOR) tablet 40 mg  40 mg Oral DAILY    carvediloL (COREG) tablet 6.25 mg  6.25 mg Oral BID WITH MEALS    ferrous sulfate tablet 325 mg  325 mg Oral ACB    hydrALAZINE (APRESOLINE) tablet 25 mg  25 mg Oral TID    loratadine (CLARITIN) tablet 10 mg  10 mg Oral DAILY    oxyCODONE-acetaminophen (PERCOCET) 5-325 mg per tablet 1 Tab  1 Tab Oral Q4H PRN    polyethylene glycol (MIRALAX) packet 17 g  17 g Oral DAILY    senna (SENOKOT) tablet 8.6 mg  1 Tab Oral DAILY    sodium chloride (NS) flush 5-40 mL  5-40 mL IntraVENous Q8H    sodium chloride (NS) flush 5-40 mL  5-40 mL IntraVENous PRN    acetaminophen (TYLENOL) tablet 650 mg  650 mg Oral Q6H PRN    Or    acetaminophen (TYLENOL) suppository 650 mg  650 mg Rectal Q6H PRN    promethazine (PHENERGAN) tablet 12.5 mg  12.5 mg Oral Q6H PRN    Or    ondansetron (ZOFRAN) injection 4 mg  4 mg IntraVENous Q6H PRN    lactobac ac& pc-s.therm-b.anim (MELODY Q/RISAQUAD)  1 Cap Oral DAILY    pantoprazole (PROTONIX) 40 mg in 0.9% sodium chloride 10 mL injection  40 mg IntraVENous Q12H     ______________________________________________________________________  EXPECTED LENGTH OF STAY: 4d 9h  ACTUAL LENGTH OF STAY:          6                 Jamel Jennings MD

## 2021-01-27 NOTE — PERIOP NOTES
TRANSFER - OUT REPORT:    Verbal report given to Wiliam Vicente (name) on Warren Console  being transferred to  (unit) for routine post - op       Report consisted of patients Situation, Background, Assessment and   Recommendations(SBAR). Time Pre op antibiotic given:1349  Anesthesia Stop time: 9335  Doherty Present on Transfer to floor:no  Order for Doherty on Chart:no  Discharge Prescriptions with Chart:no    Information from the following report(s) SBAR, OR Summary, Procedure Summary, Intake/Output, MAR, Recent Results and Cardiac Rhythm NSR was reviewed with the receiving nurse. Opportunity for questions and clarification was provided. Is the patient on 02? YES       L/Min 2       Other nc    Is the patient on a monitor? YES    Is the nurse transporting with the patient? YES    Surgical Waiting Area notified of patient's transfer from PACU? YES      The following personal items collected during your admission accompanied patient upon transfer:   Dental Appliance: Dental Appliances: None(PT STATES, \"I HAVE NO TEETH. \")  Vision: Visual Aid: Glasses, At bedside  Hearing Aid:    Jewelry:    Clothing:    Other Valuables:    Valuables sent to safe:        Notified anesthesia, will sign out when available. Ivana Hough with transfer.

## 2021-01-28 LAB
ANION GAP SERPL CALC-SCNC: 4 MMOL/L (ref 5–15)
BASOPHILS # BLD: 0 K/UL (ref 0–0.1)
BASOPHILS NFR BLD: 0 % (ref 0–1)
BUN SERPL-MCNC: 14 MG/DL (ref 6–20)
BUN/CREAT SERPL: 22 (ref 12–20)
CALCIUM SERPL-MCNC: 9.5 MG/DL (ref 8.5–10.1)
CHLORIDE SERPL-SCNC: 104 MMOL/L (ref 97–108)
CO2 SERPL-SCNC: 26 MMOL/L (ref 21–32)
CREAT SERPL-MCNC: 0.63 MG/DL (ref 0.7–1.3)
DIFFERENTIAL METHOD BLD: ABNORMAL
EOSINOPHIL # BLD: 0 K/UL (ref 0–0.4)
EOSINOPHIL NFR BLD: 0 % (ref 0–7)
ERYTHROCYTE [DISTWIDTH] IN BLOOD BY AUTOMATED COUNT: 19.1 % (ref 11.5–14.5)
GLUCOSE SERPL-MCNC: 134 MG/DL (ref 65–100)
HCT VFR BLD AUTO: 26 % (ref 36.6–50.3)
HGB BLD-MCNC: 8 G/DL (ref 12.1–17)
IMM GRANULOCYTES # BLD AUTO: 0.1 K/UL (ref 0–0.04)
IMM GRANULOCYTES NFR BLD AUTO: 1 % (ref 0–0.5)
LYMPHOCYTES # BLD: 1.5 K/UL (ref 0.8–3.5)
LYMPHOCYTES NFR BLD: 12 % (ref 12–49)
MCH RBC QN AUTO: 29.1 PG (ref 26–34)
MCHC RBC AUTO-ENTMCNC: 30.8 G/DL (ref 30–36.5)
MCV RBC AUTO: 94.5 FL (ref 80–99)
MONOCYTES # BLD: 1.2 K/UL (ref 0–1)
MONOCYTES NFR BLD: 10 % (ref 5–13)
NEUTS SEG # BLD: 9.8 K/UL (ref 1.8–8)
NEUTS SEG NFR BLD: 77 % (ref 32–75)
NRBC # BLD: 0 K/UL (ref 0–0.01)
NRBC BLD-RTO: 0 PER 100 WBC
PLATELET # BLD AUTO: 435 K/UL (ref 150–400)
PMV BLD AUTO: 9.1 FL (ref 8.9–12.9)
POTASSIUM SERPL-SCNC: 4.4 MMOL/L (ref 3.5–5.1)
RBC # BLD AUTO: 2.75 M/UL (ref 4.1–5.7)
SODIUM SERPL-SCNC: 134 MMOL/L (ref 136–145)
WBC # BLD AUTO: 12.6 K/UL (ref 4.1–11.1)

## 2021-01-28 PROCEDURE — 94760 N-INVAS EAR/PLS OXIMETRY 1: CPT

## 2021-01-28 PROCEDURE — 85025 COMPLETE CBC W/AUTO DIFF WBC: CPT

## 2021-01-28 PROCEDURE — 74011250637 HC RX REV CODE- 250/637: Performed by: HOSPITALIST

## 2021-01-28 PROCEDURE — 80048 BASIC METABOLIC PNL TOTAL CA: CPT

## 2021-01-28 PROCEDURE — 74011000250 HC RX REV CODE- 250: Performed by: INTERNAL MEDICINE

## 2021-01-28 PROCEDURE — 65660000000 HC RM CCU STEPDOWN

## 2021-01-28 PROCEDURE — 74011250637 HC RX REV CODE- 250/637: Performed by: INTERNAL MEDICINE

## 2021-01-28 PROCEDURE — C9113 INJ PANTOPRAZOLE SODIUM, VIA: HCPCS | Performed by: INTERNAL MEDICINE

## 2021-01-28 PROCEDURE — 36415 COLL VENOUS BLD VENIPUNCTURE: CPT

## 2021-01-28 PROCEDURE — 74011250636 HC RX REV CODE- 250/636: Performed by: INTERNAL MEDICINE

## 2021-01-28 PROCEDURE — 74011250636 HC RX REV CODE- 250/636: Performed by: SURGERY

## 2021-01-28 PROCEDURE — 74011000258 HC RX REV CODE- 258: Performed by: PODIATRIST

## 2021-01-28 PROCEDURE — 77010033678 HC OXYGEN DAILY

## 2021-01-28 PROCEDURE — 74011250636 HC RX REV CODE- 250/636: Performed by: PODIATRIST

## 2021-01-28 RX ORDER — LANOLIN ALCOHOL/MO/W.PET/CERES
100 CREAM (GRAM) TOPICAL DAILY
Status: DISCONTINUED | OUTPATIENT
Start: 2021-01-29 | End: 2021-02-02 | Stop reason: HOSPADM

## 2021-01-28 RX ADMIN — Medication 10 ML: at 21:20

## 2021-01-28 RX ADMIN — FERROUS SULFATE TAB 325 MG (65 MG ELEMENTAL FE) 325 MG: 325 (65 FE) TAB at 08:14

## 2021-01-28 RX ADMIN — HYDROMORPHONE HYDROCHLORIDE 0.5 MG: 1 INJECTION, SOLUTION INTRAMUSCULAR; INTRAVENOUS; SUBCUTANEOUS at 19:21

## 2021-01-28 RX ADMIN — Medication 1 CAPSULE: at 08:14

## 2021-01-28 RX ADMIN — CARVEDILOL 6.25 MG: 6.25 TABLET, FILM COATED ORAL at 18:50

## 2021-01-28 RX ADMIN — ATORVASTATIN CALCIUM 40 MG: 40 TABLET, FILM COATED ORAL at 08:14

## 2021-01-28 RX ADMIN — HYDRALAZINE HYDROCHLORIDE 25 MG: 50 TABLET, FILM COATED ORAL at 21:15

## 2021-01-28 RX ADMIN — Medication 8.6 MG: at 08:14

## 2021-01-28 RX ADMIN — LORATADINE 10 MG: 10 TABLET ORAL at 08:14

## 2021-01-28 RX ADMIN — LISINOPRIL 10 MG: 10 TABLET ORAL at 08:14

## 2021-01-28 RX ADMIN — HYDROMORPHONE HYDROCHLORIDE 0.5 MG: 1 INJECTION, SOLUTION INTRAMUSCULAR; INTRAVENOUS; SUBCUTANEOUS at 21:15

## 2021-01-28 RX ADMIN — POLYETHYLENE GLYCOL 3350 17 G: 17 POWDER, FOR SOLUTION ORAL at 08:15

## 2021-01-28 RX ADMIN — SODIUM CHLORIDE 40 MG: 9 INJECTION INTRAMUSCULAR; INTRAVENOUS; SUBCUTANEOUS at 21:14

## 2021-01-28 RX ADMIN — CARVEDILOL 6.25 MG: 6.25 TABLET, FILM COATED ORAL at 08:15

## 2021-01-28 RX ADMIN — SODIUM CHLORIDE 40 MG: 9 INJECTION INTRAMUSCULAR; INTRAVENOUS; SUBCUTANEOUS at 08:14

## 2021-01-28 RX ADMIN — HYDRALAZINE HYDROCHLORIDE 25 MG: 50 TABLET, FILM COATED ORAL at 18:50

## 2021-01-28 RX ADMIN — HYDRALAZINE HYDROCHLORIDE 25 MG: 50 TABLET, FILM COATED ORAL at 08:14

## 2021-01-28 RX ADMIN — OXYCODONE HYDROCHLORIDE AND ACETAMINOPHEN 1 TABLET: 5; 325 TABLET ORAL at 08:14

## 2021-01-28 RX ADMIN — CEFEPIME 2 G: 2 INJECTION, POWDER, FOR SOLUTION INTRAVENOUS at 10:32

## 2021-01-28 NOTE — PROGRESS NOTES
ID Progress Note  2021    Subjective:   Afebrile. No dyspnea    Objective:     Vitals:   Visit Vitals  /73 (BP 1 Location: Left arm, BP Patient Position: At rest)   Pulse (!) 120   Temp 98.6 °F (37 °C)   Resp 16   Ht 5' 7\" (1.702 m)   Wt 38.4 kg (84 lb 10.5 oz)   SpO2 100%   BMI 13.26 kg/m²        Tmax:  Temp (24hrs), Av.4 °F (36.9 °C), Min:97.7 °F (36.5 °C), Max:98.9 °F (37.2 °C)      Exam:    Not in distress  Lung clear, no rales, wheezes or rhonchi   Heart: tachycardic  Abdomen: soft nontender, no guarding or rebound  Right BKA, left AKA  Speech fluent   Labs:   Lab Results   Component Value Date/Time    WBC 12.6 (H) 2021 04:28 AM    HGB 8.0 (L) 2021 04:28 AM    HCT 26.0 (L) 2021 04:28 AM    PLATELET 283 (H)  04:28 AM    MCV 94.5 2021 04:28 AM     Lab Results   Component Value Date/Time    Sodium 134 (L) 2021 04:28 AM    Potassium 4.4 2021 04:28 AM    Chloride 104 2021 04:28 AM    CO2 26 2021 04:28 AM    Anion gap 4 (L) 2021 04:28 AM    Glucose 134 (H) 2021 04:28 AM    BUN 14 2021 04:28 AM    Creatinine 0.63 (L) 2021 04:28 AM    BUN/Creatinine ratio 22 (H) 2021 04:28 AM    GFR est AA >60 2021 04:28 AM    GFR est non-AA >60 2021 04:28 AM    Calcium 9.5 2021 04:28 AM    Bilirubin, total 0.5 2021 09:02 AM    Alk. phosphatase 121 (H) 2021 09:02 AM    Protein, total 7.2 2021 09:02 AM    Albumin 2.3 (L) 2021 09:02 AM    Globulin 4.9 (H) 2021 09:02 AM    A-G Ratio 0.5 (L) 2021 09:02 AM    ALT (SGPT) 8 (L) 2021 09:02 AM           Assessment:     #1 left foot infection  with osteomyelitis     #2 coronary artery disease     #3 peripheral vascular disease     #4 hypertension     #5 atrial fibrillation     #6 dyslipidemia    Recommendations:     He has had a left AKA. Because this is a surgical cure. He will not need abx on discharge.        Henrik Vasquez, MD

## 2021-01-28 NOTE — PROGRESS NOTES
Comprehensive Nutrition Assessment    Type and Reason for Visit: Reassess    Nutrition Recommendations/Plan:    1. Adjust tube feeds: Osmolite 1.5 @ 90ml/hr x 12hrs (7p to 7a) + 100ml flush before start and after completion. - add PEG tube to I/O flowsheet and document all EN administered   - document all PO and oral supplements consumed   - family may bring in foods  2. Add MVI and thiamine   3. Reweigh since s/p AKA    Nutrition Assessment:    77year old admitted for GI bleed, low Hgb, received blood transfusion. PMH: afib, CAD, HTN, dyslipidemia, s/p CABG, CVA, CHF, COPD, CKD, PVD s/p right AKA, s/p PEG placement for poor PO intake (not dysphagia). Previous AKA with left AKA for chronic foot wound completed on . GIB on admit with EGD showing 2 non-bleeding AVM - pt refused colonoscopy. When previously living with daughter would eat favorite foods and also received 2 Ensure via PEG. Pt living NH prior to admit. Decrease in intake since moving there since a very picky eater and PEG not utilized. Daughter would make foods daily to bring to father. Pt edentulous but no issues major issues chewing - diet order changed to dental soft for more food options. Current feeds providin ml, 1200 calories, 50 grams protein, 608ml free fluid + 200ml flush = 808ml fluid. PO intake remains poor with po meals. Pt visited and says \"I want to get this tube out,\" discussed with pt the increased needed after surgery and pt agreeable to continuation of night feeds. Recommend increase to: Osmolite 1.5 @ 90ml/hr x 12hrs (7p to 7a) + 100ml flush before start and after completion. Provides: 1080ml, 1620kcal, 68g protein, 820ml free fluid + 200ml flush = 1020ml + PO. Meets 96% energy needs, 85% protein needs with additional PO intake.  Continue Ensure BID with meals for 700kcal, 40g protein if consumed.      Malnutrition Assessment:   Malnutrition Status:  Severe malnutrition    Context:  Social/environmental circumstances     Findings of the 6 clinical characteristics of malnutrition:   Energy Intake:  7 - 50% or less est energy requirements for 1 month or longer  Weight Loss:  7.00 - Greater than 10% over 6 months     Body Fat Loss:  7 - Severe body fat loss, Buccal region, Fat overlying ribs, Orbital, Triceps   Muscle Mass Loss:  7 - Severe muscle mass loss, Clavicles (pectoralis &deltoids), Hand (interosseous), Scapula (trapezius), Temples (temporalis)  Fluid Accumulation:  Unable to assess,     Strength:  Not performed     Nutritionally Significant Medications: cefepime, iron, ernie-Q, protonix, miralax, senna    Estimated Daily Nutrient Needs:  Energy (kcal): 1696-2014kcal(32-38kcal/kg for wt gain ); Weight Used for Energy Requirements: Ideal(adjusted - 118lbs = 53kg)  Protein (g): 80-95g (1.5-1.8g/kg); Weight Used for Protein Requirements: Ideal(adjusted - 118lbs = 53kg)  Fluid (ml/day): 3946-9893 ml; Method Used for Fluid Requirements:      Nutrition Related Findings:       BM: 1/27 - black/green  Edema: none  Wounds:  old right AKA, new left AKA    Current Nutrition Therapies:   Diet: mechanical soft  Supplements: Ensure Enlive BID  Tube feeds: Osmolite 1.5 @ 80ml/hr x 10hrs with 100ml flush before and after feed  Meal intake:   Patient Vitals for the past 168 hrs:   % Diet Eaten   01/28/21 0915 100 %   01/27/21 0900 0 %   01/25/21 1825 15 %   01/25/21 1200 50 %   01/25/21 1025 60 %   01/24/21 1736 10 %   01/23/21 1800 40 %   01/23/21 1200 50 %   01/23/21 0930 40 %     Anthropometric Measures:  · Height:  5' 7\" (170.2 cm)  · Current Body Wt:  39 kg (85 lb 15.7 oz)   · Admission Body Wt:  86 lb    · Usual Body Wt:        · Ideal Body Wt:   :  58.1 %   · Adjusted Body Weight:  103.3;  Weight Adjustment for: Amputation   · Adjusted BMI:  16.2    · BMI Categories:  Underweight (BMI less than 22) age over 72     Wt Readings from Last 10 Encounters:   01/28/21 38.4 kg (84 lb 10.5 oz)   01/20/21 39.1 kg (86 lb 4.8 oz)   12/08/20 42.4 kg (93 lb 8 oz)   11/23/20 41.1 kg (90 lb 8 oz)     Nutrition Diagnosis:   · Inadequate oral intake, Severe malnutrition related to inadequate protein-energy intake as evidenced by intake 26-50%, nutrition support-enteral nutrition, severe loss of subcutaneous fat, severe muscle loss    · Increased nutrient needs related to (wound healing) as evidenced by wounds(s/p amputation)    Nutrition Interventions:   Food and/or Nutrient Delivery: Continue current diet, Modify tube feeding  Nutrition Education and Counseling: No recommendations at this time  Coordination of Nutrition Care: Continue to monitor while inpatient    Goals:  Pt to consume 50% meals, 1 supplement and tolerate nocutral enteal feedings x 5-7 days. Nutrition Monitoring and Evaluation:   Behavioral-Environmental Outcomes: None identified  Food/Nutrient Intake Outcomes: Food and nutrient intake, Supplement intake, Vitamin/mineral intake  Physical Signs/Symptoms Outcomes: Biochemical data, GI status, Meal time behavior, Weight    Discharge Planning:     Too soon to determine     Valente Maynard, RD  [address] , Pager #[phone] or via You[employer]

## 2021-01-28 NOTE — PROGRESS NOTES
Vascular  S/p left AKA  Dressing dry and intact  hgb 8  Sleepy but easily arousable  Family would love the patient to return to the same nursing facility that he came from if possible  We will be available as needed  Staples out in 3-4 weeks  followup at my office in 3-4 weeks

## 2021-01-28 NOTE — PROGRESS NOTES
Bedside and Verbal shift change report given to SOFI Lopez (oncoming nurse) by Beatris Yeh (offgoing nurse). Report included the following information SBAR, Kardex, ED Summary, Procedure Summary, Intake/Output, MAR, Recent Results and Med Rec Status.

## 2021-01-28 NOTE — PROGRESS NOTES
Transition of Care  1. RUR- 21%   2. Disposition: TBD; based on the PT medical progress. 3. PT will receive 2nd IM letter when prepared for D/C    Left AKA procedure completed 1/27. Pt is stable  PT discharge is greater than 48hrs. CM: [address]. MSKARLA,   [phone]    2:55pm    CM met with attending regarding potential D/C. Attending stated that pt would be possibly DC, tomorrow. PT/OT consults have been put in for tomorrow.        CM: [address]. MSW,   [phone]

## 2021-01-28 NOTE — OP NOTES
[address]   OPERATIVE REPORT    Name:  Vilma Larson  MR#:  [id number]  :  1955  ACCOUNT #:  [de-identified]  DATE OF SERVICE:  2021      PREOPERATIVE DIAGNOSIS:  Unreconstructable left foot osteomyelitis of the midfoot. POSTOPERATIVE DIAGNOSIS:  Unreconstructable left foot osteomyelitis of the midfoot. PROCEDURE PERFORMED:  Left above-knee amputation in a nonambulatory patient. SURGEON:  Laurie Medina MD    ASSISTANT:  Jeny Knight SA and Andrea Borja    ANESTHESIA:  General endotracheal.    ANESTHESIOLOGIST:  Dayton Nguyen MD    COMPLICATIONS:  None. SPECIMENS REMOVED:  *left leg from above the knee to the foot**. IMPLANTS:  None. ESTIMATED BLOOD LOSS:  150 mL. DRAINS:  None. FINDINGS:  The patient had an unexpected lateral extra-anatomic bypass that was injured during the initial transverse incision, pressure was held and this was suture ligated with appropriate suture. That is the only unexpected finding. INDICATIONS:  A very pleasant 78-year-old gentleman who has been treated down at [facility]. They did an extra-anatomic bypass with what looks like cadaver vein. The patient had progressive osteomyelitis of the midfoot and was unsalvageable, was opted since he is nonambulatory from the previous stroke to proceed with AK amputation. PROCEDURE:  After informed consent with the daughter and with the patient, he was taken to the operating room, he was prepped and draped. Thorough time-out was done. A transverse incision was made. We encountered bleeding laterally. Pressure was held over what appeared to be extra-anatomic bypass almost somewhat flattened and obscured. This was then suture ligated with 3-0 silk and the other side was also clipped. The quadriceps muscle was then transected with cautery. Small bleeding vessels were ligated. The femur was dissected free high above the amputation site and transected with a Gigli. Posterior longer flap was made and the amputation knife was used to transect the remainder of the leg after ligating between [facility] both the popliteal artery and vein, tying with a 2-0 silk. Small bleeding vessels and the sciatic nerve were pulled out, tied with 2-0 silk, divided and allowed to retract. Several ties were made. The area was irrigated. The fascia was closed with 2-0 Vicryl, the skin with staple. Sterile dressings were applied in the form of Adaptic, fluffs, Dennis, and an Ace wrap. The patient tolerated the procedure well and was taken to recovery room in satisfactory condition.         Elgin Tobin MD      [id number]  D:  01/27/2021 14:41  T:  01/28/2021 1:53  JOB #:  [id number]

## 2021-01-28 NOTE — PROGRESS NOTES
Problem: Falls - Risk of  Goal: *Absence of Falls  Description: Document Su Blood Fall Risk and appropriate interventions in the flowsheet. Outcome: Progressing Towards Goal  Note: Fall Risk Interventions:  Mobility Interventions: Communicate number of staff needed for ambulation/transfer         Medication Interventions: Evaluate medications/consider consulting pharmacy    Elimination Interventions: Call light in reach              Problem: Patient Education: Go to Patient Education Activity  Goal: Patient/Family Education  Outcome: Progressing Towards Goal     Problem: Pressure Injury - Risk of  Goal: *Prevention of pressure injury  Description: Document Jhonny Scale and appropriate interventions in the flowsheet.   Outcome: Progressing Towards Goal  Note: Pressure Injury Interventions:  Sensory Interventions: Assess changes in LOC    Moisture Interventions: Absorbent underpads    Activity Interventions: Assess need for specialty bed    Mobility Interventions: HOB 30 degrees or less    Nutrition Interventions: Document food/fluid/supplement intake    Friction and Shear Interventions: Apply protective barrier, creams and emollients                Problem: Patient Education: Go to Patient Education Activity  Goal: Patient/Family Education  Outcome: Progressing Towards Goal

## 2021-01-29 LAB
ANION GAP SERPL CALC-SCNC: 2 MMOL/L (ref 5–15)
BUN SERPL-MCNC: 14 MG/DL (ref 6–20)
BUN/CREAT SERPL: 24 (ref 12–20)
CALCIUM SERPL-MCNC: 9 MG/DL (ref 8.5–10.1)
CHLORIDE SERPL-SCNC: 105 MMOL/L (ref 97–108)
CO2 SERPL-SCNC: 30 MMOL/L (ref 21–32)
CREAT SERPL-MCNC: 0.58 MG/DL (ref 0.7–1.3)
ERYTHROCYTE [DISTWIDTH] IN BLOOD BY AUTOMATED COUNT: 18.6 % (ref 11.5–14.5)
GLUCOSE SERPL-MCNC: 126 MG/DL (ref 65–100)
HCT VFR BLD AUTO: 24.7 % (ref 36.6–50.3)
HGB BLD-MCNC: 7.7 G/DL (ref 12.1–17)
MAGNESIUM SERPL-MCNC: 1.8 MG/DL (ref 1.6–2.4)
MCH RBC QN AUTO: 29.5 PG (ref 26–34)
MCHC RBC AUTO-ENTMCNC: 31.2 G/DL (ref 30–36.5)
MCV RBC AUTO: 94.6 FL (ref 80–99)
NRBC # BLD: 0 K/UL (ref 0–0.01)
NRBC BLD-RTO: 0 PER 100 WBC
PHOSPHATE SERPL-MCNC: 2.4 MG/DL (ref 2.6–4.7)
PLATELET # BLD AUTO: 374 K/UL (ref 150–400)
PMV BLD AUTO: 9.1 FL (ref 8.9–12.9)
POTASSIUM SERPL-SCNC: 4.2 MMOL/L (ref 3.5–5.1)
RBC # BLD AUTO: 2.61 M/UL (ref 4.1–5.7)
SARS-COV-2, COV2: NORMAL
SODIUM SERPL-SCNC: 137 MMOL/L (ref 136–145)
WBC # BLD AUTO: 10.5 K/UL (ref 4.1–11.1)

## 2021-01-29 PROCEDURE — 97530 THERAPEUTIC ACTIVITIES: CPT

## 2021-01-29 PROCEDURE — 80048 BASIC METABOLIC PNL TOTAL CA: CPT

## 2021-01-29 PROCEDURE — 97165 OT EVAL LOW COMPLEX 30 MIN: CPT | Performed by: OCCUPATIONAL THERAPIST

## 2021-01-29 PROCEDURE — 74011000250 HC RX REV CODE- 250: Performed by: INTERNAL MEDICINE

## 2021-01-29 PROCEDURE — 65660000000 HC RM CCU STEPDOWN

## 2021-01-29 PROCEDURE — C9113 INJ PANTOPRAZOLE SODIUM, VIA: HCPCS | Performed by: INTERNAL MEDICINE

## 2021-01-29 PROCEDURE — 74011250636 HC RX REV CODE- 250/636: Performed by: INTERNAL MEDICINE

## 2021-01-29 PROCEDURE — 36415 COLL VENOUS BLD VENIPUNCTURE: CPT

## 2021-01-29 PROCEDURE — 74011250637 HC RX REV CODE- 250/637: Performed by: INTERNAL MEDICINE

## 2021-01-29 PROCEDURE — U0005 INFEC AGEN DETEC AMPLI PROBE: HCPCS

## 2021-01-29 PROCEDURE — 97535 SELF CARE MNGMENT TRAINING: CPT | Performed by: OCCUPATIONAL THERAPIST

## 2021-01-29 PROCEDURE — 93005 ELECTROCARDIOGRAM TRACING: CPT

## 2021-01-29 PROCEDURE — 74011250637 HC RX REV CODE- 250/637: Performed by: HOSPITALIST

## 2021-01-29 PROCEDURE — 97161 PT EVAL LOW COMPLEX 20 MIN: CPT

## 2021-01-29 PROCEDURE — 85027 COMPLETE CBC AUTOMATED: CPT

## 2021-01-29 PROCEDURE — 83735 ASSAY OF MAGNESIUM: CPT

## 2021-01-29 PROCEDURE — 84100 ASSAY OF PHOSPHORUS: CPT

## 2021-01-29 RX ADMIN — Medication 10 ML: at 21:09

## 2021-01-29 RX ADMIN — LISINOPRIL 10 MG: 10 TABLET ORAL at 12:37

## 2021-01-29 RX ADMIN — SODIUM CHLORIDE 500 ML: 9 INJECTION, SOLUTION INTRAVENOUS at 12:38

## 2021-01-29 RX ADMIN — CARVEDILOL 6.25 MG: 6.25 TABLET, FILM COATED ORAL at 08:47

## 2021-01-29 RX ADMIN — POLYETHYLENE GLYCOL 3350 17 G: 17 POWDER, FOR SOLUTION ORAL at 08:47

## 2021-01-29 RX ADMIN — OXYCODONE HYDROCHLORIDE AND ACETAMINOPHEN 1 TABLET: 5; 325 TABLET ORAL at 19:15

## 2021-01-29 RX ADMIN — CARVEDILOL 6.25 MG: 6.25 TABLET, FILM COATED ORAL at 17:39

## 2021-01-29 RX ADMIN — SODIUM CHLORIDE 40 MG: 9 INJECTION INTRAMUSCULAR; INTRAVENOUS; SUBCUTANEOUS at 08:47

## 2021-01-29 RX ADMIN — HYDRALAZINE HYDROCHLORIDE 25 MG: 50 TABLET, FILM COATED ORAL at 21:09

## 2021-01-29 RX ADMIN — SODIUM CHLORIDE 40 MG: 9 INJECTION INTRAMUSCULAR; INTRAVENOUS; SUBCUTANEOUS at 21:08

## 2021-01-29 RX ADMIN — Medication 8.6 MG: at 08:47

## 2021-01-29 RX ADMIN — OXYCODONE HYDROCHLORIDE AND ACETAMINOPHEN 1 TABLET: 5; 325 TABLET ORAL at 06:52

## 2021-01-29 RX ADMIN — HYDRALAZINE HYDROCHLORIDE 25 MG: 50 TABLET, FILM COATED ORAL at 17:39

## 2021-01-29 RX ADMIN — MULTIPLE VITAMINS W/ MINERALS TAB 1 TABLET: TAB at 08:47

## 2021-01-29 RX ADMIN — Medication 100 MG: at 08:47

## 2021-01-29 RX ADMIN — Medication 1 CAPSULE: at 08:47

## 2021-01-29 RX ADMIN — LORATADINE 10 MG: 10 TABLET ORAL at 08:47

## 2021-01-29 RX ADMIN — Medication 10 ML: at 19:15

## 2021-01-29 RX ADMIN — ATORVASTATIN CALCIUM 40 MG: 40 TABLET, FILM COATED ORAL at 08:47

## 2021-01-29 RX ADMIN — FERROUS SULFATE TAB 325 MG (65 MG ELEMENTAL FE) 325 MG: 325 (65 FE) TAB at 06:52

## 2021-01-29 RX ADMIN — HYDRALAZINE HYDROCHLORIDE 25 MG: 50 TABLET, FILM COATED ORAL at 08:47

## 2021-01-29 NOTE — PROGRESS NOTES
[facility]                                                                                          Hospitalist Progress Note  [address] [facility],   Answering service: [phone] from in house phone        Date of Service:  2021  NAME:  Alix Tom  :  1955  MRN:  [id number]      Admission Summary: This is a 40-year-old man with past medical history significant for hypertension; dyslipidemia; peripheral artery disease, status post right above-knee amputation; coronary artery disease, status post CABG; chronic atrial fibrillation; status post CVA; and status post PEG tube placement, who was in his usual state of health until the day of his presentation at the emergency room when it was reported that the patient's hemoglobin was low at the nursing home where the patient presently resides. Hemoglobin was checked at the facility because of his chronic left foot ulcer. The patient was found to have hemoglobin of 6.2. He was sent to [facility] Emergency Room for further evaluation. According to report, the patient used to have wound VAC in the left foot. The wound VAC was removed because of excessive bleeding. When the patient arrived at the emergency room at [facility], the low hemoglobin was confirmed. Blood transfusion was started. The patient was transferred to [facility] for further evaluation. Not only was the low hemoglobin confirmed at the emergency room at [facility], the patient reported that he was passing dark stool and the stool occult blood was positive, and no GI service [facility].  Blood transfusion was started when the patient arrived at [facility] emergency room. The patient has completed 1 unit of packed red blood cells. He was referred to the hospitalist service for evaluation for admission.   He was recently admitted to [facility].  The patient was admitted and treated for sepsis coming from the left leg wound. Following the treatment, he was discharged to  where the patient presently resides. The patient is not a good historian but denies chest pain, fever, rigors, chills.       Interval history / Subjective: Follow up osteomyelitis. Patient seen and examined. Daughter on speaker phone. She voices concerns for patient's weight and decreased appetite. Appreciate ID, vascular surgery, dietitian. Assessment & Plan:     Infected left foot ulcer stage III/osteomyelitis  -was admitted to [facility] for this and the patient is presently receiving intravenous antibiotics through the PICC line  -Wound culture Pseudomonas/Diphtheroids  -MRI foot 1/23  suggestive of osteomyelitis  -s/p Vanc/cefepime/Doxy, had surgical cure with AKA  -Appreciate ID/Podiatry/Vascular  -s/p Left AKA 1/27    Acute upper GI bleed  -Protonix  -Appreciate GI, diagnostic EGD only 1/21 since the patient is on Plavix:  Esophagus:normal  Stomach:PEG tube seen in body, no other lesions seen  Duodenum/jejunum:Two non bleeding AVMs noted in descending duodenum. Now on mechanical soft diet  -The patient refused colonoscopy, GI signed off    Acute blood loss anemia  -likely sec to above  -s/p 1 unit PRBC 1/21  -h/h  -transfuse for hb<7    Hypertension, uncontrolled  -Continue home meds  -May increase the dose of Coreg if BP remains elevated    Hypokalemia  -replace as needed    Dyslipidemia  -stable    Peripheral artery disease  -status post right above-knee amputation  -Antiplatelet therapy medication is on hold until when the patient is cleared by the gastroenterologist.  -Appreciate vascular surgery    Coronary artery disease, status post CABG/Chronic atrial fibrillation  -Continue home meds  -Antiplatelets on hold    S/p PEG tube placement:  The patient has no difficulties in swallowing.   The PEG tube was placed so that the patient can receive supplemental feeding.  -appreciate nutrition, continue night feeds     Mechanical soft diet    PT/OT    Code status: FULL CODE  DVT prophylaxis: No anticoagulation sec to GI bleed  Spoke with patient's daughter Chanell Shafer (medical POA) [phone]    Plan: Follow ID, Vascular. Care Plan discussed with: Patient/Family  Anticipated Disposition: TBD  Anticipated Discharge: Greater than 48 hours     Hospital Problems  Date Reviewed: 1/21/2021          Codes Class Noted POA    * (Principal) Acute upper GI bleed ICD-10-CM: K92.2  ICD-9-CM: 578.9  1/21/2021 Yes                Review of Systems:   A comprehensive review of systems was negative except for that written in the HPI. Vital Signs:    Last 24hrs VS reviewed since prior progress note. Most recent are:  Visit Vitals  /78   Pulse 96   Temp 98.6 °F (37 °C)   Resp 18   Ht 5' 7\" (1.702 m)   Wt 38.4 kg (84 lb 10.5 oz)   SpO2 100%   BMI 13.26 kg/m²         Intake/Output Summary (Last 24 hours) at 1/28/2021 1905  Last data filed at 1/28/2021 1418  Gross per 24 hour   Intake 200 ml   Output 250 ml   Net -50 ml        Physical Examination:     I had a face to face encounter with this patient and independently examined them on 1/28/2021 as outlined below:          Constitutional:  No acute distress   ENT:  Oral mucosa moist, oropharynx benign. Resp:  CTA bilaterally. No wheezing/rhonchi/rales. No accessory muscle use   CV:  Regular rhythm, normal rate, no murmurs, gallops, rubs    GI:  Soft, non distended, non tender. normoactive bowel sounds, no hepatosplenomegaly     Musculoskeletal:  No edema, warm, 2+ pulses throughout, right and left AKA    Neurologic: AAOx3, CN II-XII reviewed     Psych:  Good insight, Not anxious nor agitated.        Data Review:    Review and/or order of clinical lab test      Labs:     Recent Labs     01/28/21 0428 01/26/21  0402   WBC 12.6* 8.7   HGB 8.0* 8.2*   HCT 26.0* 27.0*   * 440*     Recent Labs     01/28/21 0428 01/27/21  0323 01/26/21  0402   * 141 139   K 4.4 3.8 3.9    107 107   CO2 26 30 28   BUN 14 9 8   CREA 0.63* 0.60* 0.56*   * 91 98   CA 9.5 9.2 8.9     No results for input(s): ALT, AP, TBIL, TBILI, TP, ALB, GLOB, GGT, AML, LPSE in the last 72 hours. No lab exists for component: SGOT, GPT, AMYP, HLPSE  No results for input(s): INR, PTP, APTT, INREXT, INREXT in the last 72 hours. No results for input(s): FE, TIBC, PSAT, FERR in the last 72 hours. Lab Results   Component Value Date/Time    Folate 10.9 01/21/2021 09:02 AM      No results for input(s): PH, PCO2, PO2 in the last 72 hours. No results for input(s): CPK, CKNDX, TROIQ in the last 72 hours.     No lab exists for component: CPKMB  No results found for: CHOL, CHOLX, CHLST, CHOLV, HDL, HDLP, LDL, LDLC, DLDLP, TGLX, TRIGL, TRIGP, CHHD, CHHDX  Lab Results   Component Value Date/Time    Glucose (POC) 121 (H) 12/07/2020 04:18 PM     Lab Results   Component Value Date/Time    Color Yellow/Straw 12/04/2020 08:27 PM    Appearance Clear 12/04/2020 08:27 PM    Specific gravity 1.015 12/04/2020 08:27 PM    pH (UA) 7.0 12/04/2020 08:27 PM    Protein 30 (A) 12/04/2020 08:27 PM    Glucose Negative 12/04/2020 08:27 PM    Ketone Negative 12/04/2020 08:27 PM    Bilirubin Negative 12/04/2020 08:27 PM    Urobilinogen 0.2 12/04/2020 08:27 PM    Nitrites Negative 12/04/2020 08:27 PM    Leukocyte Esterase Negative 12/04/2020 08:27 PM    Epithelial cells Few 12/04/2020 08:27 PM    Bacteria Negative (A) 12/04/2020 08:27 PM    WBC 0-4 12/04/2020 08:27 PM    RBC 0-5 12/04/2020 08:27 PM         Medications Reviewed:     Current Facility-Administered Medications   Medication Dose Route Frequency    [START ON 1/29/2021] thiamine HCL (B-1) tablet 100 mg  100 mg Oral DAILY    [START ON 1/29/2021] multivitamin, tx-iron-ca-min (THERA-M w/ IRON) tablet 1 Tab  1 Tab Oral DAILY    HYDROmorphone (PF) (DILAUDID) injection 0.5 mg  0.5 mg IntraVENous Q4H PRN    cefepime (MAXIPIME) 2 g in 0.9% sodium chloride (MBP/ADV) 100 mL MBP  2 g IntraVENous Q24H    hydrALAZINE (APRESOLINE) 20 mg/mL injection 10 mg  10 mg IntraVENous Q6H PRN    lisinopriL (PRINIVIL, ZESTRIL) tablet 10 mg  10 mg Oral DAILY    atorvastatin (LIPITOR) tablet 40 mg  40 mg Oral DAILY    carvediloL (COREG) tablet 6.25 mg  6.25 mg Oral BID WITH MEALS    ferrous sulfate tablet 325 mg  325 mg Oral ACB    hydrALAZINE (APRESOLINE) tablet 25 mg  25 mg Oral TID    loratadine (CLARITIN) tablet 10 mg  10 mg Oral DAILY    oxyCODONE-acetaminophen (PERCOCET) 5-325 mg per tablet 1 Tab  1 Tab Oral Q4H PRN    polyethylene glycol (MIRALAX) packet 17 g  17 g Oral DAILY    senna (SENOKOT) tablet 8.6 mg  1 Tab Oral DAILY    sodium chloride (NS) flush 5-40 mL  5-40 mL IntraVENous Q8H    sodium chloride (NS) flush 5-40 mL  5-40 mL IntraVENous PRN    acetaminophen (TYLENOL) tablet 650 mg  650 mg Oral Q6H PRN    Or    acetaminophen (TYLENOL) suppository 650 mg  650 mg Rectal Q6H PRN    promethazine (PHENERGAN) tablet 12.5 mg  12.5 mg Oral Q6H PRN    Or    ondansetron (ZOFRAN) injection 4 mg  4 mg IntraVENous Q6H PRN    lactobac ac& pc-s.therm-b.anim (MELODY Q/RISAQUAD)  1 Cap Oral DAILY    pantoprazole (PROTONIX) 40 mg in 0.9% sodium chloride 10 mL injection  40 mg IntraVENous Q12H     ______________________________________________________________________  EXPECTED LENGTH OF STAY: 4d 9h  ACTUAL LENGTH OF STAY:          [facility]

## 2021-01-29 NOTE — PROGRESS NOTES
Problem: Falls - Risk of  Goal: *Absence of Falls  Description: Document Jesusmon Davin Fall Risk and appropriate interventions in the flowsheet.   Outcome: Progressing Towards Goal  Note: Fall Risk Interventions:  Mobility Interventions: Bed/chair exit alarm         Medication Interventions: Assess postural VS orthostatic hypotension, Evaluate medications/consider consulting pharmacy    Elimination Interventions: Call light in reach

## 2021-01-29 NOTE — PROGRESS NOTES
1/29/2021; 08:45 -   CM notes that per [facility] website, patient has an [facility] on file with below information:    Assessment Tracking Number: [id number]  Successfully Processed  Assessment Date: 01/04/2021  Evgeny Paget Information:  Screener's Name:   [facility]  NPI: [id number]        Provider Name: [facility]                                    CRM: Dara Leach, MPH, [address] [place] [address]; Z: [phone]

## 2021-01-29 NOTE — PROGRESS NOTES
[facility] Adult  Hospitalist Group                                                                                          Hospitalist Progress Note  Lucila DO Miki  Answering service: [phone] from in house phone        Date of Service:  2021  NAME:  Elo Hdz  :  1955  MRN:  [id number]      Admission Summary: This is a 49-year-old man with past medical history significant for hypertension; dyslipidemia; peripheral artery disease, status post right above-knee amputation; coronary artery disease, status post CABG; chronic atrial fibrillation; status post CVA; and status post PEG tube placement, who was in his usual state of health until the day of his presentation at the emergency room when it was reported that the patient's hemoglobin was low at the nursing home where the patient presently resides. Hemoglobin was checked at the facility because of his chronic left foot ulcer. The patient was found to have hemoglobin of 6.2. He was sent to [facility] Emergency Room for further evaluation. According to report, the patient used to have wound VAC in the left foot. The wound VAC was removed because of excessive bleeding. When the patient arrived at the emergency room at [facility], the low hemoglobin was confirmed. Blood transfusion was started. The patient was transferred to [facility] for further evaluation. Not only was the low hemoglobin confirmed at the emergency room at [facility], the patient reported that he was passing dark stool and the stool occult blood was positive, and no GI service [facility].  Blood transfusion was started when the patient arrived at [facility] emergency room. The patient has completed 1 unit of packed red blood cells. He was referred to the hospitalist service for evaluation for admission.   He was recently admitted to [facility].  The patient was admitted and treated for sepsis coming from the left leg wound. Following the treatment, he was discharged to  where the patient presently resides. The patient is not a good historian but denies chest pain, fever, rigors, chills.       Interval history / Subjective: Follow up osteomyelitis. Patient seen and examined. Requesting food to be reheated. No other complaints. Worsening tachycardia. Discussed with daughter and requests referral to [facility]. Assessment & Plan:     Infected left foot ulcer stage III/osteomyelitis  -was admitted to [facility] for this and the patient is presently receiving intravenous antibiotics through the PICC line  -Wound culture Pseudomonas/Diphtheroids  -MRI foot 1/23 suggestive of osteomyelitis  -s/p Vanc/cefepime/Doxy, had surgical cure with AKA  -Appreciate ID/Podiatry/Vascular  -s/p Left AKA 1/27    Tachycardia:   -suspect volume depletion, will give IVFs  -consider D-dimer if not improved     Acute upper GI bleed  -Protonix  -Appreciate GI, diagnostic EGD only 1/21 since the patient is on Plavix:  Esophagus:normal  Stomach:PEG tube seen in body, no other lesions seen  Duodenum/jejunum:Two non bleeding AVMs noted in descending duodenum. Now on mechanical soft diet  -The patient refused colonoscopy, GI signed off  -Hgb down trending, will monitor     Acute blood loss anemia  -likely sec to above  -s/p 1 unit PRBC 1/21  -h/h  -transfuse for hb<7    Hypertension, uncontrolled  -continue carvedilol, hydralazine     Hypokalemia  -replace as needed    Dyslipidemia  -stable    Peripheral artery disease  -status post right above-knee amputation  -Antiplatelet therapy medication is on hold due to acute GI bleed   -Appreciate vascular surgery    Coronary artery disease, status post CABG/Chronic atrial fibrillation  -Continue home meds  -Antiplatelets on hold    S/p PEG tube placement:  The patient has no difficulties in swallowing.   The PEG tube was placed so that the patient can receive supplemental feeding.  -appreciate nutrition, continue night feeds     Mechanical soft diet    PT/OT    Code status: FULL CODE  DVT prophylaxis: No anticoagulation sec to GI bleed  Spoke with patient's daughter Hardik Pope (medical POA) [phone]    Care Plan discussed with: Patient/Family  Anticipated Disposition: TBD  Anticipated Discharge: 1-2 days     Hospital Problems  Date Reviewed: 1/21/2021          Codes Class Noted POA    * (Principal) Acute upper GI bleed ICD-10-CM: K92.2  ICD-9-CM: 578.9  1/21/2021 Yes                Review of Systems:   Negative unless stated above       Vital Signs:    Last 24hrs VS reviewed since prior progress note. Most recent are:  Visit Vitals  BP (!) 142/86 (BP 1 Location: Left upper arm, BP Patient Position: Supine)   Pulse (!) 122   Temp 98.3 °F (36.8 °C)   Resp 12   Ht 5' 7\" (1.702 m)   Wt 38.5 kg (84 lb 14 oz)   SpO2 99%   BMI 13.29 kg/m²         Intake/Output Summary (Last 24 hours) at 1/29/2021 0840  Last data filed at 1/28/2021 2036  Gross per 24 hour   Intake 300 ml   Output 250 ml   Net 50 ml        Physical Examination:     I had a face to face encounter with this patient and independently examined them on 1/29/2021 as outlined below:          Constitutional:  No acute distress   ENT:  Oral mucosa moist, oropharynx benign. Resp:  CTA bilaterally. No wheezing/rhonchi/rales. No accessory muscle use   CV:  tachycardia, no murmurs, gallops, rubs    GI:  Soft, non distended, non tender. normoactive bowel sounds, no hepatosplenomegaly     Musculoskeletal:  No edema, warm, 2+ pulses throughout, right and left AKA    Neurologic: AAOx3, CN II-XII reviewed     Psych:  Good insight, Not anxious nor agitated.        Data Review:    Review and/or order of clinical lab test      Labs:     Recent Labs     01/29/21 0514 01/28/21 0428   WBC 10.5 12.6*   HGB 7.7* 8.0*   HCT 24.7* 26.0*    435*     Recent Labs     01/29/21  0514 01/28/21 0428 01/27/21  0323   NA 137 134* 141   K 4.2 4.4 3.8    104 107   CO2 30 26 30   BUN 14 14 9   CREA 0.58* 0.63* 0.60*   * 134* 91   CA 9.0 9.5 9.2   MG 1.8  --   --    PHOS 2.4*  --   --      No results for input(s): ALT, AP, TBIL, TBILI, TP, ALB, GLOB, GGT, AML, LPSE in the last 72 hours. No lab exists for component: SGOT, GPT, AMYP, HLPSE  No results for input(s): INR, PTP, APTT, INREXT, INREXT in the last 72 hours. No results for input(s): FE, TIBC, PSAT, FERR in the last 72 hours. Lab Results   Component Value Date/Time    Folate 10.9 01/21/2021 09:02 AM      No results for input(s): PH, PCO2, PO2 in the last 72 hours. No results for input(s): CPK, CKNDX, TROIQ in the last 72 hours.     No lab exists for component: CPKMB  No results found for: CHOL, CHOLX, CHLST, CHOLV, HDL, HDLP, LDL, LDLC, DLDLP, TGLX, TRIGL, TRIGP, CHHD, CHHDX  Lab Results   Component Value Date/Time    Glucose (POC) 121 (H) 12/07/2020 04:18 PM     Lab Results   Component Value Date/Time    Color Yellow/Straw 12/04/2020 08:27 PM    Appearance Clear 12/04/2020 08:27 PM    Specific gravity 1.015 12/04/2020 08:27 PM    pH (UA) 7.0 12/04/2020 08:27 PM    Protein 30 (A) 12/04/2020 08:27 PM    Glucose Negative 12/04/2020 08:27 PM    Ketone Negative 12/04/2020 08:27 PM    Bilirubin Negative 12/04/2020 08:27 PM    Urobilinogen 0.2 12/04/2020 08:27 PM    Nitrites Negative 12/04/2020 08:27 PM    Leukocyte Esterase Negative 12/04/2020 08:27 PM    Epithelial cells Few 12/04/2020 08:27 PM    Bacteria Negative (A) 12/04/2020 08:27 PM    WBC 0-4 12/04/2020 08:27 PM    RBC 0-5 12/04/2020 08:27 PM         Medications Reviewed:     Current Facility-Administered Medications   Medication Dose Route Frequency    sodium chloride 0.9 % bolus infusion 500 mL  500 mL IntraVENous ONCE    thiamine HCL (B-1) tablet 100 mg  100 mg Oral DAILY    multivitamin, tx-iron-ca-min (THERA-M w/ IRON) tablet 1 Tab  1 Tab Oral DAILY    HYDROmorphone (PF) (DILAUDID) injection 0.5 mg 0.5 mg IntraVENous Q4H PRN    hydrALAZINE (APRESOLINE) 20 mg/mL injection 10 mg  10 mg IntraVENous Q6H PRN    lisinopriL (PRINIVIL, ZESTRIL) tablet 10 mg  10 mg Oral DAILY    atorvastatin (LIPITOR) tablet 40 mg  40 mg Oral DAILY    carvediloL (COREG) tablet 6.25 mg  6.25 mg Oral BID WITH MEALS    ferrous sulfate tablet 325 mg  325 mg Oral ACB    hydrALAZINE (APRESOLINE) tablet 25 mg  25 mg Oral TID    loratadine (CLARITIN) tablet 10 mg  10 mg Oral DAILY    oxyCODONE-acetaminophen (PERCOCET) 5-325 mg per tablet 1 Tab  1 Tab Oral Q4H PRN    polyethylene glycol (MIRALAX) packet 17 g  17 g Oral DAILY    senna (SENOKOT) tablet 8.6 mg  1 Tab Oral DAILY    sodium chloride (NS) flush 5-40 mL  5-40 mL IntraVENous Q8H    sodium chloride (NS) flush 5-40 mL  5-40 mL IntraVENous PRN    acetaminophen (TYLENOL) tablet 650 mg  650 mg Oral Q6H PRN    Or    acetaminophen (TYLENOL) suppository 650 mg  650 mg Rectal Q6H PRN    promethazine (PHENERGAN) tablet 12.5 mg  12.5 mg Oral Q6H PRN    Or    ondansetron (ZOFRAN) injection 4 mg  4 mg IntraVENous Q6H PRN    lactobac ac& pc-s.therm-b.anim (MELODY Q/RISAQUAD)  1 Cap Oral DAILY    pantoprazole (PROTONIX) 40 mg in 0.9% sodium chloride 10 mL injection  40 mg IntraVENous Q12H     ______________________________________________________________________  EXPECTED LENGTH OF STAY: 4d 9h  ACTUAL LENGTH OF STAY:          [address] CHARLI Johnson DO

## 2021-01-29 NOTE — PROGRESS NOTES
Problem: Mobility Impaired (Adult and Pediatric)  Goal: *Acute Goals and Plan of Care (Insert Text)  Description: FUNCTIONAL STATUS PRIOR TO ADMISSION: The patient was functional at the wheelchair level and required assist for transfers to the chair. He is s/p a previous right AKA    HOME SUPPORT PRIOR TO ADMISSION: The patient lived with family who provided assist as needed. Physical Therapy Goals  Initiated 1/29/2021  1. Patient will move from supine to sit and sit to supine , scoot up and down, and roll side to side in bed with minimal assistance/contact guard assist within 7 day(s). 2.  Patient will transfer from bed to chair and chair to bed with minimal assistance/contact guard assist using the least restrictive device within 7 day(s). 3.  Patient will demonstrated the ability to participate in dynamic sitting balance with supervision within 7 day(s). 4.  Patient will participate in ex program within 7 days. Outcome: Progressing Towards Goal   PHYSICAL THERAPY EVALUATION  Patient: Peter Singh (68 y.o. male)  Date: 1/29/2021  Primary Diagnosis: Acute upper GI bleed [K92.2]  Procedure(s) (LRB):  LEFT ABOVE THE KNEE AMPUTATION(URGENT) (Left) 2 Days Post-Op   Precautions:   Fall, Skin    ASSESSMENT  Based on the objective data described below, the patient presents with generalized weakness, good supported sitting balance but impaired unsupported sitting balance, some post op pain (but he was able to fully participate in eval) and likely a decline in functional mobility, POD 2 from a left AKA (and previous AKA on the right and was nonambulatory at the time of admission). His HR was tachy at rest and with activity, discussed with his nurse, see chart below. Family had assisted him with transfers bed <> wheelchair prior to admission. He was able to get up to the chair using a T transfer. Discussed with his nurse recommending use of a T transfer and wrote it on his white communication board as well. Anticipate steady gains and likely need for rehab    Current Level of Function Impacting Discharge (mobility/balance): up to max assist provided. Functional Outcome Measure: The patient scored 0 (unable) on the TUG outcome measure which is indicative of high fall riks. .      Other factors to consider for discharge: home alone at times      Vitals:      01/29/21 0916 01/29/21 0929   BP:   130/80 133/79   BP 1 Location:   Left upper arm Left upper arm   BP Patient Position:   Supine Sitting  Comment: post activity   Pulse:   (!) 122 (!) 124   Resp:       Temp:       SpO2: on room air   99% 100%                       Patient will benefit from skilled therapy intervention to address the above noted impairments. PLAN :  Recommendations and Planned Interventions: bed mobility training, transfer training, therapeutic exercises, patient and family training/education, and therapeutic activities      Frequency/Duration: Patient will be followed by physical therapy:  3 times a week to address goals. Recommendation for discharge: (in order for the patient to meet his/her long term goals)  Therapy up to 5 days/week in SNF setting    This discharge recommendation:  Has been made in collaboration with the attending provider and/or case management    IF patient discharges home will need the following DME: portable ramps are recommend and his own wheel chair would require reassessment for any changes needed to maintain his safety anti tippers vs more involved changes.          SUBJECTIVE:   Patient stated that he would like to try to get up to the chair    OBJECTIVE DATA SUMMARY:   Consult received, chart reviewed, pt cleared by nursing  HISTORY:    Past Medical History:   Diagnosis Date    Atrial fibrillation ([address]     Hypertension     [facility]     PVD (peripheral vascular disease) [facility]      Past Surgical History:   Procedure Laterality Date    HX ABOVE KNEE AMPUTATION Right     HX ABOVE KNEE AMPUTATION Right     HX HEART CATHETERIZATION         Personal factors and/or comorbidities impacting plan of care: previous right AKA, cardiac history, is alone at home at times (per chart review)    Home Situation  Home Environment: Private residence  # Steps to Enter: 2  Wheelchair Ramp: No, per pt  Living Alone: No  Support Systems: Family member(s)  Current DME Used/Available at Home: Wheelchair, Commode, bedside    EXAMINATION/PRESENTATION/DECISION MAKING:   Critical Behavior:  Neurologic State: Alert  Orientation Level: Oriented X4  Cognition: Follows commands     Hearing: Auditory  Auditory Impairment: None  Skin:  refer to MD and nursing notes, post op bandage/ace wrap on LLE  Edema: none noted  Range Of Motion:  AROM: Generally decreased, functional                       Strength:    Strength: Generally decreased, functional                    Tone & Sensation:                  Sensation: (pt guarding of LLE, light touch not attempted )               Coordination:     Vision:      Functional Mobility:  Bed Mobility:     Supine to Sit: Moderate assistance        Transfers:              Bed to Chair: Maximum assistance(using a \"T\" transfer)              Balance:   Sitting: Impaired  Sitting - Static: Fair (occasional)  Sitting - Dynamic: Fair (occasional)  Sitting balance supported is good, no noted deficits. Ambulation/Gait Training:                                                      N/a   Stairs: Therapeutic Exercises:       Functional Measure:  Timed up and go:    Timed Get Up And Go Test: 0(pt unable)       < than 10 seconds=Normal  Greater then 13.5 seconds (in elderly)=Increased fall risk   Yasmin Potts [place] M. Predicting the probability for falls in community dwelling older adults using the Timed Up and Go Test. Phys Ther. 2000;[id number].              Physical Therapy Evaluation Charge Determination   History Examination Presentation Decision-Making   MEDIUM Complexity : 1-2 comorbidities / personal factors will impact the outcome/ POC  MEDIUM Complexity : 3 Standardized tests and measures addressing body structure, function, activity limitation and / or participation in recreation  LOW Complexity : Stable, uncomplicated  LOW Complexity : FOTO score of       Based on the above components, the patient evaluation is determined to be of the following complexity level: LOW     Pain Ratin left leg    Activity Tolerance:   Good and requires rest breaks    After treatment patient left in no apparent distress:   Sitting in chair and Call bell within reach    COMMUNICATION/EDUCATION:   The patients plan of care was discussed with: Occupational therapist and Registered nurse. Fall prevention education was provided and the patient/caregiver indicated understanding., Patient/family have participated as able in goal setting and plan of care. , and Patient/family agree to work toward stated goals and plan of care.     Thank you for this referral.  Teodoro Han   Time Calculation: 31 mins

## 2021-01-29 NOTE — PROGRESS NOTES
Problem: Self Care Deficits Care Plan (Adult)  Goal: *Acute Goals and Plan of Care (Insert Text)  Description:   FUNCTIONAL STATUS PRIOR TO ADMISSION: The patient was functional at the wheelchair level and required assist for transfers to the chair and with ADLs. He is s/p a previous right AKA    HOME SUPPORT PRIOR TO ADMISSION: The patient lived with family, daughter and grandchild, who provided assist as needed. Occupational Therapy Goals  Initiated 1/29/2021  1. Patient will perform grooming seated unsupported with contact guard assist within 7 day(s). 2.  Patient will perform lower body dressing supine in bed rolling side to side with minimal assistance/contact guard assist within 7 day(s). 3.  Patient will perform toilet transfers with moderate assistance to drop arm BSC using best technique for safety within 7 day(s). 4.  Patient will perform all aspects of toileting with moderate assistance leaning side to side to manage clothing within 7 day(s). 5.  Patient will participate in upper extremity therapeutic exercise/activities with supervision/set-up for 10 minutes within 7 day(s). 6.  Patient will utilize energy conservation techniques during functional activities with verbal and visual cues within 7 day(s). Outcome: Progressing Towards Goal     OCCUPATIONAL THERAPY EVALUATION  Patient: Eddie Wilhelm (57 y.o. male)  Date: 1/29/2021  Primary Diagnosis: Acute upper GI bleed [K92.2]  Procedure(s) (LRB):  LEFT ABOVE THE KNEE AMPUTATION(URGENT) (Left) 2 Days Post-Op   Precautions: Fall, Skin    ASSESSMENT  Based on the objective data described below, the patient presents with demanding affect, decreased strength, endurance, mobility, balance in unsupported sitting and safety POD 2 L AKA. Pt wih h/o R AKA. He requires encouragement for active participation and physical assist for ADLs and functional mobility. Recommend rehab a discharge.     Current Level of Function Impacting Discharge (ADLs/self-care): max A LE ADLs and toileting, max A T and lateral transfers    Functional Outcome Measure: The patient scored Total: 40/100 on the Barthel Index outcome measure. Other factors to consider for discharge: see above     Patient will benefit from skilled therapy intervention to address the above noted impairments. PLAN :  Recommendations and Planned Interventions: self care training, functional mobility training, therapeutic exercise, balance training, therapeutic activities, endurance activities, neuromuscular re-education, patient education, home safety training, and family training/education    Frequency/Duration: Patient will be followed by occupational therapy 5 times a week to address goals. Recommendation for discharge: (in order for the patient to meet his/her long term goals)  Therapy up to 5 days/week in SNF setting    This discharge recommendation:  Has not yet been discussed the attending provider and/or case management    IF patient discharges home will need the following DME: TBD       SUBJECTIVE:   Patient stated Change this diaper.     OBJECTIVE DATA SUMMARY:   HISTORY:   Past Medical History:   Diagnosis Date    Atrial fibrillation ([facility]     Hypertension     MI (myocardial infarction) ([facility]     [facility]      Past Surgical History:   Procedure Laterality Date    HX ABOVE KNEE AMPUTATION Right     HX ABOVE KNEE AMPUTATION Right     HX HEART CATHETERIZATION         Expanded or extensive additional review of patient history:     Home Situation  Home Environment: Private residence  # Steps to Enter: 2  Wheelchair Ramp: No  Living Alone: No  Support Systems: Family member(s)  Current DME Used/Available at Home: Wheelchair, Commode, bedside    Hand dominance: Right    EXAMINATION OF PERFORMANCE DEFICITS:  Cognitive/Behavioral Status:  Neurologic State: Alert; Appropriate for age  Orientation Level: Oriented X4  Cognition: Appropriate decision making; Appropriate for age attention/concentration; Follows commands  Perception: Appears intact  Perseveration: No perseveration noted  Safety/Judgement: Awareness of environment; Fall prevention; Insight into deficits    Hearing: Auditory  Auditory Impairment: None    Vision/Perceptual:    Acuity: Able to read clock/calendar on wall without difficulty         Range of Motion:  AROM: Generally decreased, functional  PROM: Generally decreased, functional                      Strength:  Strength: Generally decreased, functional                Coordination:  Coordination: Generally decreased, functional  Fine Motor Skills-Upper: Left Impaired;Right Impaired    Gross Motor Skills-Upper: Left Intact; Right Intact    Tone & Sensation:  Tone: Normal  Sensation: (pt guarding of LLE, light touch not attempted )                      Balance:  Sitting: Impaired  Sitting - Static: Fair (occasional)  Sitting - Dynamic: Fair (occasional)    Functional Mobility and Transfers for ADLs:  Bed Mobility:  Supine to Sit: Moderate assistance  Scooting: Minimum assistance; Additional time;Assist x1(frequent rest breaks)    Transfers:  Sit to Stand: (B AKAs)  Bed to Chair: Additional time; Moderate assistance;Assist x1  Toilet Transfer : Maximum assistance; Additional time;Assist x1(drop arm BSC)  Assistive Device : Gait Belt    ADL Assessment and intervention:  Feeding: Setup; Additional time(A to open containers)    Oral Facial Hygiene/Grooming: Additional time;Assist x1;Setup(A to open containers)    Bathing: Minimum assistance; Additional time;Assist x1(bed level rolling side to side- A for cleanliness)    Upper Body Dressing: Additional time;Assist x1; Moderate assistance(for safey with unsupported sitting balance)    Lower Body Dressing: Moderate assistance; Additional time;Assist x1(supine rolling side to side)    Toileting: Maximum assistance; Additional time;Assist x1      Cognitive Retraining  Safety/Judgement: Awareness of environment; Fall prevention; Insight into deficits    Functional Measure:  Barthel Index:    Bathin  Bladder: 5  Bowels: 5  Groomin  Dressin  Feeding: 10  Mobility: 5  Stairs: 0  Toilet Use: 5  Transfer (Bed to Chair and Back): 5  Total: 40/100        The Barthel ADL Index: Guidelines  1. The index should be used as a record of what a patient does, not as a record of what a patient could do. 2. The main aim is to establish degree of independence from any help, physical or verbal, however minor and for whatever reason. 3. The need for supervision renders the patient not independent. 4. A patient's performance should be established using the best available evidence. Asking the patient, friends/relatives and nurses are the usual sources, but direct observation and common sense are also important. However direct testing is not needed. 5. Usually the patient's performance over the preceding 24-48 hours is important, but occasionally longer periods will be relevant. 6. Middle categories imply that the patient supplies over 50 per cent of the effort. 7. Use of aids to be independent is allowed. Kelechi Cruz., Barthel, D.W. (5644). Functional evaluation: the Barthel Index. [facility] (14)2. Lulu Riding lou PIETRO Warner, Sandi Childs., Rasheeda Carballo., [place], [address] [place] [address] (). Measuring the change indisability after inpatient rehabilitation; comparison of the responsiveness of the Barthel Index and Functional Sargent Measure. Journal of Neurology, Neurosurgery, and Psychiatry, 66(4), 180-464. Janel Suarez, N.J.A, Aidan Almeida  WDomJ.GANESH, & Genna Johansen, M.A. (2004.) Assessment of post-stroke quality of life in cost-effectiveness studies: The usefulness of the Barthel Index and the EuroQoL-5D.  Quality of Life Research, 15, 236-39         Occupational Therapy Evaluation Charge Determination   History Examination Decision-Making   LOW Complexity : Brief history review  HIGH Complexity : 5 or more performance deficits relating to physical, cognitive , or psychosocial skils that result in activity limitations and / or participation restrictions MEDIUM Complexity : Patient may present with comorbidities that affect occupational performnce. Miniml to moderate modification of tasks or assistance (eg, physical or verbal ) with assesment(s) is necessary to enable patient to complete evaluation       Based on the above components, the patient evaluation is determined to be of the following complexity level: LOW   Pain Ratin/10    Activity Tolerance:   Fair and requires frequent rest breaks    After treatment patient left in no apparent distress:    Supine in bed and Call bell within reach    COMMUNICATION/EDUCATION:   The patients plan of care was discussed with: Physical therapist and Registered nurse. Home safety education was provided and the patient/caregiver indicated understanding., Patient/family have participated as able in goal setting and plan of care. , and Patient/family agree to work toward stated goals and plan of care. This patients plan of care is appropriate for delegation to [facility].     Thank you for this referral.  Terry Smith OT  Time Calculation: 24 mins

## 2021-01-29 NOTE — PROGRESS NOTES
Bedside and Verbal shift change report given to Darlene (oncoming nurse) by Regla Dang (offgoing nurse). Report included the following information SBAR, Kardex, MAR and Accordion.

## 2021-01-29 NOTE — PROGRESS NOTES
For the weekend, recommend patient to complete as able in order to maintain strength, endurance and independence with nursing: OOB to chair 3x/day using a T transfer. . PT to follow-up with patient after the weekend.  Blanquita Matthews, PT

## 2021-01-29 NOTE — PROGRESS NOTES
Transition of Care  1. Disposition- PT will possibly be D/C to SNF, within 24-48hrs. -PT has been medically cleared for D/C, following Negative COVID test.    --Pt Pending PT/OT consult   -CM referred pt to SNF \"[facility]\". CM: [address]. MSW,   [phone]    11;42am    CM consulted with rep from, [address] [place] [address],[address], [facility]. PT has been accepted pending Negative COVID screening. PT expected D/C, tomorrow to SNF. CM confirmed PT has an active [facility]. PT is on will call with [facility] transport. CM: [address]. MSW,   [phone]          4:08pm    CM consulted with rep from [address] [place] [address],[address], [place]. The Rep inquired about the PT anticipated DC time and DAY. CM informed the rep that the client would be discharged tomorrow and a time would be given once, a negative COVID test is received. [place] Rep instructed the CM to contact her via. Cell [phone], when COVID results are received and a time has been determined.         CM: [address]. MSW,   [phone] Name band;

## 2021-01-30 LAB
ANION GAP SERPL CALC-SCNC: 3 MMOL/L (ref 5–15)
BUN SERPL-MCNC: 14 MG/DL (ref 6–20)
BUN/CREAT SERPL: 26 (ref 12–20)
CALCIUM SERPL-MCNC: 8.9 MG/DL (ref 8.5–10.1)
CHLORIDE SERPL-SCNC: 104 MMOL/L (ref 97–108)
CO2 SERPL-SCNC: 30 MMOL/L (ref 21–32)
CREAT SERPL-MCNC: 0.54 MG/DL (ref 0.7–1.3)
ERYTHROCYTE [DISTWIDTH] IN BLOOD BY AUTOMATED COUNT: 18.4 % (ref 11.5–14.5)
GLUCOSE SERPL-MCNC: 127 MG/DL (ref 65–100)
HCT VFR BLD AUTO: 24.8 % (ref 36.6–50.3)
HGB BLD-MCNC: 7.5 G/DL (ref 12.1–17)
HISTORY CHECKED?,CKHIST: NORMAL
MAGNESIUM SERPL-MCNC: 1.9 MG/DL (ref 1.6–2.4)
MCH RBC QN AUTO: 29.1 PG (ref 26–34)
MCHC RBC AUTO-ENTMCNC: 30.2 G/DL (ref 30–36.5)
MCV RBC AUTO: 96.1 FL (ref 80–99)
NRBC # BLD: 0 K/UL (ref 0–0.01)
NRBC BLD-RTO: 0 PER 100 WBC
PHOSPHATE SERPL-MCNC: 2.2 MG/DL (ref 2.6–4.7)
PLATELET # BLD AUTO: 383 K/UL (ref 150–400)
PMV BLD AUTO: 9.6 FL (ref 8.9–12.9)
POTASSIUM SERPL-SCNC: 4.2 MMOL/L (ref 3.5–5.1)
RBC # BLD AUTO: 2.58 M/UL (ref 4.1–5.7)
SARS-COV-2, XPLCVT: NOT DETECTED
SODIUM SERPL-SCNC: 137 MMOL/L (ref 136–145)
SOURCE, COVRS: NORMAL
WBC # BLD AUTO: 8.9 K/UL (ref 4.1–11.1)

## 2021-01-30 PROCEDURE — 36415 COLL VENOUS BLD VENIPUNCTURE: CPT

## 2021-01-30 PROCEDURE — 74011000250 HC RX REV CODE- 250: Performed by: INTERNAL MEDICINE

## 2021-01-30 PROCEDURE — 86923 COMPATIBILITY TEST ELECTRIC: CPT

## 2021-01-30 PROCEDURE — 74011250637 HC RX REV CODE- 250/637: Performed by: INTERNAL MEDICINE

## 2021-01-30 PROCEDURE — 80048 BASIC METABOLIC PNL TOTAL CA: CPT

## 2021-01-30 PROCEDURE — 83735 ASSAY OF MAGNESIUM: CPT

## 2021-01-30 PROCEDURE — 85027 COMPLETE CBC AUTOMATED: CPT

## 2021-01-30 PROCEDURE — 86901 BLOOD TYPING SEROLOGIC RH(D): CPT

## 2021-01-30 PROCEDURE — 74011250636 HC RX REV CODE- 250/636: Performed by: INTERNAL MEDICINE

## 2021-01-30 PROCEDURE — 74011250637 HC RX REV CODE- 250/637: Performed by: HOSPITALIST

## 2021-01-30 PROCEDURE — C9113 INJ PANTOPRAZOLE SODIUM, VIA: HCPCS | Performed by: INTERNAL MEDICINE

## 2021-01-30 PROCEDURE — P9016 RBC LEUKOCYTES REDUCED: HCPCS

## 2021-01-30 PROCEDURE — 36430 TRANSFUSION BLD/BLD COMPNT: CPT

## 2021-01-30 PROCEDURE — 84100 ASSAY OF PHOSPHORUS: CPT

## 2021-01-30 PROCEDURE — 65660000000 HC RM CCU STEPDOWN

## 2021-01-30 RX ORDER — SODIUM CHLORIDE 9 MG/ML
250 INJECTION, SOLUTION INTRAVENOUS AS NEEDED
Status: DISCONTINUED | OUTPATIENT
Start: 2021-01-30 | End: 2021-02-02 | Stop reason: HOSPADM

## 2021-01-30 RX ADMIN — HYDRALAZINE HYDROCHLORIDE 25 MG: 50 TABLET, FILM COATED ORAL at 10:10

## 2021-01-30 RX ADMIN — CARVEDILOL 6.25 MG: 6.25 TABLET, FILM COATED ORAL at 16:38

## 2021-01-30 RX ADMIN — Medication 10 ML: at 16:38

## 2021-01-30 RX ADMIN — SODIUM CHLORIDE 40 MG: 9 INJECTION INTRAMUSCULAR; INTRAVENOUS; SUBCUTANEOUS at 22:48

## 2021-01-30 RX ADMIN — FERROUS SULFATE TAB 325 MG (65 MG ELEMENTAL FE) 325 MG: 325 (65 FE) TAB at 07:31

## 2021-01-30 RX ADMIN — ATORVASTATIN CALCIUM 40 MG: 40 TABLET, FILM COATED ORAL at 10:10

## 2021-01-30 RX ADMIN — LORATADINE 10 MG: 10 TABLET ORAL at 10:10

## 2021-01-30 RX ADMIN — HYDRALAZINE HYDROCHLORIDE 25 MG: 50 TABLET, FILM COATED ORAL at 16:38

## 2021-01-30 RX ADMIN — CARVEDILOL 6.25 MG: 6.25 TABLET, FILM COATED ORAL at 11:06

## 2021-01-30 RX ADMIN — Medication 100 MG: at 10:10

## 2021-01-30 RX ADMIN — HYDRALAZINE HYDROCHLORIDE 25 MG: 50 TABLET, FILM COATED ORAL at 22:48

## 2021-01-30 RX ADMIN — SODIUM CHLORIDE 40 MG: 9 INJECTION INTRAMUSCULAR; INTRAVENOUS; SUBCUTANEOUS at 10:10

## 2021-01-30 RX ADMIN — Medication 10 ML: at 22:48

## 2021-01-30 RX ADMIN — Medication 1 CAPSULE: at 10:09

## 2021-01-30 RX ADMIN — POLYETHYLENE GLYCOL 3350 17 G: 17 POWDER, FOR SOLUTION ORAL at 10:11

## 2021-01-30 RX ADMIN — MULTIPLE VITAMINS W/ MINERALS TAB 1 TABLET: TAB at 10:09

## 2021-01-30 RX ADMIN — LISINOPRIL 10 MG: 10 TABLET ORAL at 10:10

## 2021-01-30 RX ADMIN — Medication 8.6 MG: at 10:10

## 2021-01-30 RX ADMIN — Medication 10 ML: at 08:28

## 2021-01-30 NOTE — PROGRESS NOTES
Transitions of Care    Mr. Hardy Newman was seen. He was informed that I will arrange an ambulance for him for tomorrow at 10:00am.  He was agreeable with the discharge plan. He was asked if he wanted me to inform his family members. He stated that  He had already talked to his daughter and I did not need to call her. [facility] was called. A 10:00am ambulance was arranged for 1/31/2021. The physician and nursing were informed. Nisreen Delvis was called with [facility]. ([phone]) She was informed that Mr. Hardy Newman was coming tomorrow at 10:00am.  She needs the discharge summary fax to the facility tomorrow. The fax number is [phone]. The discharge packet was initiated. It was placed on the floor. Will continue to follow for discharge planning.   Signed By: Hamzah Gentile LCSW     January 30, 2021

## 2021-01-30 NOTE — PROGRESS NOTES
[address]  [address] [address] [address], [address]  [phone]                GI PROGRESS NOTE      NAME:   Hussein Whitaker   :    1955   MRN:    [id number]     Assessment/Plan   Heme positive stool/ Anemia: Hgb drifting down without overt bleeding  -I explained to the patient that this could be slow blood loss from the GI tract and the next steps would be doing a colonoscopy and if negative, doing a M2A capsule study. Patient is very adamant and does not want any endoscopy procedures.  -Continue to monitor H/H, transfuse prn  -Continue BID PPI with Protonix   Will follow prn     Patient Active Problem List   Diagnosis Code    Sepsis ([facility] A41.9    Lactic acid increased E87.2    Hypotension I95.9    Wound, open, foot S91.309A    Hx of CABG Z95.1    CAD (coronary artery disease) I25.10    Hyperlipidemia, mixed E78.2    Depression, major F32.9    HTN (hypertension), benign I10    Tachycardia R00.0    Chronic atrial fibrillation (HCC) I48.20    COPD [facility]ch[facility] obstructive [facility]) [facility] J44.9    CHF (congestive heart failure) ([facility]) I50.9    CKD (chronic kidney disease) N18.9    Anemia D64.9    Acute upper GI bleed K92.2       Subjective:     Hussein Whitaker is a 77 y.o.  male who was admitted with symptomatic anemia. We were reconsulted as the hemoglobin has slowly been going down. Patient does not have any overt bleeding. Review of Systems    Constitutional: negative fever, negative chills, negative weight loss  Eyes:   negative visual changes  ENT:   negative sore throat, tongue or lip swelling  Respiratory:  negative cough, negative dyspnea  Cards:  negative for chest pain, palpitations, lower extremity edema  GI:   See HPI      Objective:     VITALS:   Last 24hrs VS reviewed since prior hospitalist progress note.  Most recent are:  Visit Vitals  /75   Pulse (!) 110   Temp 98.2 °F (36.8 °C)   Resp 20   Ht 5' 7\" (1.702 m)   Wt 35.5 kg (78 lb 4.2 oz) SpO2 100%   BMI 12.26 kg/m²       Intake/Output Summary (Last 24 hours) at 1/30/2021 1155  Last data filed at 1/30/2021 0842  Gross per 24 hour   Intake 960 ml   Output 500 ml   Net 460 ml        PHYSICAL EXAM:  General   well developed, well nourished, appears stated age, in no acute distress  EENT  Normocephalic, Atraumatic, PERRLA, EOMI, sclera clear, nares clear, pharynx normal  Neck   Supple without nodes or mass.  No thyromegaly or bruit  Respiratory   Clear To Auscultation bilaterally - no wheezes, rales, rhonchi, or crackles  Cardiology  Regular Rate and Rythmn  - no murmurs, rubs or gallops  Abdominal  Soft, non-tender, PEG tube upper abdomen, non-distended, positive bowel sounds, no hepatosplenomegaly, no palpable mass       Lab Data   Recent Results (from the past 12 hour(s))   CBC W/O DIFF    Collection Time: 01/30/21  3:53 AM   Result Value Ref Range    WBC 8.9 4.1 - 11.1 K/uL    RBC 2.58 (L) 4.10 - 5.70 M/uL    HGB 7.5 (L) 12.1 - 17.0 g/dL    HCT 24.8 (L) 36.6 - 50.3 %    MCV 96.1 80.0 - 99.0 FL    MCH 29.1 26.0 - 34.0 PG    MCHC 30.2 30.0 - 36.5 g/dL    RDW 18.4 (H) 11.5 - 14.5 %    PLATELET 797 721 - 909 K/uL    MPV 9.6 8.9 - 12.9 FL    NRBC 0.0 0  WBC    ABSOLUTE NRBC 0.00 0.00 - 0.74 K/uL   METABOLIC PANEL, BASIC    Collection Time: 01/30/21  3:53 AM   Result Value Ref Range    Sodium 137 136 - 145 mmol/L    Potassium 4.2 3.5 - 5.1 mmol/L    Chloride 104 97 - 108 mmol/L    CO2 30 21 - 32 mmol/L    Anion gap 3 (L) 5 - 15 mmol/L    Glucose 127 (H) 65 - 100 mg/dL    BUN 14 6 - 20 MG/DL    Creatinine 0.54 (L) 0.70 - 1.30 MG/DL    BUN/Creatinine ratio 26 (H) 12 - 20      GFR est AA >60 >60 ml/min/1.73m2    GFR est non-AA >60 >60 ml/min/1.73m2    Calcium 8.9 8.5 - 10.1 MG/DL   MAGNESIUM    Collection Time: 01/30/21  3:53 AM   Result Value Ref Range    Magnesium 1.9 1.6 - 2.4 mg/dL   PHOSPHORUS    Collection Time: 01/30/21  3:53 AM   Result Value Ref Range    Phosphorus 2.2 (L) 2.6 - 4.7 MG/DL Medications: Reviewed    PMH/SH reviewed - no change compared to H&P  Attending Physician: Nilda Calix MD   Date/Time:  1/30/2021

## 2021-01-30 NOTE — PROGRESS NOTES
Bedside shift change report given to Lico (oncoming nurse) by Suzette (offgoing nurse). Report included the following information SBAR, Kardex, Procedure Summary, MAR and Recent Results.

## 2021-01-30 NOTE — PROGRESS NOTES
[facility] [facility]  Hospitalist Group                                                                                          Hospitalist Progress Note  DO Garland Irby service: [phone] from in house phone        Date of Service:  2021  NAME:  Barbi Boo  :  1955  MRN:  [id number]      Admission Summary: This is a 58-year-old man with past medical history significant for hypertension; dyslipidemia; peripheral artery disease, status post right above-knee amputation; coronary artery disease, status post CABG; chronic atrial fibrillation; status post CVA; and status post PEG tube placement, who was in his usual state of health until the day of his presentation at the emergency room when it was reported that the patient's hemoglobin was low at the nursing home where the patient presently resides. Hemoglobin was checked at the facility because of his chronic left foot ulcer. The patient was found to have hemoglobin of 6.2. He was sent to [facility] for further evaluation. According to report, the patient used to have wound VAC in the left foot. The wound VAC was removed because of excessive bleeding. When the patient arrived at the emergency room at [facility], the low hemoglobin was confirmed. Blood transfusion was started. The patient was transferred to [facility] for further evaluation. Not only was the low hemoglobin confirmed at the emergency room at [facility], the patient reported that he was passing dark stool and the stool occult blood was positive, and no GI service [facility].  Blood transfusion was started when the patient arrived at [facility] emergency room. The patient has completed 1 unit of packed red blood cells. He was referred to the hospitalist service for evaluation for admission.   He was recently admitted to [facility].  The patient was admitted and treated for sepsis coming from the left leg wound. Following the treatment, he was discharged to  where the patient presently resides. The patient is not a good historian but denies chest pain, fever, rigors, chills.       Interval history / Subjective: Follow up osteomyelitis. Patient seen and examined. No acute complaints. Hgb downtrending. GI consulted. Patient defers further treatment. Discussed with daughter. She with daughter and she will talk with her father. Assessment & Plan:     Infected left foot ulcer stage III/osteomyelitis  -was admitted to [facility] for this and the patient is presently receiving intravenous antibiotics through the PICC line  -Wound culture Pseudomonas/Diphtheroids  -MRI foot 1/23 suggestive of osteomyelitis  -s/p Vanc/cefepime/Doxy, had surgical cure with AKA  -Appreciate ID/Podiatry/Vascular  -s/p Left AKA 1/27    Tachycardia:   -multifactorial, volume depletion vs ongoing GI bleed     GI bleed  Acute blood loss anemia  -Protonix  -Appreciate GI, diagnostic EGD only 1/21 since the patient is on Plavix:  Esophagus:normal  Stomach:PEG tube seen in body, no other lesions seen  Duodenum/jejunum:Two non bleeding AVMs noted in descending duodenum. -GI reconsulted 1/30. Patient defers further procedures at this time  -additional unit 1/30    Hypertension, uncontrolled  -continue carvedilol, hydralazine     Hypokalemia  -replace as needed    Dyslipidemia  -stable    Peripheral artery disease  -status post right above-knee amputation  -Antiplatelet therapy medication is on hold due to acute GI bleed   -Appreciate vascular surgery    Coronary artery disease, status post CABG/Chronic atrial fibrillation  -Continue home meds  -Antiplatelets on hold    S/p PEG tube placement:  The patient has no difficulties in swallowing.   The PEG tube was placed so that the patient can receive supplemental feeding.  -appreciate nutrition, continue night feeds     Mechanical soft diet    PT/OT    Code status: FULL CODE  DVT prophylaxis: No anticoagulation sec to GI bleed  Spoke with patient's daughter Bakari Reeves (medical POA) [phone]    Care Plan discussed with: Patient/Family  Anticipated Disposition: TBD  Anticipated Discharge: 1-2 days     Hospital Problems  Date Reviewed: 1/21/2021          Codes Class Noted POA    * (Principal) Acute upper GI bleed ICD-10-CM: K92.2  ICD-9-CM: 578.9  1/21/2021 Yes                Review of Systems:   Negative unless stated above       Vital Signs:    Last 24hrs VS reviewed since prior progress note. Most recent are:  Visit Vitals  /81   Pulse (!) 108   Temp 98.2 °F (36.8 °C)   Resp 18   Ht 5' 7\" (1.702 m)   Wt 35.5 kg (78 lb 4.2 oz)   SpO2 100%   BMI 12.26 kg/m²         Intake/Output Summary (Last 24 hours) at 1/30/2021 1230  Last data filed at 1/30/2021 6096  Gross per 24 hour   Intake 960 ml   Output 500 ml   Net 460 ml        Physical Examination:     I had a face to face encounter with this patient and independently examined them on 1/30/2021 as outlined below:          Constitutional:  No acute distress   ENT:  Oral mucosa moist, oropharynx benign. Resp:  CTA bilaterally. No wheezing/rhonchi/rales. No accessory muscle use   CV:  tachycardic, no murmurs, gallops, rubs    GI:  Soft, non distended, non tender. normoactive bowel sounds, no hepatosplenomegaly, PEG tube    Musculoskeletal:  No edema, warm, 2+ pulses throughout, right and left AKA    Neurologic: AAOx3     Psych:  Good insight, Not anxious nor agitated.        Data Review:    Review and/or order of clinical lab test      Labs:     Recent Labs     01/30/21  0353 01/29/21  0514   WBC 8.9 10.5   HGB 7.5* 7.7*   HCT 24.8* 24.7*    374     Recent Labs     01/30/21  0353 01/29/21  0514 01/28/21  0428    137 134*   K 4.2 4.2 4.4    105 104   CO2 30 30 26   BUN 14 14 14   CREA 0.54* 0.58* 0.63*   * 126* 134*   CA 8.9 9.0 9.5   MG 1.9 1.8  --    PHOS 2.2* 2.4*  --      No results for input(s): ALT, AP, TBIL, TBILI, TP, ALB, GLOB, GGT, AML, LPSE in the last 72 hours. No lab exists for component: SGOT, GPT, AMYP, HLPSE  No results for input(s): INR, PTP, APTT, INREXT, INREXT in the last 72 hours. No results for input(s): FE, TIBC, PSAT, FERR in the last 72 hours. Lab Results   Component Value Date/Time    Folate 10.9 01/21/2021 09:02 AM      No results for input(s): PH, PCO2, PO2 in the last 72 hours. No results for input(s): CPK, CKNDX, TROIQ in the last 72 hours.     No lab exists for component: CPKMB  No results found for: CHOL, CHOLX, CHLST, CHOLV, HDL, HDLP, LDL, LDLC, DLDLP, TGLX, TRIGL, TRIGP, CHHD, CHHDX  Lab Results   Component Value Date/Time    Glucose (POC) 121 (H) 12/07/2020 04:18 PM     Lab Results   Component Value Date/Time    Color Yellow/Straw 12/04/2020 08:27 PM    Appearance Clear 12/04/2020 08:27 PM    Specific gravity 1.015 12/04/2020 08:27 PM    pH (UA) 7.0 12/04/2020 08:27 PM    Protein 30 (A) 12/04/2020 08:27 PM    Glucose Negative 12/04/2020 08:27 PM    Ketone Negative 12/04/2020 08:27 PM    Bilirubin Negative 12/04/2020 08:27 PM    Urobilinogen 0.2 12/04/2020 08:27 PM    Nitrites Negative 12/04/2020 08:27 PM    Leukocyte Esterase Negative 12/04/2020 08:27 PM    Epithelial cells Few 12/04/2020 08:27 PM    Bacteria Negative (A) 12/04/2020 08:27 PM    WBC 0-4 12/04/2020 08:27 PM    RBC 0-5 12/04/2020 08:27 PM         Medications Reviewed:     Current Facility-Administered Medications   Medication Dose Route Frequency    0.9% sodium chloride infusion 250 mL  250 mL IntraVENous PRN    influenza vaccine 2020-21 (6 mos+)(PF) (FLUARIX/FLULAVAL/FLUZONE QUAD) injection 0.5 mL  0.5 mL IntraMUSCular PRIOR TO DISCHARGE    thiamine HCL (B-1) tablet 100 mg  100 mg Oral DAILY    multivitamin, tx-iron-ca-min (THERA-M w/ IRON) tablet 1 Tab  1 Tab Oral DAILY    HYDROmorphone (PF) (DILAUDID) injection 0.5 mg  0.5 mg IntraVENous Q4H PRN    hydrALAZINE (APRESOLINE) 20 mg/mL injection 10 mg  10 mg IntraVENous Q6H PRN    lisinopriL (PRINIVIL, ZESTRIL) tablet 10 mg  10 mg Oral DAILY    atorvastatin (LIPITOR) tablet 40 mg  40 mg Oral DAILY    carvediloL (COREG) tablet 6.25 mg  6.25 mg Oral BID WITH MEALS    ferrous sulfate tablet 325 mg  325 mg Oral ACB    hydrALAZINE (APRESOLINE) tablet 25 mg  25 mg Oral TID    loratadine (CLARITIN) tablet 10 mg  10 mg Oral DAILY    oxyCODONE-acetaminophen (PERCOCET) 5-325 mg per tablet 1 Tab  1 Tab Oral Q4H PRN    polyethylene glycol (MIRALAX) packet 17 g  17 g Oral DAILY    senna (SENOKOT) tablet 8.6 mg  1 Tab Oral DAILY    sodium chloride (NS) flush 5-40 mL  5-40 mL IntraVENous Q8H    sodium chloride (NS) flush 5-40 mL  5-40 mL IntraVENous PRN    acetaminophen (TYLENOL) tablet 650 mg  650 mg Oral Q6H PRN    Or    acetaminophen (TYLENOL) suppository 650 mg  650 mg Rectal Q6H PRN    promethazine (PHENERGAN) tablet 12.5 mg  12.5 mg Oral Q6H PRN    Or    ondansetron (ZOFRAN) injection 4 mg  4 mg IntraVENous Q6H PRN    lactobac ac& pc-s.therm-b.anim (MELODY Q/RISAQUAD)  1 Cap Oral DAILY    pantoprazole (PROTONIX) 40 mg in 0.9% sodium chloride 10 mL injection  40 mg IntraVENous Q12H     ______________________________________________________________________  EXPECTED LENGTH OF STAY: 4d 9h  ACTUAL LENGTH OF STAY:          [address] [place] [address],

## 2021-01-30 NOTE — PROGRESS NOTES
Bedside and Verbal shift change report given to Saturnino Mills (oncoming nurse) by Jaci Younger RN (offgoing nurse). Report included the following information SBAR, Kardex, ED Summary, Procedure Summary, Intake/Output, MAR, Recent Results and Cardiac Rhythm Sinus Tach.

## 2021-01-30 NOTE — PROGRESS NOTES
Transitions of Care    Michelle was called with [facility]. They need the COVID 19 results. The report was faxed to them. Fax # [phone])  They have a bed for Mr. Jason Richard. He will be going to bed 35. They asked if he was going to be discharged today. Jewels White was told that I will find out for her. Will continue to follow for discharge planning.   Signed By: Alicia Yost LCSW     January 30, 2021

## 2021-01-31 LAB
ABO + RH BLD: NORMAL
ANION GAP SERPL CALC-SCNC: 6 MMOL/L (ref 5–15)
BLD PROD TYP BPU: NORMAL
BLOOD GROUP ANTIBODIES SERPL: NORMAL
BPU ID: NORMAL
BUN SERPL-MCNC: 15 MG/DL (ref 6–20)
BUN/CREAT SERPL: 26 (ref 12–20)
CALCIUM SERPL-MCNC: 9 MG/DL (ref 8.5–10.1)
CHLORIDE SERPL-SCNC: 105 MMOL/L (ref 97–108)
CO2 SERPL-SCNC: 27 MMOL/L (ref 21–32)
CREAT SERPL-MCNC: 0.58 MG/DL (ref 0.7–1.3)
CROSSMATCH RESULT,%XM: NORMAL
ERYTHROCYTE [DISTWIDTH] IN BLOOD BY AUTOMATED COUNT: 17.3 % (ref 11.5–14.5)
GLUCOSE SERPL-MCNC: 166 MG/DL (ref 65–100)
HCT VFR BLD AUTO: 31.5 % (ref 36.6–50.3)
HGB BLD-MCNC: 9.9 G/DL (ref 12.1–17)
MAGNESIUM SERPL-MCNC: 2.2 MG/DL (ref 1.6–2.4)
MCH RBC QN AUTO: 29.3 PG (ref 26–34)
MCHC RBC AUTO-ENTMCNC: 31.4 G/DL (ref 30–36.5)
MCV RBC AUTO: 93.2 FL (ref 80–99)
NRBC # BLD: 0 K/UL (ref 0–0.01)
NRBC BLD-RTO: 0 PER 100 WBC
PHOSPHATE SERPL-MCNC: 2.5 MG/DL (ref 2.6–4.7)
PLATELET # BLD AUTO: 403 K/UL (ref 150–400)
PMV BLD AUTO: 9.6 FL (ref 8.9–12.9)
POTASSIUM SERPL-SCNC: 4.3 MMOL/L (ref 3.5–5.1)
RBC # BLD AUTO: 3.38 M/UL (ref 4.1–5.7)
SODIUM SERPL-SCNC: 138 MMOL/L (ref 136–145)
SPECIMEN EXP DATE BLD: NORMAL
STATUS OF UNIT,%ST: NORMAL
UNIT DIVISION, %UDIV: 0
WBC # BLD AUTO: 10.3 K/UL (ref 4.1–11.1)

## 2021-01-31 PROCEDURE — 80048 BASIC METABOLIC PNL TOTAL CA: CPT

## 2021-01-31 PROCEDURE — 74011000250 HC RX REV CODE- 250: Performed by: INTERNAL MEDICINE

## 2021-01-31 PROCEDURE — 83735 ASSAY OF MAGNESIUM: CPT

## 2021-01-31 PROCEDURE — 74011250637 HC RX REV CODE- 250/637: Performed by: HOSPITALIST

## 2021-01-31 PROCEDURE — 65660000000 HC RM CCU STEPDOWN

## 2021-01-31 PROCEDURE — 85027 COMPLETE CBC AUTOMATED: CPT

## 2021-01-31 PROCEDURE — 74011250636 HC RX REV CODE- 250/636: Performed by: INTERNAL MEDICINE

## 2021-01-31 PROCEDURE — C9113 INJ PANTOPRAZOLE SODIUM, VIA: HCPCS | Performed by: INTERNAL MEDICINE

## 2021-01-31 PROCEDURE — 74011250637 HC RX REV CODE- 250/637: Performed by: INTERNAL MEDICINE

## 2021-01-31 PROCEDURE — 84100 ASSAY OF PHOSPHORUS: CPT

## 2021-01-31 PROCEDURE — 36415 COLL VENOUS BLD VENIPUNCTURE: CPT

## 2021-01-31 PROCEDURE — 94760 N-INVAS EAR/PLS OXIMETRY 1: CPT

## 2021-01-31 RX ORDER — POLYETHYLENE GLYCOL 3350, SODIUM SULFATE, SODIUM CHLORIDE, POTASSIUM CHLORIDE, SODIUM ASCORBATE, AND ASCORBIC ACID 7.5-2.691G
2 KIT ORAL ONCE
Status: COMPLETED | OUTPATIENT
Start: 2021-01-31 | End: 2021-01-31

## 2021-01-31 RX ORDER — POLYETHYLENE GLYCOL 3350, SODIUM SULFATE ANHYDROUS, SODIUM BICARBONATE, SODIUM CHLORIDE, POTASSIUM CHLORIDE 236; 22.74; 6.74; 5.86; 2.97 G/4L; G/4L; G/4L; G/4L; G/4L
4000 POWDER, FOR SOLUTION ORAL ONCE
Status: DISCONTINUED | OUTPATIENT
Start: 2021-01-31 | End: 2021-01-31 | Stop reason: CLARIF

## 2021-01-31 RX ADMIN — OXYCODONE HYDROCHLORIDE AND ACETAMINOPHEN 1 TABLET: 5; 325 TABLET ORAL at 21:35

## 2021-01-31 RX ADMIN — OXYCODONE HYDROCHLORIDE AND ACETAMINOPHEN 1 TABLET: 5; 325 TABLET ORAL at 04:32

## 2021-01-31 RX ADMIN — SODIUM CHLORIDE 40 MG: 9 INJECTION INTRAMUSCULAR; INTRAVENOUS; SUBCUTANEOUS at 09:21

## 2021-01-31 RX ADMIN — PEG-3350, SODIUM SULFATE, SODIUM CHLORIDE, POTASSIUM CHLORIDE, SODIUM ASCORBATE AND ASCORBIC ACID 2 L: KIT at 18:04

## 2021-01-31 RX ADMIN — Medication 100 MG: at 09:22

## 2021-01-31 RX ADMIN — SODIUM CHLORIDE 40 MG: 9 INJECTION INTRAMUSCULAR; INTRAVENOUS; SUBCUTANEOUS at 21:26

## 2021-01-31 RX ADMIN — Medication 10 ML: at 07:06

## 2021-01-31 RX ADMIN — Medication 1 CAPSULE: at 09:22

## 2021-01-31 RX ADMIN — Medication 10 ML: at 21:33

## 2021-01-31 RX ADMIN — CARVEDILOL 6.25 MG: 6.25 TABLET, FILM COATED ORAL at 17:37

## 2021-01-31 RX ADMIN — LORATADINE 10 MG: 10 TABLET ORAL at 09:23

## 2021-01-31 RX ADMIN — HYDRALAZINE HYDROCHLORIDE 25 MG: 50 TABLET, FILM COATED ORAL at 09:23

## 2021-01-31 RX ADMIN — MULTIPLE VITAMINS W/ MINERALS TAB 1 TABLET: TAB at 09:23

## 2021-01-31 RX ADMIN — HYDRALAZINE HYDROCHLORIDE 25 MG: 50 TABLET, FILM COATED ORAL at 21:27

## 2021-01-31 RX ADMIN — LISINOPRIL 10 MG: 10 TABLET ORAL at 09:23

## 2021-01-31 RX ADMIN — CARVEDILOL 6.25 MG: 6.25 TABLET, FILM COATED ORAL at 09:23

## 2021-01-31 RX ADMIN — ATORVASTATIN CALCIUM 40 MG: 40 TABLET, FILM COATED ORAL at 09:23

## 2021-01-31 RX ADMIN — Medication 10 ML: at 15:55

## 2021-01-31 RX ADMIN — HYDRALAZINE HYDROCHLORIDE 25 MG: 50 TABLET, FILM COATED ORAL at 17:37

## 2021-01-31 NOTE — PROGRESS NOTES
[address]  [address] [facility] [address], [address]  [phone]                GI PROGRESS NOTE      NAME:   Luis Dodson   :    1955   MRN:    [id number]     Assessment/Plan   Anemia secondary to chronic blood loss-hemoglobin improved after transfusion  Colonoscopy tomorrow  Clear liquid diet today and prep in the evening. Patient Active Problem List   Diagnosis Code    Sepsis ([facility] [address] A41.9    Lactic acid increased E87.2    Hypotension I95.9    Wound, open, foot S91.309A    Hx of CABG Z95.1    CAD (coronary artery disease) I25.10    Hyperlipidemia, mixed E78.2    Depression, major F32.9    HTN (hypertension), benign I10    Tachycardia R00.0    Chronic atrial fibrillation ([facility]) I48.20    COPD (chronic obstructive pulmonary disease) ([facility]) J44.9    CHF (congestive heart failure) ([facility]) I50.9    CKD (chronic kidney disease) N18.9    Anemia D64.9    Acute upper GI bleed K92.2       Subjective:     Luis Found is a 77 y.o.  male who did not have any acute events overnight. Received transfusion and hemoglobin improved appropriately. Review of Systems    Constitutional: negative fever, negative chills, negative weight loss  Eyes:   negative visual changes  ENT:   negative sore throat, tongue or lip swelling  Respiratory:  negative cough, negative dyspnea  Cards:  negative for chest pain, palpitations, lower extremity edema  GI:   See HPI  :  negative for frequency, dysuria  Integument:  negative for rash and pruritus  Heme:  negative for easy bruising and gum/nose bleeding  Musculoskel: negative for myalgias,  back pain and muscle weakness  Neuro: negative for headaches, dizziness, vertigo  Psych:  negative for feelings of anxiety, depression           Objective:     VITALS:   Last 24hrs VS reviewed since prior hospitalist progress note.  Most recent are:  Visit Vitals  BP (P) 115/70 (BP 1 Location: Left upper arm, BP Patient Position: Supine)   Pulse (!) (P) 102 Temp (P) 98.3 °F (36.8 °C)   Resp (P) 16   Ht 5' 7\" (1.702 m)   Wt 34.3 kg (75 lb 9.9 oz)   SpO2 (P) 98%   BMI 11.84 kg/m²       Intake/Output Summary (Last 24 hours) at 1/31/2021 1202  Last data filed at 1/30/2021 2130  Gross per 24 hour   Intake 650.8 ml   Output 300 ml   Net 350.8 ml        PHYSICAL EXAM:  General   well developed, well nourished, appears stated age, in no acute distress  EENT  Normocephalic, Atraumatic, PERRLA, EOMI, sclera clear, nares clear, pharynx normal  Neck   Supple without nodes or mass.  No thyromegaly or bruit  Respiratory   Clear To Auscultation bilaterally - no wheezes, rales, rhonchi, or crackles  Cardiology  Regular Rate and Rythmn  - no murmurs, rubs or gallops  Abdominal  Soft, non-tender, G-tube in place, non-distended, positive bowel sounds, no hepatosplenomegaly, no palpable mass       Lab Data   Recent Results (from the past 12 hour(s))   CBC W/O DIFF    Collection Time: 01/31/21  4:39 AM   Result Value Ref Range    WBC 10.3 4.1 - 11.1 K/uL    RBC 3.38 (L) 4.10 - 5.70 M/uL    HGB 9.9 (L) 12.1 - 17.0 g/dL    HCT 31.5 (L) 36.6 - 50.3 %    MCV 93.2 80.0 - 99.0 FL    MCH 29.3 26.0 - 34.0 PG    MCHC 31.4 30.0 - 36.5 g/dL    RDW 17.3 (H) 11.5 - 14.5 %    PLATELET 883 (H) 626 - 400 K/uL    MPV 9.6 8.9 - 12.9 FL    NRBC 0.0 0  WBC    ABSOLUTE NRBC 0.00 0.00 - 6.25 K/uL   METABOLIC PANEL, BASIC    Collection Time: 01/31/21  4:39 AM   Result Value Ref Range    Sodium 138 136 - 145 mmol/L    Potassium 4.3 3.5 - 5.1 mmol/L    Chloride 105 97 - 108 mmol/L    CO2 27 21 - 32 mmol/L    Anion gap 6 5 - 15 mmol/L    Glucose 166 (H) 65 - 100 mg/dL    BUN 15 6 - 20 MG/DL    Creatinine 0.58 (L) 0.70 - 1.30 MG/DL    BUN/Creatinine ratio 26 (H) 12 - 20      GFR est AA >60 >60 ml/min/1.73m2    GFR est non-AA >60 >60 ml/min/1.73m2    Calcium 9.0 8.5 - 10.1 MG/DL   MAGNESIUM    Collection Time: 01/31/21  4:39 AM   Result Value Ref Range    Magnesium 2.2 1.6 - 2.4 mg/dL   PHOSPHORUS Collection Time: 01/31/21  4:39 AM   Result Value Ref Range    Phosphorus 2.5 (L) 2.6 - 4.7 MG/DL         Medications: Reviewed    PMH/ reviewed - no change compared to H&P  Attending Physician: Reena Pelaez MD   Date/Time:  1/31/2021

## 2021-01-31 NOTE — PROGRESS NOTES
Bedside and Verbal shift change report given to The Macy (oncoming nurse) by Ariel Valencia RN (offgoing nurse). Report included the following information SBAR, Kardex, ED Summary, Procedure Summary, Intake/Output, MAR, Recent Results and Cardiac Rhythm Sinus Tach.

## 2021-01-31 NOTE — PROGRESS NOTES
1400: Updates patient's daughter, Bria.     1930: Bedside shift change report given to Cheri (oncoming nurse) by Alvina (offgoing nurse). Report included the following information SBAR, Kardex, Procedure Summary, Intake/Output, MAR, Recent Results and Cardiac Rhythm ST.

## 2021-01-31 NOTE — PROGRESS NOTES
Bedside and Verbal shift change report given to Genesis Jennings RN (oncoming nurse) by Hardik Gill RN (offgoing nurse).  Report included the following information SBAR, Kardex, ED Summary, MAR, Recent Results and Cardiac Rhythm ST.

## 2021-01-31 NOTE — PROGRESS NOTES
Problem: Falls - Risk of  Goal: *Absence of Falls  Description: Document Kim Rivera Fall Risk and appropriate interventions in the flowsheet. Outcome: Progressing Towards Goal  Note: Fall Risk Interventions:  Mobility Interventions: Bed/chair exit alarm, Strengthening exercises (ROM-active/passive)         Medication Interventions: Bed/chair exit alarm, Evaluate medications/consider consulting pharmacy    Elimination Interventions: Bed/chair exit alarm, Call light in reach              Problem: Patient Education: Go to Patient Education Activity  Goal: Patient/Family Education  Outcome: Progressing Towards Goal     Problem: Pressure Injury - Risk of  Goal: *Prevention of pressure injury  Description: Document Jhonny Scale and appropriate interventions in the flowsheet.   Outcome: Progressing Towards Goal  Note: Pressure Injury Interventions:  Sensory Interventions: Assess changes in LOC, Maintain/enhance activity level, Minimize linen layers    Moisture Interventions: Absorbent underpads, Apply protective barrier, creams and emollients, Limit adult briefs    Activity Interventions: Assess need for specialty bed, Increase time out of bed, PT/OT evaluation    Mobility Interventions: Assess need for specialty bed, HOB 30 degrees or less    Nutrition Interventions: Document food/fluid/supplement intake    Friction and Shear Interventions: Apply protective barrier, creams and emollients, HOB 30 degrees or less                Problem: Patient Education: Go to Patient Education Activity  Goal: Patient/Family Education  Outcome: Progressing Towards Goal     Problem: Nutrition Deficit  Goal: *Optimize nutritional status  Outcome: Progressing Towards Goal     Problem: Discharge Planning  Goal: *Discharge to safe environment  Outcome: Progressing Towards Goal  Goal: *Knowledge of medication management  Outcome: Progressing Towards Goal  Goal: *Knowledge of discharge instructions  Outcome: Progressing Towards Goal     Problem: Patient Education: Go to Patient Education Activity  Goal: Patient/Family Education  Outcome: Progressing Towards Goal     Problem: Patient Education: Go to Patient Education Activity  Goal: Patient/Family Education  Outcome: Progressing Towards Goal     Problem: Patient Education: Go to Patient Education Activity  Goal: Patient/Family Education  Outcome: Progressing Towards Goal

## 2021-01-31 NOTE — PROGRESS NOTES
[address] [facility]  Hospitalist Group                                                                                          Hospitalist Progress Note  [address] [facility],   Answering service: [phone] from in house phone        Date of Service:  2021  NAME:  Ignacio Castellanos  :  1955  MRN:  [id number]      Admission Summary: This is a 24-year-old man with past medical history significant for hypertension; dyslipidemia; peripheral artery disease, status post right above-knee amputation; coronary artery disease, status post CABG; chronic atrial fibrillation; status post CVA; and status post PEG tube placement, who was in his usual state of health until the day of his presentation at the emergency room when it was reported that the patient's hemoglobin was low at the nursing home where the patient presently resides. Hemoglobin was checked at the facility because of his chronic left foot ulcer. The patient was found to have hemoglobin of 6.2. He was sent to [facility] Emergency Room for further evaluation. According to report, the patient used to have wound VAC in the left foot. The wound VAC was removed because of excessive bleeding. When the patient arrived at the emergency room at [facility], the low hemoglobin was confirmed. Blood transfusion was started. The patient was transferred to [facility] for further evaluation. Not only was the low hemoglobin confirmed at the emergency room at [facility], the patient reported that he was passing dark stool and the stool occult blood was positive, and no GI service [facility].  Blood transfusion was started when the patient arrived at [facility] emergency room. The patient has completed 1 unit of packed red blood cells. He was referred to the hospitalist service for evaluation for admission.   He was recently admitted to [facility].  The patient was admitted and treated for sepsis coming from the left leg wound. Following the treatment, he was discharged to  where the patient presently resides. The patient is not a good historian but denies chest pain, fever, rigors, chills.       Interval history / Subjective: Follow up osteomyelitis. Patient seen and examined. Received 1 unit pRBCs yesterday. No further complaints. Appreciate GI. Plans for colonoscopy. Assessment & Plan:     Infected left foot ulcer stage III/osteomyelitis  -was admitted to [facility] for this and the patient is presently receiving intravenous antibiotics through the PICC line  -Wound culture Pseudomonas/Diphtheroids  -MRI foot 1/23 suggestive of osteomyelitis  -s/p Vanc/cefepime/Doxy, had surgical cure with AKA  -Appreciate ID/Podiatry/Vascular  -s/p Left AKA 1/27    Tachycardia: improved   -multifactorial, volume depletion vs ongoing GI bleed     GI bleed  Acute blood loss anemia  -Protonix  -Appreciate GI, diagnostic EGD only 1/21 since the patient is on Plavix:  Esophagus:normal  Stomach:PEG tube seen in body, no other lesions seen  Duodenum/jejunum:Two non bleeding AVMs noted in descending duodenum. -GI reconsulted. Plans for colonoscopy 2/1     Hypertension, uncontrolled  -continue carvedilol, hydralazine     Hypokalemia  -replace as needed    Dyslipidemia  -stable    Peripheral artery disease  -status post right above-knee amputation  -Antiplatelet therapy medication is on hold due to acute GI bleed   -Appreciate vascular surgery    Coronary artery disease, status post CABG/Chronic atrial fibrillation  -Continue home meds  -Antiplatelets on hold    S/p PEG tube placement:  The patient has no difficulties in swallowing.   The PEG tube was placed so that the patient can receive supplemental feeding.  -appreciate nutrition, continue night feeds     Mechanical soft diet    PT/OT    Code status: FULL CODE  DVT prophylaxis: No anticoagulation sec to GI bleed  Spoke with patient's daughter Nirali Domínguez (medical POA) [phone]    Care Plan discussed with: Patient/Family  Anticipated Disposition: TBD  Anticipated Discharge: 1-2 days     Hospital Problems  Date Reviewed: 1/21/2021          Codes Class Noted POA    * (Principal) Acute upper GI bleed ICD-10-CM: K92.2  ICD-9-CM: 578.9  1/21/2021 Yes                Review of Systems:   Negative unless stated above       Vital Signs:    Last 24hrs VS reviewed since prior progress note. Most recent are:  Visit Vitals  /86   Pulse (!) 106   Temp 98.5 °F (36.9 °C)   Resp 14   Ht 5' 7\" (1.702 m)   Wt 34.3 kg (75 lb 9.9 oz)   SpO2 99%   BMI 11.84 kg/m²         Intake/Output Summary (Last 24 hours) at 1/31/2021 1009  Last data filed at 1/30/2021 2130  Gross per 24 hour   Intake 650.8 ml   Output 300 ml   Net 350.8 ml        Physical Examination:     I had a face to face encounter with this patient and independently examined them on 1/31/2021 as outlined below:          Constitutional:  No acute distress   ENT:  Oral mucosa moist, oropharynx benign. Resp:  CTA bilaterally. No wheezing/rhonchi/rales. No accessory muscle use   CV:  tachycardic, no murmurs, gallops, rubs    GI:  Soft, non distended, non tender. normoactive bowel sounds, no hepatosplenomegaly, PEG tube    Musculoskeletal:  No edema, warm, 2+ pulses throughout, right and left AKA    Neurologic: AAOx3     Psych:  Good insight, Not anxious nor agitated. Data Review:    Review and/or order of clinical lab test      Labs:     Recent Labs     01/31/21 0439 01/30/21 0353   WBC 10.3 8.9   HGB 9.9* 7.5*   HCT 31.5* 24.8*   * 383     Recent Labs     01/31/21  0439 01/30/21  0353 01/29/21  0514    137 137   K 4.3 4.2 4.2    104 105   CO2 27 30 30   BUN 15 14 14   CREA 0.58* 0.54* 0.58*   * 127* 126*   CA 9.0 8.9 9.0   MG 2.2 1.9 1.8   PHOS 2.5* 2.2* 2.4*     No results for input(s): ALT, AP, TBIL, TBILI, TP, ALB, GLOB, GGT, AML, LPSE in the last 72 hours.     No lab exists for component: SGOT, GPT, AMYP, HLPSE  No results for input(s): INR, PTP, APTT, INREXT, INREXT in the last 72 hours. No results for input(s): FE, TIBC, PSAT, FERR in the last 72 hours. Lab Results   Component Value Date/Time    Folate 10.9 01/21/2021 09:02 AM      No results for input(s): PH, PCO2, PO2 in the last 72 hours. No results for input(s): CPK, CKNDX, TROIQ in the last 72 hours.     No lab exists for component: CPKMB  No results found for: CHOL, CHOLX, CHLST, CHOLV, HDL, HDLP, LDL, LDLC, DLDLP, TGLX, TRIGL, TRIGP, CHHD, CHHDX  Lab Results   Component Value Date/Time    Glucose (POC) 121 (H) 12/07/2020 04:18 PM     Lab Results   Component Value Date/Time    Color Yellow/Straw 12/04/2020 08:27 PM    Appearance Clear 12/04/2020 08:27 PM    Specific gravity 1.015 12/04/2020 08:27 PM    pH (UA) 7.0 12/04/2020 08:27 PM    Protein 30 (A) 12/04/2020 08:27 PM    Glucose Negative 12/04/2020 08:27 PM    Ketone Negative 12/04/2020 08:27 PM    Bilirubin Negative 12/04/2020 08:27 PM    Urobilinogen 0.2 12/04/2020 08:27 PM    Nitrites Negative 12/04/2020 08:27 PM    Leukocyte Esterase Negative 12/04/2020 08:27 PM    Epithelial cells Few 12/04/2020 08:27 PM    Bacteria Negative (A) 12/04/2020 08:27 PM    WBC 0-4 12/04/2020 08:27 PM    RBC 0-5 12/04/2020 08:27 PM         Medications Reviewed:     Current Facility-Administered Medications   Medication Dose Route Frequency    0.9% sodium chloride infusion 250 mL  250 mL IntraVENous PRN    influenza vaccine 2020-21 (6 mos+)(PF) (FLUARIX/FLULAVAL/FLUZONE QUAD) injection 0.5 mL  0.5 mL IntraMUSCular PRIOR TO DISCHARGE    thiamine HCL (B-1) tablet 100 mg  100 mg Oral DAILY    multivitamin, tx-iron-ca-min (THERA-M w/ IRON) tablet 1 Tab  1 Tab Oral DAILY    HYDROmorphone (PF) (DILAUDID) injection 0.5 mg  0.5 mg IntraVENous Q4H PRN    hydrALAZINE (APRESOLINE) 20 mg/mL injection 10 mg  10 mg IntraVENous Q6H PRN    lisinopriL (PRINIVIL, ZESTRIL) tablet 10 mg  10 mg Oral DAILY    atorvastatin (LIPITOR) tablet 40 mg  40 mg Oral DAILY    carvediloL (COREG) tablet 6.25 mg  6.25 mg Oral BID WITH MEALS    ferrous sulfate tablet 325 mg  325 mg Oral ACB    hydrALAZINE (APRESOLINE) tablet 25 mg  25 mg Oral TID    loratadine (CLARITIN) tablet 10 mg  10 mg Oral DAILY    oxyCODONE-acetaminophen (PERCOCET) 5-325 mg per tablet 1 Tab  1 Tab Oral Q4H PRN    polyethylene glycol (MIRALAX) packet 17 g  17 g Oral DAILY    senna (SENOKOT) tablet 8.6 mg  1 Tab Oral DAILY    sodium chloride (NS) flush 5-40 mL  5-40 mL IntraVENous Q8H    sodium chloride (NS) flush 5-40 mL  5-40 mL IntraVENous PRN    acetaminophen (TYLENOL) tablet 650 mg  650 mg Oral Q6H PRN    Or    acetaminophen (TYLENOL) suppository 650 mg  650 mg Rectal Q6H PRN    promethazine (PHENERGAN) tablet 12.5 mg  12.5 mg Oral Q6H PRN    Or    ondansetron (ZOFRAN) injection 4 mg  4 mg IntraVENous Q6H PRN    lactobac ac& pc-s.therm-b.anim (MELODY Q/RISAQUAD)  1 Cap Oral DAILY    pantoprazole (PROTONIX) 40 mg in 0.9% sodium chloride 10 mL injection  40 mg IntraVENous Q12H     ______________________________________________________________________  EXPECTED LENGTH OF STAY: 4d 9h  ACTUAL LENGTH OF STAY:          56 [address] [facility], DO

## 2021-01-31 NOTE — PROGRESS NOTES
Received handoff from weekend CM. Discussed with Dr. Juan Manuel Hankins, [facility] on will hold, will not be discharged today. Placed call back to [facility] and placed on WILL CALL. Placed call to [employer] [phone], spoke with Terese.     Rich Javier, RN, BSN, [facility]  ED Care Management  [phone]

## 2021-02-01 ENCOUNTER — ANESTHESIA EVENT (OUTPATIENT)
Dept: ENDOSCOPY | Age: 66
DRG: 474 | End: 2021-02-01
Payer: MEDICARE

## 2021-02-01 ENCOUNTER — ANESTHESIA (OUTPATIENT)
Dept: ENDOSCOPY | Age: 66
DRG: 474 | End: 2021-02-01
Payer: MEDICARE

## 2021-02-01 LAB
ANION GAP SERPL CALC-SCNC: 5 MMOL/L (ref 5–15)
ATRIAL RATE: 108 BPM
BUN SERPL-MCNC: 11 MG/DL (ref 6–20)
BUN/CREAT SERPL: 19 (ref 12–20)
CALCIUM SERPL-MCNC: 9.7 MG/DL (ref 8.5–10.1)
CALCULATED P AXIS, ECG09: 80 DEGREES
CALCULATED R AXIS, ECG10: 75 DEGREES
CALCULATED T AXIS, ECG11: 106 DEGREES
CHLORIDE SERPL-SCNC: 104 MMOL/L (ref 97–108)
CO2 SERPL-SCNC: 27 MMOL/L (ref 21–32)
COMMENT, HOLDF: NORMAL
CREAT SERPL-MCNC: 0.58 MG/DL (ref 0.7–1.3)
DIAGNOSIS, 93000: NORMAL
ERYTHROCYTE [DISTWIDTH] IN BLOOD BY AUTOMATED COUNT: 17 % (ref 11.5–14.5)
GLUCOSE SERPL-MCNC: 115 MG/DL (ref 65–100)
HCT VFR BLD AUTO: 32.9 % (ref 36.6–50.3)
HGB BLD-MCNC: 10.4 G/DL (ref 12.1–17)
MAGNESIUM SERPL-MCNC: 2.1 MG/DL (ref 1.6–2.4)
MCH RBC QN AUTO: 29.3 PG (ref 26–34)
MCHC RBC AUTO-ENTMCNC: 31.6 G/DL (ref 30–36.5)
MCV RBC AUTO: 92.7 FL (ref 80–99)
NRBC # BLD: 0 K/UL (ref 0–0.01)
NRBC BLD-RTO: 0 PER 100 WBC
P-R INTERVAL, ECG05: 126 MS
PHOSPHATE SERPL-MCNC: 3.9 MG/DL (ref 2.6–4.7)
PLATELET # BLD AUTO: 431 K/UL (ref 150–400)
PMV BLD AUTO: 9.1 FL (ref 8.9–12.9)
POTASSIUM SERPL-SCNC: 4.2 MMOL/L (ref 3.5–5.1)
Q-T INTERVAL, ECG07: 328 MS
QRS DURATION, ECG06: 74 MS
QTC CALCULATION (BEZET), ECG08: 439 MS
RBC # BLD AUTO: 3.55 M/UL (ref 4.1–5.7)
SAMPLES BEING HELD,HOLD: NORMAL
SODIUM SERPL-SCNC: 136 MMOL/L (ref 136–145)
VENTRICULAR RATE, ECG03: 108 BPM
WBC # BLD AUTO: 10.7 K/UL (ref 4.1–11.1)

## 2021-02-01 PROCEDURE — 94760 N-INVAS EAR/PLS OXIMETRY 1: CPT

## 2021-02-01 PROCEDURE — 76040000007: Performed by: INTERNAL MEDICINE

## 2021-02-01 PROCEDURE — 77030013992 HC SNR POLYP ENDOSC BSC -B: Performed by: INTERNAL MEDICINE

## 2021-02-01 PROCEDURE — 36415 COLL VENOUS BLD VENIPUNCTURE: CPT

## 2021-02-01 PROCEDURE — C9113 INJ PANTOPRAZOLE SODIUM, VIA: HCPCS | Performed by: INTERNAL MEDICINE

## 2021-02-01 PROCEDURE — 74011250636 HC RX REV CODE- 250/636: Performed by: INTERNAL MEDICINE

## 2021-02-01 PROCEDURE — 85027 COMPLETE CBC AUTOMATED: CPT

## 2021-02-01 PROCEDURE — 74011250636 HC RX REV CODE- 250/636: Performed by: NURSE ANESTHETIST, CERTIFIED REGISTERED

## 2021-02-01 PROCEDURE — 88305 TISSUE EXAM BY PATHOLOGIST: CPT

## 2021-02-01 PROCEDURE — 0DBK8ZX EXCISION OF ASCENDING COLON, VIA NATURAL OR ARTIFICIAL OPENING ENDOSCOPIC, DIAGNOSTIC: ICD-10-PCS | Performed by: INTERNAL MEDICINE

## 2021-02-01 PROCEDURE — 2709999900 HC NON-CHARGEABLE SUPPLY: Performed by: INTERNAL MEDICINE

## 2021-02-01 PROCEDURE — 76060000032 HC ANESTHESIA 0.5 TO 1 HR: Performed by: INTERNAL MEDICINE

## 2021-02-01 PROCEDURE — 74011000250 HC RX REV CODE- 250: Performed by: INTERNAL MEDICINE

## 2021-02-01 PROCEDURE — 74011250637 HC RX REV CODE- 250/637: Performed by: INTERNAL MEDICINE

## 2021-02-01 PROCEDURE — 77030010936 HC CLP LIG BSC -C: Performed by: INTERNAL MEDICINE

## 2021-02-01 PROCEDURE — 74011250637 HC RX REV CODE- 250/637: Performed by: HOSPITALIST

## 2021-02-01 PROCEDURE — 80048 BASIC METABOLIC PNL TOTAL CA: CPT

## 2021-02-01 PROCEDURE — 74011000250 HC RX REV CODE- 250: Performed by: NURSE ANESTHETIST, CERTIFIED REGISTERED

## 2021-02-01 PROCEDURE — 84100 ASSAY OF PHOSPHORUS: CPT

## 2021-02-01 PROCEDURE — 65660000000 HC RM CCU STEPDOWN

## 2021-02-01 PROCEDURE — 83735 ASSAY OF MAGNESIUM: CPT

## 2021-02-01 DEVICE — WORKING LENGTH 235CM, WORKING CHANNEL 2.8MM
Type: IMPLANTABLE DEVICE | Site: ASCENDING COLON | Status: FUNCTIONAL
Brand: RESOLUTION 360 CLIP

## 2021-02-01 RX ORDER — LIDOCAINE HYDROCHLORIDE 20 MG/ML
INJECTION, SOLUTION INFILTRATION; PERINEURAL AS NEEDED
Status: DISCONTINUED | OUTPATIENT
Start: 2021-02-01 | End: 2021-02-01 | Stop reason: HOSPADM

## 2021-02-01 RX ORDER — SODIUM CHLORIDE 9 MG/ML
INJECTION, SOLUTION INTRAVENOUS
Status: DISCONTINUED | OUTPATIENT
Start: 2021-02-01 | End: 2021-02-01 | Stop reason: HOSPADM

## 2021-02-01 RX ORDER — PROPOFOL 10 MG/ML
INJECTION, EMULSION INTRAVENOUS AS NEEDED
Status: DISCONTINUED | OUTPATIENT
Start: 2021-02-01 | End: 2021-02-01 | Stop reason: HOSPADM

## 2021-02-01 RX ADMIN — POLYETHYLENE GLYCOL 3350 17 G: 17 POWDER, FOR SOLUTION ORAL at 11:14

## 2021-02-01 RX ADMIN — HYDRALAZINE HYDROCHLORIDE 25 MG: 50 TABLET, FILM COATED ORAL at 21:30

## 2021-02-01 RX ADMIN — SODIUM CHLORIDE 40 MG: 9 INJECTION INTRAMUSCULAR; INTRAVENOUS; SUBCUTANEOUS at 11:14

## 2021-02-01 RX ADMIN — Medication 8.6 MG: at 11:14

## 2021-02-01 RX ADMIN — Medication 1 CAPSULE: at 11:14

## 2021-02-01 RX ADMIN — PROPOFOL 30 MG: 10 INJECTION, EMULSION INTRAVENOUS at 09:33

## 2021-02-01 RX ADMIN — SODIUM CHLORIDE 40 MG: 9 INJECTION INTRAMUSCULAR; INTRAVENOUS; SUBCUTANEOUS at 21:30

## 2021-02-01 RX ADMIN — PROPOFOL 30 MG: 10 INJECTION, EMULSION INTRAVENOUS at 09:44

## 2021-02-01 RX ADMIN — LIDOCAINE HYDROCHLORIDE 40 MG: 20 INJECTION, SOLUTION INFILTRATION; PERINEURAL at 09:30

## 2021-02-01 RX ADMIN — Medication 10 ML: at 06:34

## 2021-02-01 RX ADMIN — HYDRALAZINE HYDROCHLORIDE 25 MG: 50 TABLET, FILM COATED ORAL at 18:00

## 2021-02-01 RX ADMIN — SODIUM CHLORIDE: 900 INJECTION, SOLUTION INTRAVENOUS at 09:21

## 2021-02-01 RX ADMIN — FERROUS SULFATE TAB 325 MG (65 MG ELEMENTAL FE) 325 MG: 325 (65 FE) TAB at 06:34

## 2021-02-01 RX ADMIN — HYDRALAZINE HYDROCHLORIDE 25 MG: 50 TABLET, FILM COATED ORAL at 11:14

## 2021-02-01 RX ADMIN — CARVEDILOL 6.25 MG: 6.25 TABLET, FILM COATED ORAL at 11:20

## 2021-02-01 RX ADMIN — CARVEDILOL 6.25 MG: 6.25 TABLET, FILM COATED ORAL at 18:00

## 2021-02-01 RX ADMIN — MULTIPLE VITAMINS W/ MINERALS TAB 1 TABLET: TAB at 11:14

## 2021-02-01 RX ADMIN — PROPOFOL 30 MG: 10 INJECTION, EMULSION INTRAVENOUS at 09:37

## 2021-02-01 RX ADMIN — ATORVASTATIN CALCIUM 40 MG: 40 TABLET, FILM COATED ORAL at 11:14

## 2021-02-01 RX ADMIN — LISINOPRIL 10 MG: 10 TABLET ORAL at 11:14

## 2021-02-01 RX ADMIN — PROPOFOL 30 MG: 10 INJECTION, EMULSION INTRAVENOUS at 09:50

## 2021-02-01 RX ADMIN — Medication 10 ML: at 18:00

## 2021-02-01 RX ADMIN — Medication 10 ML: at 21:39

## 2021-02-01 RX ADMIN — SODIUM CHLORIDE 500 ML: 9 INJECTION, SOLUTION INTRAVENOUS at 16:00

## 2021-02-01 RX ADMIN — Medication 100 MG: at 11:14

## 2021-02-01 RX ADMIN — PROPOFOL 50 MG: 10 INJECTION, EMULSION INTRAVENOUS at 09:30

## 2021-02-01 RX ADMIN — PROPOFOL 30 MG: 10 INJECTION, EMULSION INTRAVENOUS at 09:40

## 2021-02-01 RX ADMIN — LORATADINE 10 MG: 10 TABLET ORAL at 11:15

## 2021-02-01 NOTE — PROGRESS NOTES
Physical Therapy  2/1/2021    Chart reviewed. Patient off the floor in ENDO at this time. F/u later as able for PT session. Thank you.     Kendal Dorantes, PT, DPT

## 2021-02-01 NOTE — PERIOP NOTES
TRANSFER - OUT REPORT:    Verbal report given to SOFI Colon (name) on Eddie Wilhelm  being transferred to [facility](unit) for routine progression of care       Information from the following report(s) Procedure Summary and Recent Results was reviewed with the receiving nurse. Lines:   Peripheral IV 01/28/21 Left Wrist (Active)   Site Assessment Clean, dry, & intact 02/01/21 0430   Phlebitis Assessment 0 02/01/21 0430   Infiltration Assessment 0 02/01/21 0430   Dressing Status Clean, dry, & intact 02/01/21 0430   Dressing Type Transparent 02/01/21 0430   Hub Color/Line Status Pink;Capped 02/01/21 0430   Action Taken Open ports on tubing capped 02/01/21 0430   Alcohol Cap Used Yes 02/01/21 0430        Opportunity for questions and clarification was provided.

## 2021-02-01 NOTE — PROGRESS NOTES
2/1/2021 -   RYDER:  - RUR: 25%  - Disposition is for discharge to [facility]  - Patient will need BLS transport; requested 11:00 for 2/2      - Patient received 1 unit PRBC over the weekend  - Patient to have a colonoscopy today, 2/1    09:00 -   CM notes that patient's weekend discharge was held due to GIB concerns. CM contacted [facility] ([address]: [phone]) to provide clinical update. Per Michelle, patient's 1/29 COVID test is still valid. Discharge summary will need to be faxed to confirmed fax F: [phone]  CRM: Lilly Silva, MPH, [facility]; Z: [phone]    14:30 -  CM rounded on patient with attending. Patient to be discharged tomorrow, 2/2. CM requested 11:00 transport with [facility] for 2/2. [facility]) aware of the above. Patient's daughter Beronica Mcghee: [phone]) is aware of the above. CM to touch base with [facility] in the morning. Medicare pt has received, reviewed, and signed 2nd IM letter informing them of their right to appeal the discharge. Signed copy has been placed on pt bedside chart.    CRM: Lilly Silva, MPH, [address] [place] [address]; Z: [phone]

## 2021-02-01 NOTE — ANESTHESIA POSTPROCEDURE EVALUATION
Post-Anesthesia Evaluation and Assessment    Patient: Brent Medrano MRN: [id number]  SSN: [id number]    YOB: 1955  Age: 77 y.o. Sex: male       Cardiovascular Function/Vital Signs  Visit Vitals  BP (!) 157/88 (BP 1 Location: Right upper arm, BP Patient Position: Supine)   Pulse (!) 115   Temp 36.9 °C (98.4 °F)   Resp 22   Ht 5' 7\" (1.702 m)   Wt 31.2 kg (68 lb 12.8 oz)   SpO2 98%   BMI 10.78 kg/m²       Patient is status post MAC anesthesia for Procedure(s):  COLONOSCOPY  ENDOSCOPIC POLYPECTOMY  RESOLUTION CLIP. Nausea/Vomiting: None    Postoperative hydration reviewed and adequate. Pain:  Pain Scale 1: Visual (02/01/21 1020)  Pain Intensity 1: 0 (02/01/21 1020)   Managed    Neurological Status:   Neuro (WDL): Exceptions to WDL (01/27/21 1545)  Neuro  Neurologic State: Alert (01/31/21 2011)  Orientation Level: Oriented X4 (01/31/21 2011)  Cognition: Appropriate decision making; Appropriate for age attention/concentration; Appropriate safety awareness; Follows commands (01/31/21 2011)  Speech: Clear (01/31/21 2011)  Assessment L Pupil: Brisk (01/31/21 0800)  Size L Pupil (mm): 2 (01/31/21 0800)  Assessment R Pupil: Brisk (01/31/21 0800)  Size R Pupil (mm): 2 (01/31/21 0800)  LUE Motor Response: Purposeful (01/31/21 2011)  LLE Motor Response: Weak (01/31/21 0800)  RUE Motor Response: Purposeful (01/31/21 2011)  RLE Motor Response: Weak (01/31/21 0800)   At baseline    Mental Status and Level of Consciousness: Alert and oriented to person, place, and time    Pulmonary Status:   O2 Device: Room air (02/01/21 1020)   Adequate oxygenation and airway patent    Complications related to anesthesia: None    Post-anesthesia assessment completed. No concerns    Signed By: Raegan Buchanan MD     February 1, 2021              Procedure(s):  COLONOSCOPY  ENDOSCOPIC POLYPECTOMY  RESOLUTION CLIP.     MAC    <BSHSIANPOST>    INITIAL Post-op Vital signs:   Vitals Value Taken Time   BP 73/62 02/01/21 1040   Temp 36.9 °C (98.4 °F) 02/01/21 1038   Pulse 69 02/01/21 1041   Resp 14 02/01/21 1041   SpO2 94 % 02/01/21 1041   Vitals shown include unvalidated device data.

## 2021-02-01 NOTE — ANESTHESIA PREPROCEDURE EVALUATION
Relevant Problems   RESPIRATORY SYSTEM   (+) COPD (chronic obstructive pulmonary disease) (HCC)      NEUROLOGY   (+) Depression, major      CARDIOVASCULAR   (+) CAD (coronary artery disease)   (+) CHF (congestive heart failure) (HCC)   (+) Chronic atrial fibrillation (HCC)   (+) HTN (hypertension), benign   (+) Hx of CABG      RENAL FAILURE   (+) CKD (chronic kidney disease)      HEMATOLOGY   (+) Anemia       Anesthetic History   No history of anesthetic complications            Review of Systems / Medical History  Patient summary reviewed, nursing notes reviewed and pertinent labs reviewed    Pulmonary  Within defined limits  COPD               Neuro/Psych   Within defined limits      Psychiatric history     Cardiovascular  Within defined limits  Hypertension      CHF  Dysrhythmias : atrial fibrillation  Past MI, CAD, PAD and CABG    Exercise tolerance: <4 METS     GI/Hepatic/Renal  Within defined limits       Renal disease       Endo/Other  Within defined limits      Anemia     Other Findings              Physical Exam    Airway  Mallampati: II  TM Distance: > 6 cm  Neck ROM: normal range of motion   Mouth opening: Normal     Cardiovascular  Regular rate and rhythm,  S1 and S2 normal,  no murmur, click, rub, or gallop             Dental    Dentition: Edentulous     Pulmonary  Breath sounds clear to auscultation               Abdominal  GI exam deferred       Other Findings            Anesthetic Plan    ASA: 3  Anesthesia type: MAC          Induction: Intravenous  Anesthetic plan and risks discussed with: Patient

## 2021-02-01 NOTE — PERIOP NOTES
TRANSFER - IN REPORT:    Verbal report received from [facility] Radha, RN (name) on Hussein Whitaker  being received from [phone] (unit) for ordered procedure      Information from the following report(s) SBAR, ED Summary, MAR, Recent Results, Cardiac Rhythm -NSR/ST, Pre Procedure Checklist, Procedure Verification and Quality Measures was reviewed with the receiving nurse. Opportunity for questions and clarification was provided. Assessment completed upon patients arrival to unit and care assumed.

## 2021-02-01 NOTE — PROGRESS NOTES
Bedside shift change report given to SOFI Colon (oncoming nurse) by Lucy Rodriguez RN (offgoing nurse). Report included the following information SBAR, Kardex, Intake/Output, MAR, Accordion, Recent Results and Cardiac Rhythm Sinus Tach.

## 2021-02-01 NOTE — PROGRESS NOTES
[address] [facility] Adult  Hospitalist Group                                                                                          Hospitalist Progress Note  [address] [facility],   Answering service: [phone] from in house phone        Date of Service:  2021  NAME:  Vladimir Lee  :  1955  MRN:  [id number]      Admission Summary: This is a 75-year-old man with past medical history significant for hypertension; dyslipidemia; peripheral artery disease, status post right above-knee amputation; coronary artery disease, status post CABG; chronic atrial fibrillation; status post CVA; and status post PEG tube placement, who was in his usual state of health until the day of his presentation at the emergency room when it was reported that the patient's hemoglobin was low at the nursing home where the patient presently resides. Hemoglobin was checked at the facility because of his chronic left foot ulcer. The patient was found to have hemoglobin of 6.2. He was sent to [facility] Emergency Room for further evaluation. According to report, the patient used to have wound VAC in the left foot. The wound VAC was removed because of excessive bleeding. When the patient arrived at the emergency room at [facility], the low hemoglobin was confirmed. Blood transfusion was started. The patient was transferred to [facility] for further evaluation. Not only was the low hemoglobin confirmed at the emergency room at [facility], the patient reported that he was passing dark stool and the stool occult blood was positive, and no GI service [facility].  Blood transfusion was started when the patient arrived at [facility] emergency room. The patient has completed 1 unit of packed red blood cells. He was referred to the hospitalist service for evaluation for admission.   He was recently admitted to [facility].  The patient was admitted and treated for sepsis coming from the left leg wound. Following the treatment, he was discharged to  where the patient presently resides. The patient is not a good historian but denies chest pain, fever, rigors, chills.       Interval history / Subjective: Follow up osteomyelitis. Patient seen and examined. Colonoscopy today and no evidence of bleeding. Suspected source is AVMs. Tachycardic post procedure. Assessment & Plan:     Infected left foot ulcer stage III/osteomyelitis  -was admitted to [facility] for this and the patient is presently receiving intravenous antibiotics through the PICC line  -Wound culture Pseudomonas/Diphtheroids  -MRI foot 1/23 suggestive of osteomyelitis  -s/p Vanc/cefepime/Doxy, had surgical cure with AKA  -Appreciate ID/Podiatry/Vascular  -s/p Left AKA 1/27    Tachycardia: improved   -multifactorial, volume depletion vs ongoing GI bleed   -fluid bolus 2/1    GI bleed: suspected secondary to AVMs  Acute blood loss anemia  -continue PPI  -Appreciate GI, diagnostic EGD only 1/21 since the patient is on Plavix:  Esophagus:normal  Stomach:PEG tube seen in body, no other lesions seen  Duodenum/jejunum:Two non bleeding AVMs noted in descending duodenum. -GI reconsulted    -Colonoscopy 2/1  Rectum: small internal   Sigmoid: moderate diverticulosis  Descending Colon: moderate diverticulosis  Transverse Colon: normal  Ascending Colon: One 10 mm sessile polyp, removed with hot snare. Polypectomy site empirically closed with one clip.  Two sessile polyps (5-8 mm), removed with cold snare  Cecum: normal  Terminal Ileum: normal    Hypertension:  -continue carvedilol, hydralazine     Hypokalemia  -replace as needed    Dyslipidemia  -stable    Peripheral artery disease  -status post right above-knee amputation  -Antiplatelet therapy medication is on hold due to acute GI bleed   -Appreciate vascular surgery    Coronary artery disease, status post CABG/Chronic atrial fibrillation  -Continue home meds  -Antiplatelets on hold    S/p PEG tube placement:  The patient has no difficulties in swallowing. The PEG tube was placed so that the patient can receive supplemental feeding.  -appreciate nutrition, continue night feeds     Mechanical soft diet    PT/OT    Code status: FULL CODE  DVT prophylaxis: No anticoagulation sec to GI bleed    Care Plan discussed with: Patient/Family  Anticipated Disposition: SNF  Anticipated Discharge: <24 hours     Hospital Problems  Date Reviewed: 2/1/2021          Codes Class Noted POA    * (Principal) Acute upper GI bleed ICD-10-CM: K92.2  ICD-9-CM: 578.9  1/21/2021 Yes                Review of Systems:   Negative unless stated above       Vital Signs:    Last 24hrs VS reviewed since prior progress note. Most recent are:  Visit Vitals  BP (!) 157/88 (BP 1 Location: Right upper arm, BP Patient Position: Supine)   Pulse (!) 132   Temp 98.4 °F (36.9 °C)   Resp 22   Ht 5' 7\" (1.702 m)   Wt 31.2 kg (68 lb 12.8 oz)   SpO2 98%   BMI 10.78 kg/m²         Intake/Output Summary (Last 24 hours) at 2/1/2021 1514  Last data filed at 2/1/2021 1609  Gross per 24 hour   Intake 2330 ml   Output 300 ml   Net 2030 ml        Physical Examination:     I had a face to face encounter with this patient and independently examined them on 2/1/2021 as outlined below:          Constitutional:  No acute distress   ENT:  Oral mucosa moist, oropharynx benign. Resp:  CTA bilaterally. No wheezing/rhonchi/rales. No accessory muscle use   CV:  tachycardic, no murmurs, gallops, rubs    GI:  Soft, non distended, non tender. normoactive bowel sounds, no hepatosplenomegaly, PEG tube    Musculoskeletal:  No edema, warm, 2+ pulses throughout, right and left AKA    Neurologic: AAOx3     Psych:  Good insight, Not anxious nor agitated.        Data Review:    Review and/or order of clinical lab test      Labs:     Recent Labs     02/01/21  0226 01/31/21  0439   WBC 10.7 10.3   HGB 10.4* 9.9*   HCT 32.9* 31.5*   * 403*     Recent Labs     02/01/21  0226 01/31/21  0439 01/30/21  0353    138 137   K 4.2 4.3 4.2    105 104   CO2 27 27 30   BUN 11 15 14   CREA 0.58* 0.58* 0.54*   * 166* 127*   CA 9.7 9.0 8.9   MG 2.1 2.2 1.9   PHOS 3.9 2.5* 2.2*     No results for input(s): ALT, AP, TBIL, TBILI, TP, ALB, GLOB, GGT, AML, LPSE in the last 72 hours. No lab exists for component: SGOT, GPT, AMYP, HLPSE  No results for input(s): INR, PTP, APTT, INREXT, INREXT in the last 72 hours. No results for input(s): FE, TIBC, PSAT, FERR in the last 72 hours. Lab Results   Component Value Date/Time    Folate 10.9 01/21/2021 09:02 AM      No results for input(s): PH, PCO2, PO2 in the last 72 hours. No results for input(s): CPK, CKNDX, TROIQ in the last 72 hours.     No lab exists for component: CPKMB  No results found for: CHOL, CHOLX, CHLST, CHOLV, HDL, HDLP, LDL, LDLC, DLDLP, TGLX, TRIGL, TRIGP, CHHD, CHHDX  Lab Results   Component Value Date/Time    Glucose (POC) 121 (H) 12/07/2020 04:18 PM     Lab Results   Component Value Date/Time    Color Yellow/Straw 12/04/2020 08:27 PM    Appearance Clear 12/04/2020 08:27 PM    Specific gravity 1.015 12/04/2020 08:27 PM    pH (UA) 7.0 12/04/2020 08:27 PM    Protein 30 (A) 12/04/2020 08:27 PM    Glucose Negative 12/04/2020 08:27 PM    Ketone Negative 12/04/2020 08:27 PM    Bilirubin Negative 12/04/2020 08:27 PM    Urobilinogen 0.2 12/04/2020 08:27 PM    Nitrites Negative 12/04/2020 08:27 PM    Leukocyte Esterase Negative 12/04/2020 08:27 PM    Epithelial cells Few 12/04/2020 08:27 PM    Bacteria Negative (A) 12/04/2020 08:27 PM    WBC 0-4 12/04/2020 08:27 PM    RBC 0-5 12/04/2020 08:27 PM         Medications Reviewed:     Current Facility-Administered Medications   Medication Dose Route Frequency    sodium chloride 0.9 % bolus infusion 500 mL  500 mL IntraVENous ONCE    0.9% sodium chloride infusion 250 mL  250 mL IntraVENous PRN    influenza vaccine 2020-21 (6 mos+)(PF) (FLUARIX/FLULAVAL/FLUZONE QUAD) injection 0.5 mL  0.5 mL IntraMUSCular PRIOR TO DISCHARGE    thiamine HCL (B-1) tablet 100 mg  100 mg Oral DAILY    multivitamin, tx-iron-ca-min (THERA-M w/ IRON) tablet 1 Tab  1 Tab Oral DAILY    HYDROmorphone (PF) (DILAUDID) injection 0.5 mg  0.5 mg IntraVENous Q4H PRN    hydrALAZINE (APRESOLINE) 20 mg/mL injection 10 mg  10 mg IntraVENous Q6H PRN    lisinopriL (PRINIVIL, ZESTRIL) tablet 10 mg  10 mg Oral DAILY    atorvastatin (LIPITOR) tablet 40 mg  40 mg Oral DAILY    carvediloL (COREG) tablet 6.25 mg  6.25 mg Oral BID WITH MEALS    ferrous sulfate tablet 325 mg  325 mg Oral ACB    hydrALAZINE (APRESOLINE) tablet 25 mg  25 mg Oral TID    loratadine (CLARITIN) tablet 10 mg  10 mg Oral DAILY    oxyCODONE-acetaminophen (PERCOCET) 5-325 mg per tablet 1 Tab  1 Tab Oral Q4H PRN    polyethylene glycol (MIRALAX) packet 17 g  17 g Oral DAILY    senna (SENOKOT) tablet 8.6 mg  1 Tab Oral DAILY    sodium chloride (NS) flush 5-40 mL  5-40 mL IntraVENous Q8H    sodium chloride (NS) flush 5-40 mL  5-40 mL IntraVENous PRN    acetaminophen (TYLENOL) tablet 650 mg  650 mg Oral Q6H PRN    Or    acetaminophen (TYLENOL) suppository 650 mg  650 mg Rectal Q6H PRN    promethazine (PHENERGAN) tablet 12.5 mg  12.5 mg Oral Q6H PRN    Or    ondansetron (ZOFRAN) injection 4 mg  4 mg IntraVENous Q6H PRN    lactobac ac& pc-s.therm-b.anim (MELODY Q/RISAQUAD)  1 Cap Oral DAILY    pantoprazole (PROTONIX) 40 mg in 0.9% sodium chloride 10 mL injection  40 mg IntraVENous Q12H     ______________________________________________________________________  EXPECTED LENGTH OF STAY: 4d 9h  ACTUAL LENGTH OF STAY:          203 CHARLI Johnson, DO

## 2021-02-01 NOTE — PROGRESS NOTES
Chart reviewed. Patient off the floor in ENDO at this time.  Will follow up later as able for OT session  LAMINE Haley/SERA

## 2021-02-01 NOTE — H&P
[address]  [address] [place], [address]  [phone]                                History and Physical     NAME: Adam Villa   :  1955   MRN:  [id number]     HPI:  The patient was seen and examined.     Past Surgical History:   Procedure Laterality Date    HX ABOVE KNEE AMPUTATION Right     HX ABOVE KNEE AMPUTATION Right     HX HEART CATHETERIZATION       Past Medical History:   Diagnosis Date    Atrial fibrillation ([facility]     Hypertension     MI (myocardial infarction) ([facility]     PVD (peripheral vascular disease) ([facility]      Social History     Tobacco Use    Smoking status: Former Smoker    Smokeless tobacco: Never Used   Substance Use Topics    Alcohol use: Not Currently    Drug use: Never     No Known Allergies  Family History   Problem Relation Age of Onset    Cancer Mother         Ovarian, Cervical    Stroke Father     Cancer Brother      Current Facility-Administered Medications   Medication Dose Route Frequency    0.9% sodium chloride infusion 250 mL  250 mL IntraVENous PRN    influenza vaccine 2020- (6 mos+)(PF) (FLUARIX/FLULAVAL/FLUZONE QUAD) injection 0.5 mL  0.5 mL IntraMUSCular PRIOR TO DISCHARGE    thiamine HCL (B-1) tablet 100 mg  100 mg Oral DAILY    multivitamin, tx-iron-ca-min (THERA-M w/ IRON) tablet 1 Tab  1 Tab Oral DAILY    HYDROmorphone (PF) (DILAUDID) injection 0.5 mg  0.5 mg IntraVENous Q4H PRN    hydrALAZINE (APRESOLINE) 20 mg/mL injection 10 mg  10 mg IntraVENous Q6H PRN    lisinopriL (PRINIVIL, ZESTRIL) tablet 10 mg  10 mg Oral DAILY    atorvastatin (LIPITOR) tablet 40 mg  40 mg Oral DAILY    carvediloL (COREG) tablet 6.25 mg  6.25 mg Oral BID WITH MEALS    ferrous sulfate tablet 325 mg  325 mg Oral ACB    hydrALAZINE (APRESOLINE) tablet 25 mg  25 mg Oral TID    loratadine (CLARITIN) tablet 10 mg  10 mg Oral DAILY    oxyCODONE-acetaminophen (PERCOCET) 5-325 mg per tablet 1 Tab  1 Tab Oral Q4H PRN    polyethylene glycol (MIRALAX) packet 17 g  17 g Oral DAILY    senna (SENOKOT) tablet 8.6 mg  1 Tab Oral DAILY    sodium chloride (NS) flush 5-40 mL  5-40 mL IntraVENous Q8H    sodium chloride (NS) flush 5-40 mL  5-40 mL IntraVENous PRN    acetaminophen (TYLENOL) tablet 650 mg  650 mg Oral Q6H PRN    Or    acetaminophen (TYLENOL) suppository 650 mg  650 mg Rectal Q6H PRN    promethazine (PHENERGAN) tablet 12.5 mg  12.5 mg Oral Q6H PRN    Or    ondansetron (ZOFRAN) injection 4 mg  4 mg IntraVENous Q6H PRN    lactobac ac& pc-s.therm-b.anim (MELODY Q/RISAQUAD)  1 Cap Oral DAILY    pantoprazole (PROTONIX) 40 mg in 0.9% sodium chloride 10 mL injection  40 mg IntraVENous Q12H         PHYSICAL EXAM:  General: WD, WN. Alert, cooperative, no acute distress    HEENT: NC, Atraumatic. PERRLA, EOMI. Anicteric sclerae. Lungs:  CTA Bilaterally. No Wheezing/Rhonchi/Rales. Heart:  Regular  rhythm,  No murmur, No Rubs, No Gallops    The heart, lungs and mental status were satisfactory for the administration of MAC sedation and for the procedure. Mallampati score: 2     The patient was counseled at length about the risks of eunice Covid-19 in the lizy-operative and post-operative states including the recovery window of their procedure. The patient was made aware that eunice Covid-19 after a surgical procedure may worsen their prognosis for recovering from the virus and lend to a higher morbidity and or mortality risk. The patient was given the options of postponing their procedure. All of the risks, benefits, and alternatives were discussed. The patient does wish to proceed with the procedure.       Assessment:   · anemia    Plan:   · Endoscopic procedure :colonoscopy  · MAC sedation

## 2021-02-01 NOTE — PROCEDURES
[address] [facility] [address]  [address] [facility] [address] [place], [address]  [phone]                              Colonoscopy Procedure Note      Indications:  Iron deficiency anemia     :  Treasure Jewell MD    Staff: Endoscopy RN-1: Maycol Pritchett RN  Endoscopy RN-2: Melvin Hankins RN    Referring Provider: Nic Cartwright MD    Sedation:  MAC anesthesia    Procedure Details:  After informed consent was obtained with all risks and benefits of procedure explained and preoperative exam completed, the patient was taken to the endoscopy suite and placed in the left lateral decubitus position. Upon sequential sedation as per above, a digital rectal exam was performed per below. The Olympus videocolonoscope was inserted in the rectum and carefully advanced to the terminal ileum. The quality of preparation was good. [place] Bowel Prep Score :3/3/3. The colonoscope was slowly withdrawn with careful evaluation between folds. Retroflexion in the rectum was performed. Findings:   Rectum: small internal   Sigmoid: moderate diverticulosis  Descending Colon: moderate diverticulosis  Transverse Colon: normal  Ascending Colon: One 10 mm sessile polyp, removed with hot snare. Polypectomy site empirically closed with one clip. Two sessile polyps (5-8 mm), removed with cold snare  Cecum: normal  Terminal Ileum: normal    Interventions:  Polypectomy     Specimen Removed:    ID Type Source Tests Collected by Time Destination   1 : ascending colon polyps Preservative Colon, Ascending  Román Cervantes MD 2/1/2021 1159 Pathology       Complications: None.      EBL:  Minimal     Impression:    See Postoperative diagnosis above    Recommendations:   - Await pathology.  - No obvious source of anemia found on today's exam. Noting non-bleeding angioectasias visualized in the small bowel on recent endoscopy, it is possible he may have other AVMs in small bowel?  -  At this time would recommend restarting anti-coagulation as indicated per primary team (spoke with hospitalist, Dr. Thu Figueredo, and it seems he will be restarted on aspirin). Monitor for any further bleeding.   - Advance diet.      Marva Padilla MD  2/1/2021  10:01 AM

## 2021-02-02 VITALS
WEIGHT: 86.86 LBS | BODY MASS INDEX: 13.63 KG/M2 | OXYGEN SATURATION: 100 % | RESPIRATION RATE: 18 BRPM | SYSTOLIC BLOOD PRESSURE: 130 MMHG | HEART RATE: 105 BPM | DIASTOLIC BLOOD PRESSURE: 77 MMHG | TEMPERATURE: 99.3 F | HEIGHT: 67 IN

## 2021-02-02 LAB
HGB BLD-MCNC: 8.6 G/DL (ref 12.1–17)
HGB BLD-MCNC: 9.4 G/DL (ref 12.1–17)

## 2021-02-02 PROCEDURE — C9113 INJ PANTOPRAZOLE SODIUM, VIA: HCPCS | Performed by: INTERNAL MEDICINE

## 2021-02-02 PROCEDURE — 74011250637 HC RX REV CODE- 250/637: Performed by: INTERNAL MEDICINE

## 2021-02-02 PROCEDURE — 74011000250 HC RX REV CODE- 250: Performed by: INTERNAL MEDICINE

## 2021-02-02 PROCEDURE — 74011250637 HC RX REV CODE- 250/637: Performed by: HOSPITALIST

## 2021-02-02 PROCEDURE — 74011250636 HC RX REV CODE- 250/636: Performed by: INTERNAL MEDICINE

## 2021-02-02 RX ORDER — OXYCODONE AND ACETAMINOPHEN 5; 325 MG/1; MG/1
1 TABLET ORAL
Qty: 12 TAB | Refills: 0 | Status: SHIPPED | OUTPATIENT
Start: 2021-02-02 | End: 2021-02-05

## 2021-02-02 RX ORDER — OMEPRAZOLE 40 MG/1
40 CAPSULE, DELAYED RELEASE ORAL DAILY
Qty: 60 CAP | Refills: 0 | Status: SHIPPED | OUTPATIENT
Start: 2021-02-02

## 2021-02-02 RX ADMIN — Medication 10 ML: at 07:17

## 2021-02-02 RX ADMIN — LISINOPRIL 10 MG: 10 TABLET ORAL at 08:34

## 2021-02-02 RX ADMIN — MULTIPLE VITAMINS W/ MINERALS TAB 1 TABLET: TAB at 08:34

## 2021-02-02 RX ADMIN — Medication 8.6 MG: at 08:33

## 2021-02-02 RX ADMIN — Medication 100 MG: at 08:34

## 2021-02-02 RX ADMIN — Medication 1 CAPSULE: at 08:34

## 2021-02-02 RX ADMIN — OXYCODONE HYDROCHLORIDE AND ACETAMINOPHEN 1 TABLET: 5; 325 TABLET ORAL at 10:52

## 2021-02-02 RX ADMIN — HYDRALAZINE HYDROCHLORIDE 25 MG: 50 TABLET, FILM COATED ORAL at 08:34

## 2021-02-02 RX ADMIN — ATORVASTATIN CALCIUM 40 MG: 40 TABLET, FILM COATED ORAL at 08:34

## 2021-02-02 RX ADMIN — SODIUM CHLORIDE 40 MG: 9 INJECTION INTRAMUSCULAR; INTRAVENOUS; SUBCUTANEOUS at 08:34

## 2021-02-02 RX ADMIN — CARVEDILOL 6.25 MG: 6.25 TABLET, FILM COATED ORAL at 08:34

## 2021-02-02 RX ADMIN — POLYETHYLENE GLYCOL 3350 17 G: 17 POWDER, FOR SOLUTION ORAL at 08:35

## 2021-02-02 NOTE — PROGRESS NOTES
2/2/2021 -    CM verified patient has a qualifying hospital stay for [facility].    CRM: Roel Quevedo, MPH,  [place]; Z: [phone]

## 2021-02-02 NOTE — PROGRESS NOTES
Problem: Falls - Risk of  Goal: *Absence of Falls  Description: Document Sean Ambriz Fall Risk and appropriate interventions in the flowsheet. Outcome: Progressing Towards Goal  Note: Fall Risk Interventions:  Mobility Interventions: Communicate number of staff needed for ambulation/transfer         Medication Interventions: Evaluate medications/consider consulting pharmacy    Elimination Interventions: Call light in reach, Urinal in reach              Problem: Patient Education: Go to Patient Education Activity  Goal: Patient/Family Education  Outcome: Progressing Towards Goal     Problem: Pressure Injury - Risk of  Goal: *Prevention of pressure injury  Description: Document Jhonny Scale and appropriate interventions in the flowsheet.   Outcome: Progressing Towards Goal  Note: Pressure Injury Interventions:  Sensory Interventions: Assess changes in LOC, Keep linens dry and wrinkle-free, Maintain/enhance activity level, Minimize linen layers    Moisture Interventions: Absorbent underpads, Apply protective barrier, creams and emollients, Minimize layers, Moisture barrier    Activity Interventions: Assess need for specialty bed, Increase time out of bed, PT/OT evaluation    Mobility Interventions: HOB 30 degrees or less, Pressure redistribution bed/mattress (bed type)    Nutrition Interventions: Document food/fluid/supplement intake    Friction and Shear Interventions: Apply protective barrier, creams and emollients, HOB 30 degrees or less

## 2021-02-02 NOTE — DISCHARGE INSTRUCTIONS
Discharge Instructions       PATIENT ID: Bhavin Marshall  MRN: [id number]   YOB: 1955    DATE OF ADMISSION: 1/21/2021  3:33 AM    DATE OF DISCHARGE: 2/2/2021    PRIMARY CARE PROVIDER: Grant Brock MD     ATTENDING PHYSICIAN: Jonathan Mcnamara DO  DISCHARGING PROVIDER: Marisa Salguero DO    To contact this individual call [phone] and ask the  to page. If unavailable ask to be transferred the Adult Hospitalist Department. DISCHARGE DIAGNOSES     Osteomyelitis   GI bleed       CONSULTATIONS: IP CONSULT TO GASTROENTEROLOGY  IP CONSULT TO PODIATRY  IP CONSULT TO INFECTIOUS DISEASES  IP CONSULT TO GASTROENTEROLOGY  IP CONSULT TO VASCULAR SURGERY    PROCEDURES/SURGERIES: Procedure(s):  COLONOSCOPY  ENDOSCOPIC POLYPECTOMY  RESOLUTION CLIP    PENDING TEST RESULTS:   At the time of discharge the following test results are still pending: colonoscopy biopsy results    FOLLOW UP APPOINTMENTS:   Follow-up Information     Follow up With Specialties Details Why Eligio Mckenzie MD Internal Medicine   [address] MD [facility]  Please schedule follow up for wound check [address] [place] [address]  [phone]             ADDITIONAL CARE RECOMMENDATIONS:     Please have staples removed in 3-4 weeks with Dr. Norris Hernández     Please stop plavix and aspirin for now. Discuss restarting with Dr. Norris Hernández. Please follow new medication list.     Please follow up with primary care provider. Recommend CBC check in 2 weeks. You will need referral to outpatient hematologist for routine transfusions. Patient Education        Learning About Blood Transfusions in Children  What are blood transfusions?      Blood transfusion is a medical treatment to replace the blood or parts of blood that your child's body has lost. The blood goes through a tube from a bag to an intravenous (IV) catheter and into your child's vein. Your child may need a blood transfusion after losing blood from an injury, a major surgery, an illness that causes bleeding, or an illness that destroys blood cells. Transfusions are also used to give your child the parts of blood--such as platelets, plasma, or substances that cause clotting--that your child's body needs to fight an illness. How is a blood transfusion done? Before your child receives a blood transfusion, his or her blood is tested to find out what type it is. Blood or blood parts that are a match with your child's blood type are ordered by the doctor. Blood is typed as A, B, AB, or O. It is also typed as Rh-positive or Rh-negative. Your child's blood is also screened to look for antibodies that might react with the blood that is given to him or her. The blood your child gets is checked and rechecked to make sure that it's the right type. A sample of your child's blood is mixed with a sample of the blood he or she will receive. This is done to check for problems. Before giving the transfusion, a doctor or nurse will look at the label on the package of blood and compare it to your child's hospital ID bracelet and medical records. The transfusion begins only when all agree that this is the correct blood and that your child is the correct person to receive it. To receive the transfusion, your child will have an intravenous (IV) catheter inserted into a vein. To place the catheter, the doctor or nurse will use a small needle, which can cause a little pain. To reduce the pain, the nurse may first use a cream or other method to numb your child's skin. A tube connects the catheter to the bag containing the blood, which is placed higher than your child's body. The blood then flows slowly into your child's vein. A doctor or nurse will check your child several times during the transfusion to watch for a reaction or other problems.   What are the possible risks? Blood transfusions have many benefits and are often life-saving. But they also have a few risks. Possible risks include:  · Your body's reaction to receiving new blood. This may include:  ? Fever. ? Allergic reactions. ? Breathing problems. · An infection from the blood. This risk is small because of the strict rules placed on handling and storing blood. Getting a viral infection, such as HIV or hepatitis B or C, through blood transfusions has become very rare. The U.S. Food and Drug Administration (FDA) enforces strict guidelines on the collection, testing, storage, and use of blood. · Getting the wrong blood type by accident. Severe reactions, which can be life-threatening, are very rare. How can you care for your child at home? To prevent infection at the transfusion site  · Wash the area daily with warm, soapy water, and pat it dry. Don't use hydrogen peroxide or alcohol, which can slow healing. You may cover the area with a gauze bandage if it weeps or rubs against clothing. Change the bandage every day. · Keep the area clean and dry. When should you call for help? Call 911 anytime you think your child may need emergency care. For example, call if:  · Your child has severe trouble breathing. Call your doctor now or seek immediate medical care if:  · Your child has a fever. · Your child seems weaker or more tired than usual.  · Your child has a yellow tint to his or her skin or the whites of the eyes. Watch closely for changes in your child's health, and be sure to contact your doctor if your child has any problems. Where can you learn more? Go to http://www.gray.com/  Enter C381 in the search box to learn more about \"Learning About Blood Transfusions in Children. \"  Current as of: November 8, 2019               Content Version: 12.6  © 4898-7531 [employer].    Care instructions adapted under license by [employer] (which disclaims liability or warranty for this information). If you have questions about a medical condition or this instruction, always ask your healthcare professional. [place] [address] any warranty or liability for your use of this information. ACTIVITY: as tolerated       DISCHARGE MEDICATIONS:   See Medication Reconciliation Form    · It is important that you take the medication exactly as they are prescribed. · Keep your medication in the bottles provided by the pharmacist and keep a list of the medication names, dosages, and times to be taken in your wallet. · Do not take other medications without consulting your doctor. NOTIFY YOUR PHYSICIAN FOR ANY OF THE FOLLOWING:   Fever over 101 degrees for 24 hours. Chest pain, shortness of breath, fever, chills, nausea, vomiting, diarrhea, change in mentation, falling, weakness, bleeding. Severe pain or pain not relieved by medications. Or, any other signs or symptoms that you may have questions about.       Signed:   Vianney Jasso DO  2/2/2021  9:31 AM

## 2021-02-02 NOTE — PROGRESS NOTES
Bedside and Verbal shift change report given to [address] [place] [address] (oncoming nurse) by Rylee Handley RN (offgoing nurse). Report included the following information SBAR, Kardex, ED Summary, Procedure Summary, Intake/Output, MAR, Recent Results and Cardiac Rhythm Sinus Tach.

## 2021-02-02 NOTE — DISCHARGE SUMMARY
Discharge Summary       PATIENT ID: Tg Castellanos  MRN: [id number]   YOB: 1955    DATE OF ADMISSION: 1/21/2021  3:33 AM    DATE OF DISCHARGE: 2/2/2021  PRIMARY CARE PROVIDER: Kimberli Cole MD     ATTENDING PHYSICIAN: Tiffanie Villela DO   DISCHARGING PROVIDER: Tiffanie Villela DO    To contact this individual call [phone] and ask the  to page. If unavailable ask to be transferred the Adult Hospitalist Department. CONSULTATIONS: IP CONSULT TO GASTROENTEROLOGY  IP CONSULT TO PODIATRY  IP CONSULT TO INFECTIOUS DISEASES  IP CONSULT TO GASTROENTEROLOGY  IP CONSULT TO VASCULAR SURGERY    PROCEDURES/SURGERIES: Procedure(s):  COLONOSCOPY  ENDOSCOPIC POLYPECTOMY  RESOLUTION CLIP    ADMITTING DIAGNOSES & HOSPITAL COURSE:     56-year-old male with PMH HTN, HLD, PAD, s/p right AKA, CAD, s/p CABG, chronic atrial fibrillation, s/p CVA, s/p PEG tube placement, presenting to the [facility] emergency department ED with low H/H. He was then transferred to [facility] for further care. During his hospital stay, patient found to have infected foot ulcer/osteomyelitis. Vascular surgery, ID, podiatry consulted. Recommended AKA and underwent left AKA 1/27/2021 with obtain surgical cure. GI also consulted. Patient underwent EGD and colonoscopy as noted below. Per GI, will likely need transfusions as outpatient due to suspected AVMs and recommend hematology follow-up. Patient stable to discharge to SNF. Discharge was discussed with both patient and his daughter Parag Aranda.            DISCHARGE DIAGNOSES / PLAN:      Infected left foot ulcer stage III/osteomyelitis  -Wound culture Pseudomonas/Diphtheroids  -MRI foot 1/23 suggestive of osteomyelitis  -s/p Vanc/cefepime/Doxy, had surgical cure with AKA  -Appreciate ID/Podiatry/Vascular  -s/p Left AKA 1/27  -Follow up Dr. Oksana Prajapati in 3-4 weeks for wound check and staple removal      GI bleed: suspected secondary to AVMs  Acute blood loss anemia  -continue PPI  -Appreciate GI, diagnostic EGD only 1/21 since the patient is on Plavix:  Esophagus:normal  Stomach:PEG tube seen in body, no other lesions seen  Duodenum/jejunum Two non bleeding AVMs noted in descending duodenum. -GI reconsulted    -Colonoscopy 2/1  Rectum: small internal   Sigmoid: moderate diverticulosis  Descending Colon: moderate diverticulosis  Transverse Colon: normal  Ascending Colon: One 10 mm sessile polyp, removed with hot snare. Polypectomy site empirically closed with one clip. Two sessile polyps (5-8 mm), removed with cold snare  Cecum: normal  Terminal Ileum: normal  -Holding aspirin and plavix     Tachycardia: improved   -multifactorial, volume depletion vs ongoing GI bleed   -suspect low 100s is patient baseline    Hypertension:  -continue carvedilol, hydralazine      Hypokalemia  -repleted     Dyslipidemia  -stable     Peripheral artery disease  -status post right above-knee amputation  -Antiplatelet therapy medication is on hold due to acute GI bleed   -Appreciate vascular surgery     Coronary artery disease, status post CABG/Chronic atrial fibrillation  -Continue home meds  -Antiplatelets on hold    S/p PEG tube placement:  The patient has no difficulties in swallowing.  The PEG tube was placed so that the patient can receive supplemental feeding.  -appreciate nutrition, continue night feeds      Mechanical soft diet, Ensure with meals            ADDITIONAL CARE RECOMMENDATIONS:   Please have staples removed in 3-4 weeks with Dr. James Sanabria     Please stop plavix and aspirin for now. Discuss restarting with Dr. James Sanabria. Please follow new medication list.     Please follow up with primary care provider. Recommend CBC check in 2 weeks. You will need referral to outpatient hematologist for routine transfusions.      DIET:  Osmolite 1.5 @ 90ml/hr x 12hrs (7p to 7a) + 100ml flush before start and after completion  Ensure Enlive with every meal Mechanical soft diet     ACTIVITY: as tolerated         PENDING TEST RESULTS:   At the time of discharge the following test results are still pending: colonoscopy biopsy results    FOLLOW UP APPOINTMENTS:    Follow-up Information     Follow up With Specialties Details Why Armando Lyle MD Internal Medicine   [address] [facility]      Paras Simpson MD Vascular Surgery  Please schedule follow up for wound check [facility]  [phone]             DISCHARGE MEDICATIONS:  Current Discharge Medication List      START taking these medications    Details   omeprazole (PRILOSEC) 40 mg capsule Take 1 Cap by mouth daily. Qty: 60 Cap, Refills: 0         CONTINUE these medications which have CHANGED    Details   oxyCODONE-acetaminophen (Percocet) 5-325 mg per tablet Take 1 Tab by mouth every four (4) hours as needed for Pain for up to 3 days. Max Daily Amount: 6 Tabs. Qty: 12 Tab, Refills: 0    Associated Diagnoses: S/P AKA (above knee amputation), left (HCC)         CONTINUE these medications which have NOT CHANGED    Details   polyethylene glycol (Miralax) 17 gram packet Take 17 g by mouth every fourty-eight (48) hours. senna (Senna) 8.6 mg tablet Take 1 Tab by mouth daily. loratadine (CLARITIN) 10 mg tablet Take 1 Tab by mouth daily. Qty: 90 Tab, Refills: 3    Associated Diagnoses: Seasonal allergic rhinitis due to fungal spores      hydrALAZINE (APRESOLINE) 25 mg tablet Take 1 Tab by mouth three (3) times daily. Qty: 270 Tab, Refills: 1    Associated Diagnoses: Benign essential HTN      atorvastatin (LIPITOR) 40 mg tablet Take 1 Tab by mouth daily. Qty: 90 Tab, Refills: 1    Associated Diagnoses: Mixed hyperlipidemia      lisinopriL (PRINIVIL, ZESTRIL) 5 mg tablet Take 1 Tab by mouth daily.   Qty: 90 Tab, Refills: 1    Associated Diagnoses: Benign essential HTN      carvediloL (COREG) 6.25 mg tablet Take 1 Tab by mouth two (2) times daily (with meals).  Qty: 180 Tab, Refills: 1    Associated Diagnoses: Benign essential HTN      ferrous sulfate 325 mg (65 mg iron) tablet Take 1 Tab by mouth Daily (before breakfast).  Qty: 90 Tab, Refills: 1    Associated Diagnoses: Iron deficiency anemia, unspecified iron deficiency anemia type         STOP taking these medications       therapeutic multivitamin (THERAGRAN) tablet Comments:   Reason for Stopping:         clopidogreL (Plavix) 75 mg tab Comments:   Reason for Stopping:         aspirin delayed-release 81 mg tablet Comments:   Reason for Stopping:         Omeprazole delayed release (PRILOSEC D/R) 20 mg tablet Comments:   Reason for Stopping:                 NOTIFY YOUR PHYSICIAN FOR ANY OF THE FOLLOWING:   Fever over 101 degrees for 24 hours.   Chest pain, shortness of breath, fever, chills, nausea, vomiting, diarrhea, change in mentation, falling, weakness, bleeding. Severe pain or pain not relieved by medications.  Or, any other signs or symptoms that you may have questions about.    DISPOSITION:    Home With:   OT  PT  HH  RN      x Long term SNF/Inpatient Rehab    Independent/assisted living    Hospice    Other:       PATIENT CONDITION AT DISCHARGE:     Functional status    Poor    x Deconditioned     Independent      Cognition   x  Lucid     Forgetful     Dementia      Catheters/lines (plus indication)    Doherty     PICC    x PEG     None      Code status    x Full code     DNR      PHYSICAL EXAMINATION AT DISCHARGE:  Constitutional:  No acute distress   ENT:  Oral mucosa moist, oropharynx benign.    Resp:  CTA bilaterally. No wheezing/rhonchi/rales. No accessory muscle use   CV:  tachycardic, no murmurs, gallops, rubs    GI:  Soft, non distended, non tender. normoactive bowel sounds, no hepatosplenomegaly, PEG tube    Musculoskeletal:  No edema, warm, 2+ pulses throughout, right and left AKA    Neurologic: AAOx3                           Psych:  Good insight, Not  anxious nor agitated. CHRONIC MEDICAL DIAGNOSES:  Problem List as of 2/2/2021 Date Reviewed: 2/1/2021          Codes Class Noted - Resolved    * (Principal) Acute upper GI bleed ICD-10-CM: K92.2  ICD-9-CM: 578.9  1/21/2021 - Present        Anemia ICD-10-CM: D64.9  ICD-9-CM: 285.9  12/7/2020 - Present        COPD (chronic obstructive pulmonary disease) ([facility] ICD-10-CM: J44.9  ICD-9-CM: 803  12/6/2020 - Present        CHF (congestive heart failure) ([facility] ICD-10-CM: I50.9  ICD-9-CM: 428.0  12/6/2020 - Present        CKD (chronic kidney disease) ICD-10-CM: N18.9  ICD-9-CM: 585.9  12/6/2020 - Present        Sepsis ([facility] ICD-10-CM: A41.9  ICD-9-CM: 038.9, 995.91  12/4/2020 - Present    Overview Signed 12/4/2020  9:35 PM by Guerline Leach PA-C     Unknown etiology questionable wound infection however wound does not look much infected. We will do blood culture and wound culture. Will place patient on Zosyn for now             Lactic acid increased ICD-10-CM: E87.2  ICD-9-CM: 276.2  12/4/2020 - Present    Overview Signed 12/4/2020  9:37 PM by Guerline Leach PA-C     From sepsis. Hydration and continue to monitor lactic acid and follow blood culture             Hypotension ICD-10-CM: I95.9  ICD-9-CM: 458.9  12/4/2020 - Present    Overview Signed 12/4/2020  9:38 PM by Guerline Leach PA-C     Likely from sepsis. After IV fluid blood pressure is now up to 017 systolic. Continue with moderate hydration             Wound, open, foot ICD-10-CM: S91.309A  ICD-9-CM: 892.0  12/4/2020 - Present    Overview Addendum 12/4/2020 10:01 PM by Guerline Leach PA-C     Left foot wound does not seem to be infected however will do wound culture and continue with dressing change.   Get x-ray of left foot             Hx of CABG ICD-10-CM: Z95.1  ICD-9-CM: V45.81  12/4/2020 - Present    Overview Signed 12/4/2020  9:39 PM by Guerline Leach PA-C     Continue with medication and follow cardiology CAD (coronary artery disease) ICD-10-CM: I25.10  ICD-9-CM: 414.00  12/4/2020 - Present    Overview Addendum 12/4/2020 10:02 PM by Lucina Faith PA-C     With history of MI so we will continue Plavix and aspirin             Hyperlipidemia, mixed ICD-10-CM: E78.2  ICD-9-CM: 272.2  12/4/2020 - Present    Overview Signed 12/4/2020  9:40 PM by Lucina Faith PA-C     Continue with statin             Depression, major ICD-10-CM: F32.9  ICD-9-CM: 296.20  12/4/2020 - Present    Overview Signed 12/4/2020  9:40 PM by Lucina aFith PA-C     Continue medication             HTN (hypertension), benign ICD-10-CM: I10  ICD-9-CM: 401.1  12/4/2020 - Present    Overview Signed 12/4/2020  9:41 PM by Lucina Faith PA-C     Hold BP meds as BP was low             Tachycardia ICD-10-CM: R00.0  ICD-9-CM: 785.0  12/4/2020 - Present    Overview Signed 12/4/2020  9:43 PM by Lucina Faith PA-C     Likely from sepsis and dehydration.   Will monitor heart rate on telemetry and hydration             Chronic atrial fibrillation [facility]) ICD-10-CM: I48.20  ICD-9-CM: 427.31  12/4/2020 - Present    Overview Signed 12/4/2020  9:44 PM by Lucina Faith PA-C     Now in sinus rhythm and none carvedilol                   Greater than 30 minutes were spent with the patient on counseling and coordination of care    Signed:   Sarmad Crawley DO  2/2/2021  9:44 AM

## 2021-02-02 NOTE — PROGRESS NOTES
Transition of Care Plan to SNF/Rehab    SNF/Rehab Transition:  Patient has been accepted to [facility] and meets criteria for admission. Patient will transported by [facility] expected to leave at 11am.    Communication to Patient/Family:  Met with patient and daugther(Bria: [phone]) and they are agreeable to the transition plan. Communication to SNF/Rehab:  Bedside RN,Hien, has been notified to update the transition plan to the facility and call report ([phone])  Discharge information has been updated on the AVS.     Discharge instructions to be fax'd to facility at ([phone]). Nursing Please include all hard scripts for controlled substances, med rec and dc summary, and AVS in packet.      Reviewed and confirmed with facility,[facility] can manage the patient care needs for the following:     SNF/Rehab Transition:  Patient to follow-up with:  PCP/Specialist:  PCP:Christian Adams MD  Vascular Surgeon: Giacomo Sotelo MD      Reviewed and confirmed with facility, [address] [place] [address], [phone] they can manage the patient care needs for the following:     Dodie Campbell with (X) only those applicable:    Medication:  [x]  Medications will be available at the facility  []  IV Antibiotics   []  Controlled Substance - hard copy to be sent with patient   []  Weekly Labs   Documents:  [x] Hard RX  [x] MAR  [x] Kardex  [] AVS  []Transfer Summary  [x]Discharge   Equipment:  []  CPAP/BiPAP  []  Wound Vacuum  []  Doherty or Urinary Device  []  PICC/Central Line  []  Nebulizer  []  Ventilator   Treatment:  []Isolation (for MRSA, VRE, etc.)  []Surgical Drain Management  []Tracheostomy Care  [x]Dressing Changes  []Dialysis with transportation and chair time   []PEG Care  []Oxygen  []Daily Weights for Heart Failure   Dietary:  []Any diet limitations  []Tube Feedings   []Total Parenteral Management (TPN)   Eligible for Medicaid Long Term Services and Supports  Yes:  [x] Eligible for medical assistance or will become eligible within 180 days and UAI completed. [] Provider/Patient and/or support system has requested screening. [] UAI copy provided to patient or responsible party,   [] UAI unavailable at discharge will send once processed to SNF provider. [] UAI unavailable at discharged mailed to patient  No:   [] Private pay and is not financially eligible for Medicaid within the next 180 days. [] Reside out-of-state. [] A residents of a state owned/operated facility that is licensed  by [address] [facility] or [facility]  [] Enrollment in [facility] hospice services  [] [address] [place] [address] [address]  [] Patient /Family declines to have screening completed or provide financial information for screening     Financial Resources:  Medicaid    [x] Initiated and application pending   [] Full coverage     Advanced Care Plan:  []Surrogate Decision Maker of Care  []POA  [x]Communicated Code Status (Full)    Other     Financial Resources:  Medicaid    [x] Initiated and application pending   [] Full coverage     Advanced Care Plan:  []Surrogate Decision Maker of Care  []POA  []Communicated Code Status ( \"Full\")    Other  Pt UAI submitted and approved on 1/4./2021 by [facility] #[id number]     CM to complete PCS, SBAR, H&P, Kardex, COVID, and facesheet. Packet on hard chart    Nursing to complete Scripts, MAR, AVS, D/C report. Call report to [phone] and will be transferred to Nursing station.     CM: [address] [place],   [phone]

## 2021-02-02 NOTE — PROGRESS NOTES
2/2/2021 0430: Pt refused to have blood drawn. Pt was informed that the lab was a CBC and is used to determine if the pt is losing blood or needs to receive blood. The pt verbalized that he didn't like when his blood continued to be drawn and that no one explained it to him. I educated him on the CBC again and he said that he was not losing blood.

## 2021-02-02 NOTE — PROGRESS NOTES
Problem: Falls - Risk of  Goal: *Absence of Falls  Description: Document Joan Starch Fall Risk and appropriate interventions in the flowsheet. Outcome: Progressing Towards Goal  Note: Fall Risk Interventions:  Mobility Interventions: Communicate number of staff needed for ambulation/transfer         Medication Interventions: Evaluate medications/consider consulting pharmacy    Elimination Interventions: Call light in reach, Urinal in reach              Problem: Patient Education: Go to Patient Education Activity  Goal: Patient/Family Education  Outcome: Progressing Towards Goal     Problem: Pressure Injury - Risk of  Goal: *Prevention of pressure injury  Description: Document Jhonny Scale and appropriate interventions in the flowsheet.   Outcome: Progressing Towards Goal  Note: Pressure Injury Interventions:  Sensory Interventions: Assess changes in LOC, Keep linens dry and wrinkle-free, Maintain/enhance activity level, Minimize linen layers    Moisture Interventions: Absorbent underpads, Apply protective barrier, creams and emollients, Minimize layers, Moisture barrier    Activity Interventions: Assess need for specialty bed, Increase time out of bed, PT/OT evaluation    Mobility Interventions: HOB 30 degrees or less, Pressure redistribution bed/mattress (bed type)    Nutrition Interventions: Document food/fluid/supplement intake    Friction and Shear Interventions: Apply protective barrier, creams and emollients, HOB 30 degrees or less                Problem: Patient Education: Go to Patient Education Activity  Goal: Patient/Family Education  Outcome: Progressing Towards Goal     Problem: Nutrition Deficit  Goal: *Optimize nutritional status  Outcome: Progressing Towards Goal     Problem: Discharge Planning  Goal: *Discharge to safe environment  Outcome: Progressing Towards Goal  Goal: *Knowledge of medication management  Outcome: Progressing Towards Goal  Goal: *Knowledge of discharge instructions  Outcome: Progressing Towards Goal     Problem: Patient Education: Go to Patient Education Activity  Goal: Patient/Family Education  Outcome: Progressing Towards Goal     Problem: Patient Education: Go to Patient Education Activity  Goal: Patient/Family Education  Outcome: Progressing Towards Goal     Problem: Patient Education: Go to Patient Education Activity  Goal: Patient/Family Education  Outcome: Progressing Towards Goal

## 2021-04-12 NOTE — ASSESSMENT & PLAN NOTE
-Continue on home medication  -Stable Over the last 2 weeks, how often have you been bothered by the following problems?          PHQ2 Score: 0  PHQ2 Score Interpretation: No further screening needed  1. Little interest or pleasure in activity?: 0  2. Feeling down, depressed, or hopeless?: 0

## 2021-04-21 ENCOUNTER — TRANSCRIBE ORDER (OUTPATIENT)
Dept: SCHEDULING | Age: 66
End: 2021-04-21

## 2021-04-21 DIAGNOSIS — Z93.1 GASTROSTOMY STATUS (HCC): Primary | ICD-10-CM

## 2022-10-05 ENCOUNTER — HOSPITAL ENCOUNTER (EMERGENCY)
Age: 67
Discharge: HOME | End: 2022-10-05
Attending: INTERNAL MEDICINE
Payer: MEDICARE

## 2022-10-05 VITALS
OXYGEN SATURATION: 99 % | HEART RATE: 88 BPM | WEIGHT: 95 LBS | HEIGHT: 60 IN | RESPIRATION RATE: 16 BRPM | DIASTOLIC BLOOD PRESSURE: 81 MMHG | TEMPERATURE: 98.1 F | BODY MASS INDEX: 18.65 KG/M2 | SYSTOLIC BLOOD PRESSURE: 137 MMHG

## 2022-10-05 DIAGNOSIS — J39.2 THROAT IRRITATION: Primary | ICD-10-CM

## 2022-10-05 PROCEDURE — 99283 EMERGENCY DEPT VISIT LOW MDM: CPT

## 2022-10-05 NOTE — ED NOTES
Patients daughter here to pick patient up and take him back to [facility]. I called [facility] to advise and was transferred to 4 different extensions. None of which answered the phone. Patient left in [facility] with daughter and grand daughter.

## 2022-10-05 NOTE — LETTER
[facility] EMERGENCY DEPT  [address]  [id number] [phone]    Work/School Note    Date: 10/5/2022    To Whom It May concern:      Rosa Palomino was seen and treated today in the emergency room by the following provider(s):  Attending Provider: Lc Cain MD.      Seb Mckinley (poa) is excused from work/school on 10/05/22.         Sincerely,          Ifrah Franklin

## 2022-10-05 NOTE — ED PROVIDER NOTES
EMERGENCY DEPARTMENT HISTORY AND PHYSICAL EXAM      Date: 10/5/2022  Patient Name: Christ Owens    History of Presenting Illness     Chief Complaint   Patient presents with    Sore Throat       History Provided By: Patient    HPI: Christ Owens, 69 y.o. male with h/o HTN; HLD; PVD s/p bilat AKA; MI; Afib; GERD that presents with throat irritation when he swallows for the past 3 weeks. Pt states that he has to clear his throat a lot when eating. Has no difficulty swallowing or speaking. States that when he was in his 25s; he had a PTA and wants to make sure that he is not getting another one. No fever; chills. There are no other complaints, changes, or physical findings at this time. PCP: Mahnaz Flower MD    No current facility-administered medications on file prior to encounter. Current Outpatient Medications on File Prior to Encounter   Medication Sig Dispense Refill    omeprazole (PRILOSEC) 40 mg capsule Take 1 Cap by mouth daily. 60 Cap 0    polyethylene glycol (Miralax) 17 gram packet Take 17 g by mouth every fourty-eight (48) hours. senna (Senna) 8.6 mg tablet Take 1 Tab by mouth daily. loratadine (CLARITIN) 10 mg tablet Take 1 Tab by mouth daily. 90 Tab 3    hydrALAZINE (APRESOLINE) 25 mg tablet Take 1 Tab by mouth three (3) times daily. 270 Tab 1    atorvastatin (LIPITOR) 40 mg tablet Take 1 Tab by mouth daily. 90 Tab 1    lisinopriL (PRINIVIL, ZESTRIL) 5 mg tablet Take 1 Tab by mouth daily. 90 Tab 1    carvediloL (COREG) 6.25 mg tablet Take 1 Tab by mouth two (2) times daily (with meals). 180 Tab 1    ferrous sulfate 325 mg (65 mg iron) tablet Take 1 Tab by mouth Daily (before breakfast).  80 Tab 1       Past History     Past Medical History:  Past Medical History:   Diagnosis Date    Atrial fibrillation ([facility]     Hypertension     MI (myocardial infarction) ([facility] [facility]     PVD (peripheral vascular disease) ([facility] [address] [facility]        Past Surgical History:  Past Surgical History:   Procedure Laterality Date    COLONOSCOPY Left 2/1/2021    COLONOSCOPY performed by Christa Knight MD at [facility] ENDOSCOPY    HX ABOVE KNEE AMPUTATION Right     HX ABOVE KNEE AMPUTATION Right     HX HEART CATHETERIZATION         Family History:  Family History   Problem Relation Age of Onset    Cancer Mother         Ovarian, Cervical    Stroke Father     Cancer Brother        Social History:  Social History     Tobacco Use    Smoking status: Former    Smokeless tobacco: Never   Vaping Use    Vaping Use: Never used   Substance Use Topics    Alcohol use: Not Currently    Drug use: Never       Allergies:  No Known Allergies    Review of Systems   Review of Systems   Constitutional:  Negative for chills and fever. HENT:  Negative for drooling, rhinorrhea, sore throat, trouble swallowing and voice change. Respiratory:  Negative for cough and shortness of breath. Cardiovascular:  Negative for chest pain. Gastrointestinal:  Negative for abdominal pain, diarrhea and nausea. Musculoskeletal:  Negative for arthralgias. Neurological:  Negative for headaches. Psychiatric/Behavioral:  Negative for confusion. Physical Exam   Physical Exam  Vitals and nursing note reviewed. Constitutional:       Comments: Bedridden; bilat AKAs   HENT:      Mouth/Throat:      Mouth: Mucous membranes are moist. No oral lesions. Pharynx: No pharyngeal swelling, oropharyngeal exudate, posterior oropharyngeal erythema or uvula swelling. Tonsils: No tonsillar exudate or tonsillar abscesses. Eyes:      Conjunctiva/sclera: Conjunctivae normal.   Cardiovascular:      Rate and Rhythm: Regular rhythm. Heart sounds: Normal heart sounds. No murmur heard. Pulmonary:      Effort: Pulmonary effort is normal. No respiratory distress. Breath sounds: Normal breath sounds. No wheezing. Abdominal:      Palpations: Abdomen is soft. Musculoskeletal:      Cervical back: Neck supple. Skin:     General: Skin is warm.    Neurological: Mental Status: He is alert and oriented to person, place, and time. Lab and Diagnostic Study Results   Labs -   No results found for this or any previous visit (from the past 12 hour(s)). Radiologic Studies -   @lastxrresult@  CT Results  (Last 48 hours)      None          CXR Results  (Last 48 hours)      None            Medical Decision Making and ED Course   Differential Diagnosis & Medical Decision Making Provider Note:   DDX: THROAT PAIN: Including but not limited to the following:  Strep throat, viral pharyngitis, oral ulcers, URI, mononucleosis, retropharyngeal abscess, PTA, dental infection, Uvulitis, esophageal abrasion, foreign body, GERD, less likely anaphylaxis reaction.     - I am the first provider for this patient. I reviewed the vital signs, available nursing notes, past medical history, past surgical history, family history and social history. The patients presenting problems have been discussed, and they are in agreement with the care plan formulated and outlined with them. I have encouraged them to ask questions as they arise throughout their visit. Vital Signs-Reviewed the patient's vital signs. Patient Vitals for the past 12 hrs:   Temp Pulse Resp BP SpO2   10/05/22 1148 98.1 °F (36.7 °C) 88 16 137/81 99 %       ED Course: In no distress; chronic problems; wanted EMS to stop at the store so that he could get some chips. Will refer to ENt  Procedures       Disposition   Disposition: Discharge    DISCHARGE PLAN:  1. Current Discharge Medication List        CONTINUE these medications which have NOT CHANGED    Details   omeprazole (PRILOSEC) 40 mg capsule Take 1 Cap by mouth daily. Qty: 60 Cap, Refills: 0      polyethylene glycol (Miralax) 17 gram packet Take 17 g by mouth every fourty-eight (48) hours. senna (Senna) 8.6 mg tablet Take 1 Tab by mouth daily. loratadine (CLARITIN) 10 mg tablet Take 1 Tab by mouth daily.   Qty: 90 Tab, Refills: 3    Associated Diagnoses: Seasonal allergic rhinitis due to fungal spores      hydrALAZINE (APRESOLINE) 25 mg tablet Take 1 Tab by mouth three (3) times daily. Qty: 270 Tab, Refills: 1    Associated Diagnoses: Benign essential HTN      atorvastatin (LIPITOR) 40 mg tablet Take 1 Tab by mouth daily. Qty: 90 Tab, Refills: 1    Associated Diagnoses: Mixed hyperlipidemia      lisinopriL (PRINIVIL, ZESTRIL) 5 mg tablet Take 1 Tab by mouth daily. Qty: 90 Tab, Refills: 1    Associated Diagnoses: Benign essential HTN      carvediloL (COREG) 6.25 mg tablet Take 1 Tab by mouth two (2) times daily (with meals). Qty: 180 Tab, Refills: 1    Associated Diagnoses: Benign essential HTN      ferrous sulfate 325 mg (65 mg iron) tablet Take 1 Tab by mouth Daily (before breakfast). Qty: 90 Tab, Refills: 1    Associated Diagnoses: Iron deficiency anemia, unspecified iron deficiency anemia type           2. Follow-up Information       Follow up With Specialties Details Why Contact Info    Yeimi Land DO Otolaryngology Schedule an appointment as soon as possible for a visit in 1 week  [address]            3. Return to ED if worse   4. Current Discharge Medication List            Diagnosis/Clinical Impression     Clinical Impression:   1. Throat irritation        Attestations: Tahmina Lema MD, am the primary clinician of record. Please note that this dictation was completed with [employer], the [employer] voice recognition software. Quite often unanticipated grammatical, syntax, homophones, and other interpretive errors are inadvertently transcribed by the computer software. Please disregard these errors. Please excuse any errors that have escaped final proofreading. Thank you.

## 2022-10-05 NOTE — ED TRIAGE NOTES
\"My throat has been irritated for 3 weeks and I want to know why\". Refused medication at nursing home.

## 2022-10-05 NOTE — ED NOTES
Patient states that he is tired of being here and wants to know if someone can take him to get [facility] money\". Advised that [facility] unfortunately doesn't have anyone to take him to the [place]. Encouraged to let his daughter. States understanding.

## 2022-10-17 ENCOUNTER — TRANSCRIBE ORDER (OUTPATIENT)
Dept: SCHEDULING | Age: 67
End: 2022-10-17

## 2022-10-17 DIAGNOSIS — J38.3 OTHER DISEASES OF VOCAL CORDS: Primary | ICD-10-CM

## 2022-10-20 ENCOUNTER — TRANSCRIBE ORDER (OUTPATIENT)
Dept: REGISTRATION | Age: 67
End: 2022-10-20

## 2022-10-20 DIAGNOSIS — J38.3 OTHER DISEASES OF VOCAL CORDS: Primary | ICD-10-CM

## 2022-10-21 ENCOUNTER — HOSPITAL ENCOUNTER (OUTPATIENT)
Dept: CT IMAGING | Age: 67
Discharge: HOME | End: 2022-10-21
Payer: MEDICARE

## 2022-10-21 ENCOUNTER — HOSPITAL ENCOUNTER (OUTPATIENT)
Dept: LAB | Age: 67
End: 2022-10-21
Payer: MEDICARE

## 2022-10-21 DIAGNOSIS — J38.3 OTHER DISEASES OF VOCAL CORDS: ICD-10-CM

## 2022-10-21 LAB — CREAT SERPL-MCNC: 0.81 MG/DL (ref 0.6–1.3)

## 2022-10-21 PROCEDURE — 82565 ASSAY OF CREATININE: CPT

## 2022-10-21 PROCEDURE — 74011000636 HC RX REV CODE- 636

## 2022-10-21 PROCEDURE — 36415 COLL VENOUS BLD VENIPUNCTURE: CPT

## 2022-10-21 PROCEDURE — 70492 CT SFT TSUE NCK W/O & W/DYE: CPT

## 2022-10-21 RX ADMIN — IOPAMIDOL 100 ML: 755 INJECTION, SOLUTION INTRAVENOUS at 11:45

## 2022-11-29 NOTE — PERIOP NOTES
Initial RN admission and assessment performed and documented in Endoscopy navigator. Patient evaluated by anesthesia in pre-procedure holding. All procedural vital signs, airway assessment, and level of consciousness information monitored and recorded by anesthesia staff on the anesthesia record. Report received from CRNA post procedure. Patient transported to recovery area by RN. Endoscope was pre-cleaned at bedside immediately following procedure by MALISSA Durham. What Type Of Note Output Would You Prefer (Optional)?: Standard Output How Severe Is Your Skin Lesion?: mild Has Your Skin Lesion Been Treated?: been treated Is This A New Presentation, Or A Follow-Up?: Skin Lesion

## 2023-01-01 ENCOUNTER — APPOINTMENT (OUTPATIENT)
Dept: GENERAL RADIOLOGY | Age: 68
End: 2023-01-01
Attending: INTERNAL MEDICINE
Payer: MEDICARE

## 2023-01-01 ENCOUNTER — HOSPITAL ENCOUNTER (EMERGENCY)
Age: 68
End: 2023-01-23
Attending: INTERNAL MEDICINE
Payer: MEDICARE

## 2023-01-01 VITALS
RESPIRATION RATE: 35 BRPM | SYSTOLIC BLOOD PRESSURE: 138 MMHG | DIASTOLIC BLOOD PRESSURE: 85 MMHG | HEART RATE: 137 BPM | OXYGEN SATURATION: 70 %

## 2023-01-01 DIAGNOSIS — I46.9 CARDIAC ARREST (HCC): Primary | ICD-10-CM

## 2023-01-01 LAB
ATRIAL RATE: 152 BPM
CALCULATED P AXIS, ECG09: 84 DEGREES
CALCULATED R AXIS, ECG10: 73 DEGREES
CALCULATED T AXIS, ECG11: -58 DEGREES
DIAGNOSIS, 93000: NORMAL
P-R INTERVAL, ECG05: 106 MS
Q-T INTERVAL, ECG07: 286 MS
QRS DURATION, ECG06: 98 MS
QTC CALCULATION (BEZET), ECG08: 457 MS
VENTRICULAR RATE, ECG03: 153 BPM

## 2023-01-01 PROCEDURE — 74011250636 HC RX REV CODE- 250/636: Performed by: INTERNAL MEDICINE

## 2023-01-01 PROCEDURE — 96375 TX/PRO/DX INJ NEW DRUG ADDON: CPT

## 2023-01-01 PROCEDURE — 93005 ELECTROCARDIOGRAM TRACING: CPT

## 2023-01-01 PROCEDURE — 96374 THER/PROPH/DIAG INJ IV PUSH: CPT

## 2023-01-01 PROCEDURE — 99285 EMERGENCY DEPT VISIT HI MDM: CPT

## 2023-01-01 PROCEDURE — 31500 INSERT EMERGENCY AIRWAY: CPT

## 2023-01-01 PROCEDURE — 96361 HYDRATE IV INFUSION ADD-ON: CPT

## 2023-01-01 PROCEDURE — 75810000455 HC PLCMT CENT VENOUS CATH LVL 2 5182

## 2023-01-01 PROCEDURE — 74011000250 HC RX REV CODE- 250: Performed by: INTERNAL MEDICINE

## 2023-01-01 RX ORDER — ETOMIDATE 2 MG/ML
20 INJECTION INTRAVENOUS ONCE
Status: DISCONTINUED | OUTPATIENT
Start: 2023-01-01 | End: 2023-01-01 | Stop reason: HOSPADM

## 2023-01-01 RX ORDER — CALCIUM CHLORIDE INJECTION 100 MG/ML
INJECTION, SOLUTION INTRAVENOUS
Status: COMPLETED | OUTPATIENT
Start: 2023-01-01 | End: 2023-01-01

## 2023-01-01 RX ORDER — EPINEPHRINE 0.1 MG/ML
INJECTION INTRACARDIAC; INTRAVENOUS
Status: COMPLETED | OUTPATIENT
Start: 2023-01-01 | End: 2023-01-01

## 2023-01-01 RX ORDER — SODIUM CHLORIDE 9 MG/ML
INJECTION, SOLUTION INTRAVENOUS
Status: COMPLETED | OUTPATIENT
Start: 2023-01-01 | End: 2023-01-01

## 2023-01-01 RX ORDER — SODIUM BICARBONATE 1 MEQ/ML
SYRINGE (ML) INTRAVENOUS
Status: COMPLETED | OUTPATIENT
Start: 2023-01-01 | End: 2023-01-01

## 2023-01-01 RX ADMIN — SODIUM BICARBONATE 50 MEQ: 84 INJECTION INTRAVENOUS at 12:20

## 2023-01-01 RX ADMIN — CALCIUM CHLORIDE 14 MEQ: 100 INJECTION, SOLUTION INTRAVENOUS at 12:24

## 2023-01-01 RX ADMIN — EPINEPHRINE 1 MG: 0.1 INJECTION, SOLUTION INTRAVENOUS at 12:00

## 2023-01-01 RX ADMIN — SODIUM CHLORIDE 500 ML: 9 INJECTION, SOLUTION INTRAVENOUS at 12:02

## 2023-01-01 RX ADMIN — EPINEPHRINE 1 MG: 0.1 INJECTION, SOLUTION INTRAVENOUS at 12:18

## 2023-01-23 NOTE — PROGRESS NOTES
Patient arrived by EMS on NRB, transitioned to bed and placed on 15 LPM on NRB. Sats were difficult to obtain, poor pleth. Changed to different fingers multiple times. Decision made by MD to intubate with #7.0 Lot number [id number]. Positive color change per ETCO2 with equal bilateral BS. CPR initiated x 2. ABG attempted x 2. Family at the bedside post CPR. Family member requested not to perform CPR the third time and asked to allow family member to pass.       01/23/23 1150   Patient Observations   Pulse (Heart Rate) 79   Resp Rate (!) 39   Airway - Endotracheal Tube 01/23/23   Placement Date: 01/23/23     Insertion Depth (cm) 25 cm   Line Guillermo Lips   Side Secured Centered   Cuff Pressure   (mlt)   Ventilator Initiate/Discontinue   Ventilator Initiate Yes   Vent Settings   FIO2 (%) 100 %   CMV Rate Set 14   Back-Up Rate 14   Vt Set (ml) 420 ml   PEEP/VENT (cm H2O) 8 cm H20   Insp Time (sec) 0.9 sec   Flow Trigger 3   Ventilator Measurements   Resp Rate Observed 39   Vt Spont (ml) 391 ml   Ve Observed (l/min) 14 l/min   PIP Observed (cm H2O) 39 cm H2O   MAP (cm H2O) 24   I:E Ratio Actual 1:2   Auto PEEP Observed (cm H2O) 7.4 cm H2O   Safety & Alarms   Backup Mode Checked/Apnea Yes   Pressure Max 50 cm H2O   Pressure Min 14 cm H2O   Ve Min 2   Ve Max 20   Vt Min 200 ml   Vt Max 900 ml   RR Max 45   Ambu Bag Yes   Ambu Mask Yes   Airway Procedures   $$ Airway Procedures Intubation   Vent Method/Mode   Ventilation Method Conventional   Ventilator Mode Assist control;VC+   $$ Ventilator Initial Initial Vent Day

## 2023-01-23 NOTE — ED TRIAGE NOTES
Patient arrives via EMS from [facility]. States patient was \"barely breathing\" on their arrival.  Found patient on NRBM. Patient noted to have SVT per EMS. States starting IO and dosing Lidocaine to I/O site. Advises that 12 mg Adenosine administered. Patient arrived to ER with NRBM in place.   EMS states  pta to ER

## 2023-01-23 NOTE — ED NOTES
Resuscitation concluded with loss of pulse as physician called time of death at . Patient's daughter requested no further intervention.

## 2023-01-23 NOTE — ED NOTES
Family member, daughter Mazin Parker in the room, stating that if the pt's heart was to stop again to [place] him go\". Family requesting pt be at DNR from this point on.

## 2023-01-23 NOTE — ED NOTES
Patient's daughter, Nakul Mcdermott, states that patient has been having chest congestion all week and had requested that [facility] \"suction\" him. She voiced concerns that he may be aspirating.

## 2023-01-23 NOTE — LETTER
2023 3:15 PM    Mr. Hussein Whitaker  [facility] [id number]      To Whom It May Concern:    Hussein Whitaker  in the Emergency Department on 2023.  His son, Evan Burrell, was with the family at the Emergency Department    Sincerely,      Lennox Sou

## 2023-01-23 NOTE — PROGRESS NOTES
responded to Death of  Daniele Cornejo, who was a 69 y.o.,male,     The  provided the following Interventions:  Provided crisis pastoral care, pastoral support and grief interventions. Offered prayers on behalf of the patient. Chart reviewed. Plan:  Chaplains will continue to follow and will provide pastoral care on an as needed/requested basis and grief support for the family.     [address] [facility]   Staff [address] [facility]   [phone]

## 2023-01-24 NOTE — ED PROVIDER NOTES
[facility] EMERGENCY DEPT  EMERGENCY DEPARTMENT HISTORY AND PHYSICAL EXAM      Date: 1/23/2023  Patient Name: Alix Tom  MRN: [id number]  [place]: 1955  Date of evaluation: 1/23/2023  Provider: Lai Sommers MD   Note Started: 7:37 PM 1/23/23    HISTORY OF PRESENT ILLNESS     Chief Complaint   Patient presents with    Altered mental status    Respiratory Distress    SVT       History Provided By: EMS    HPI: Alix Tom, 69 y.o. male with a history of atrial fibrillation, CKD, COPD, left and right above-the-knee amputation, hypertension, MI, peripheral vascular disease that was brought in by EMS from [address] [place] [address] for respiratory failure. 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 Patient's daughter Marcus Glez states that he has been having chest congestion for the past week. Today he was found to be lethargic and having agonal respirations and EMS was called. EMS put him on a nonrebreather mask at 6 L and noted that he had an SVT and he was given 2 doses of adenosine and brought to the emergency room with a right humeral IO in place. On arrival to the emergency room, patient was noted to be with agonal respirations and bag-valve-mask was began and the patient was intubated. Patient was noted to be in a tachycardia that would intermittently change to normal sinus rhythm. It was noted that he lost his pulse and CPR began. Bedside ultrasound showed cardiac activity but Doppler pulses did not reveal a pulse. Patient did not have a cardiac tamponade. He was given IV fluid boluses which was difficult to do because his IVs kept blowing in the right humeral IO also blue. We started preparations for central line. He developed wide-complex tachycardia and was given CPR medications as well as continued chest compressions with 2-minute pulse checks.   The patient daughter Marcus Glez was in the room. We were finally able to obtain a pulse and a right internal jugular central line was placed. Patient continued to deteriorate with a pulseless bradycardia with bedside ultrasound showing little cardiac activity. Patient's daughter stated that she did not want any further interventions and the code was called at . PAST MEDICAL HISTORY   Past Medical History:  Past Medical History:   Diagnosis Date    Anemia     Arteriovenous malformation of digestive system vessel     Atrial fibrillation (HCC)     CKD (chronic kidney disease)     COPD (chronic obstructive pulmonary disease) (HCC)     Diverticulosis     GERD (gastroesophageal reflux disease)     Heart failure (HCC)     Hx of AKA (above knee amputation), left (HCC)     Hx of AKA (above knee amputation), right (HCC)     Hypertension     MI (myocardial infarction) ([facility]     PVD (peripheral vascular disease) ([facility] [address]        Past Surgical History:  Past Surgical History:   Procedure Laterality Date    COLONOSCOPY Left 2/1/2021    COLONOSCOPY performed by Alejandro Gallagher MD at [address] [place] [address] ENDOSCOPY    HX ABOVE KNEE AMPUTATION Right     HX ABOVE KNEE AMPUTATION Right     HX HEART CATHETERIZATION         Family History:  Family History   Problem Relation Age of Onset    Cancer Mother         Ovarian, Cervical    Stroke Father     Cancer Brother        Social History:  Social History     Tobacco Use    Smoking status: Former    Smokeless tobacco: Never   Vaping Use    Vaping Use: Never used   Substance Use Topics    Alcohol use: Not Currently    Drug use: Never       Allergies:  No Known Allergies    PCP: Shola Massey MD    Current Meds:   Discharge Medication List as of 1/23/2023  3:10 PM        CONTINUE these medications which have NOT CHANGED    Details   omeprazole (PRILOSEC) 40 mg capsule Take 1 Cap by mouth daily. , Print, Disp-60 Cap, R-0      polyethylene glycol (Miralax) 17 gram packet Take 17 g by mouth every fourty-eight (48) hours. , Historical Med      senna (Senna) 8.6 mg tablet Take 1 Tab by mouth daily. , Historical Med      loratadine (CLARITIN) 10 mg tablet Take 1 Tab by mouth daily. , Normal, Disp-90 Tab,R-3      hydrALAZINE (APRESOLINE) 25 mg tablet Take 1 Tab by mouth three (3) times daily. , Normal, Disp-270 Tab,R-1      atorvastatin (LIPITOR) 40 mg tablet Take 1 Tab by mouth daily. , Normal, Disp-90 Tab,R-1      lisinopriL (PRINIVIL, ZESTRIL) 5 mg tablet Take 1 Tab by mouth daily. , Normal, Disp-90 Tab,R-1      carvediloL (COREG) 6.25 mg tablet Take 1 Tab by mouth two (2) times daily (with meals). , Normal, Disp-180 Tab,R-1      ferrous sulfate 325 mg (65 mg iron) tablet Take 1 Tab by mouth Daily (before breakfast). , Normal, Disp-90 Tab,R-1             REVIEW OF SYSTEMS   Review of Systems   Unable to perform ROS: Acuity of condition   Positives and Pertinent negatives as per HPI. PHYSICAL EXAM     ED Triage Vitals [01/23/23 1134]   ED Encounter Vitals Group      BP (!) 86/48      Pulse (Heart Rate) (!) 155      Resp Rate (!) 47      Temp       Temp src       O2 Sat (%) (!) 70 %      Weight       Height       Physical Exam  Constitutional:       Comments:  Thin chronically ill male; agonal respirations; not responding to pain; bilat AKAs   Cardiovascular:      Comments: No palpable pulse during code  Pulmonary:      Comments: No spontaneous respirations      SCREENINGS               No data recorded        LAB, EKG AND DIAGNOSTIC RESULTS   Labs:  Recent Results (from the past 12 hour(s))   EKG, 12 LEAD, INITIAL    Collection Time: 01/23/23 11:32 AM   Result Value Ref Range    Ventricular Rate 153 BPM    Atrial Rate 152 BPM    P-R Interval 106 ms    QRS Duration 98 ms    Q-T Interval 286 ms    QTC Calculation (Bezet) 457 ms    Calculated P Axis 84 degrees    Calculated R Axis 73 degrees    Calculated T Axis -58 degrees    Diagnosis       Supraventricular tachycardia  Anteroseptal infarct, age indeterminate  Artifact in lead(s) I,II,III,aVR,aVL,aVF,V1,V2,V4,V5,V6         EKG: Initial EKG interpreted by me. [address] a lot of interference. Narrow complex tachycardia is all I am able to read    Radiologic Studies:  Non-plain film images such as CT, Ultrasound and MRI are read by the radiologist. Plain radiographic images are visualized and preliminarily interpreted by the ED Provider with the below findings:    7:57 PM  Pt  with severe PVD presented with respiratory arrest and SVT/ PEA rhythm; no tamponade; cardiac movement mostly of the distal left ventricle that eventually deteriorated with a wide rhytm with very little cardiac activity. Daughter wanted CPR stopped. Interpretation per the Radiologist below, if available at the time of this note:  No results found. PROCEDURES   Unless otherwise noted below, none. Performed by: Eliza Vicente MD   Intubation    Date/Time: 1/23/2023 7:52 PM  Performed by: Evaline Halsted, MD  Authorized by: Evaline Halsted, MD     Consent:     Consent obtained:  Emergent situation    Consent given by:  Cecilia Hutchinson details:     Indication: failure to oxygenate, failure to protect airway, failure to ventilate and predicted clinical deterioration      Patient status:  Unresponsive    Look externally: no concerns      Hyoid-thyroid distance: 2 or more finger widths      Obstruction comment:  Post pharyngeal cancer    Neck mobility: reduced      Pharmacologic strategy: none    Procedure details:     Preoxygenation:  Bag valve mask    CPR in progress: yes      Intubation method:  Oral    Intubation technique: video assisted      Laryngoscope blade:   Mac 4    Bougie used: no      Grade view: II      Tube size (mm):  7.5    Tube type:  Cuffed    Number of attempts:  1    Tube visualized through cords: yes    Placement assessment:     ETT at teeth/gumline (cm):  25    Tube secured with:  ETT lopez    Breath sounds:  Equal and absent over the epigastrium    Placement verification: chest rise, direct visualization and esophageal detector    Post-procedure details:     Complications: cardiac arrest      Procedure Note - Central Line Placement:   7:49 PM  Performed by: Dr Viridiana Smith    Immediately prior to the procedure, the patient was reevaluated and found suitable for the planned procedure and any planned medications. Immediately prior to the procedure a time out was called to verify the correct patient, procedure, equipment, staff, and marking as appropriate. Area was cleansed with Chlorprep and anesthetized with 3mLs of 1% lidocaine. Prepped and draped in sterile fashion. Landmarks identified. 18 gauge needle with triple lumen catheter was inserted into pt's Right, Internal Jugular Vein with ultrasound guidance. Line sutured in place; sterile dressing applied. Position: Trendelenburg  Number of attempts: 1  Estimated blood loss: 5  The procedure took 1-15 minutes, and pt tolerated       CRITICAL CARE TIME   7:55 PM  I have spent 30 minutes of critical care time' excluding other separate billable procedure time;  which involved in lab review, consultations with specialist, family decision-making, and documentation. During this entire length of time I was immediately available to the patient. Critical Care: The reason for providing this level of medical care for this critically ill patient was due a critical illness that impaired one or more vital organ systems such that there was a high probability of imminent or life threatening deterioration in the patients condition. This care involved high complexity decision making to assess, manipulate, and support vital system functions, to treat this degreee vital organ system failure and to prevent further life threatening deterioration of the patients condition.       ED COURSE and DIFFERENTIAL DIAGNOSIS/MDM   Vitals:    Vitals:    01/23/23 1223 01/23/23 1225 01/23/23 1232 01/23/23 1238   BP:   121/75 138/85   Pulse: (!) 113 (!) 150 (!) 169 (!) 137   Resp: 14  (!) 35 (!) 35   SpO2:            Patient was given the following medications:  Medications   EPINEPHrine (ADRENALIN) 0.1 mg/mL syringe (1 mg IntraVENous Given 23 1200)   0.9% sodium chloride infusion ( IntraVENous IV Completed 23 1251)   EPINEPHrine (ADRENALIN) 0.1 mg/mL syringe (1 mg IntraVENous Given 23 1218)   sodium bicarbonate 8.4 % (1 mEq/mL) injection (50 mEq IntraVENous Given 23 1220)   calcium chloride injection (14 mEq IntraVENous Given 23 1224)     FINAL IMPRESSION     1. Cardiac arrest [facility])          DISPOSITION/PLAN            PATIENT REFERRED TO:  Follow-up Information    None           DISCHARGE MEDICATIONS:  Discharge Medication List as of 2023  3:10 PM            DISCONTINUED MEDICATIONS:  Discharge Medication List as of 2023  3:10 PM          I am the Primary Clinician of Record: Stacia Solano MD (electronically signed)    (Please note that parts of this dictation were completed with voice recognition software. Quite often unanticipated grammatical, syntax, homophones, and other interpretive errors are inadvertently transcribed by the computer software. Please disregards these errors.  Please excuse any errors that have escaped final proofreading.)

## 2023-01-25 NOTE — PROGRESS NOTES
1100: Received call from Sharyn at [facility] that family has consented to corneal donation. She faxed consent and it has been scanned to his chart. [id number] Pickard from [facility] in to retrieve corneas. 1250: Corneas removed by Rosamaria Jain from [facility]. 26: Called daughter, Diego Conklin, to let her know patient was ready to be released to  home. She questioned whether he needed an autopsy to check for any aspiration pneumonia. 1440: Called ME's office and spoke to Huyen Barker who advised this is not an ME case and that family would have to seek private autopsy if desired. 1500: Called daughter back to advise and she states she does not want an autopsy. 1515: [id number]. 1545: Patient picked up from the morgue by the  home.

## (undated) DEVICE — SNARE ENDOSCP M L240CM W27MM SHTH DIA2.4MM CHN 2.8MM OVL

## (undated) DEVICE — STRAP,POSITIONING,KNEE/BODY,FOAM,4X60": Brand: MEDLINE

## (undated) DEVICE — BANDAGE COBAN 4 IN COMPR W4INXL5YD FOAM COHESIVE QUIK STK SELF ADH SFT

## (undated) DEVICE — STERILE POLYISOPRENE POWDER-FREE SURGICAL GLOVES WITH EMOLLIENT COATING: Brand: PROTEXIS

## (undated) DEVICE — Device

## (undated) DEVICE — COVER,MAYO STAND,STERILE: Brand: MEDLINE

## (undated) DEVICE — STOCKINETTE,IMPERVIOUS,12X48,STERILE: Brand: MEDLINE

## (undated) DEVICE — SUT SLK 2-0SH 30IN BLK --

## (undated) DEVICE — TRAP SURG QUAD PARABOLA SLOT DSGN SFTY SCRN TRAPEASE

## (undated) DEVICE — SOL IRR SOD CL 0.9% 1000ML BTL --

## (undated) DEVICE — SPONGE GZ W4XL4IN COT 12 PLY TYP VII WVN C FLD DSGN

## (undated) DEVICE — REM POLYHESIVE ADULT PATIENT RETURN ELECTRODE: Brand: VALLEYLAB

## (undated) DEVICE — SUTURE PERMAHAND SZ 0 L30IN NONABSORBABLE BLK SILK BRAID A306H

## (undated) DEVICE — TUBING HYDR IRR --

## (undated) DEVICE — WIRE SAW SURG FOR BONE CUT GIGLI 510 MM LEN UNIV

## (undated) DEVICE — DRESSING PETRO W3XL8IN N ADH OIL EMUL GZ CURAD

## (undated) DEVICE — SHEET, T, LAPAROTOMY, STERILE: Brand: MEDLINE

## (undated) DEVICE — HANDLE LT SNAP ON ULT DURABLE LENS FOR TRUMPF ALC DISPOSABLE

## (undated) DEVICE — BANDAGE,GAUZE,BULKEE II,4.5"X4.1YD,STRL: Brand: MEDLINE

## (undated) DEVICE — BNDG ELAS HK LOOP 4X5YD NS -- MATRIX

## (undated) DEVICE — SUTURE PERMAHAND SZ 2-0 L30IN NONABSORBABLE BLK SILK W/O A305H

## (undated) DEVICE — SURGICAL PROCEDURE PACK BASIN MAJ SET CUST NO CAUT

## (undated) DEVICE — ROCKER SWITCH PENCIL BLADE ELECTRODE, HOLSTER: Brand: EDGE

## (undated) DEVICE — SPONGE LAP 18X18IN STRL -- 5/PK

## (undated) DEVICE — INFECTION CONTROL KIT SYS

## (undated) DEVICE — GOWN,SIRUS,NONRNF,SETINSLV,2XL,18/CS: Brand: MEDLINE

## (undated) DEVICE — GAUZE,SPONGE,FLUFF,6"X6.75",STRL,5/TRAY: Brand: MEDLINE

## (undated) DEVICE — DRAPE,REIN 53X77,STERILE: Brand: MEDLINE

## (undated) DEVICE — GOWN,SIRUS,FABRNF,XL,20/CS: Brand: MEDLINE

## (undated) DEVICE — TOWEL SURG W17XL27IN STD BLU COT NONFENESTRATED PREWASHED

## (undated) DEVICE — PREP SKN CHLRAPRP APL 26ML STR --

## (undated) DEVICE — PAD,ABDOMINAL,5"X9",ST,LF,25/BX: Brand: MEDLINE INDUSTRIES, INC.

## (undated) DEVICE — PENCIL SMK EVAC 10 FT BLADE ELECTRD ROCKER FOR TELSCP